# Patient Record
Sex: FEMALE | Race: OTHER | HISPANIC OR LATINO | Employment: UNEMPLOYED | URBAN - METROPOLITAN AREA
[De-identification: names, ages, dates, MRNs, and addresses within clinical notes are randomized per-mention and may not be internally consistent; named-entity substitution may affect disease eponyms.]

---

## 2017-02-13 ENCOUNTER — OFFICE VISIT (OUTPATIENT)
Dept: PODIATRY | Facility: CLINIC | Age: 47
End: 2017-02-13
Payer: COMMERCIAL

## 2017-02-13 VITALS — HEIGHT: 62 IN | WEIGHT: 110 LBS | BODY MASS INDEX: 20.24 KG/M2

## 2017-02-13 DIAGNOSIS — M79.671 PAIN IN BOTH FEET: Primary | ICD-10-CM

## 2017-02-13 DIAGNOSIS — M79.672 PAIN IN BOTH FEET: Primary | ICD-10-CM

## 2017-02-13 DIAGNOSIS — L84 CALLUS OF FOOT: ICD-10-CM

## 2017-02-13 PROCEDURE — 99999 PR PBB SHADOW E&M-EST. PATIENT-LVL II: CPT | Mod: PBBFAC,,, | Performed by: PODIATRIST

## 2017-02-13 PROCEDURE — 99213 OFFICE O/P EST LOW 20 MIN: CPT | Mod: S$GLB,,, | Performed by: PODIATRIST

## 2017-02-13 RX ORDER — UREA 1 ML/10ML
1 LOTION TOPICAL DAILY
Qty: 180 ML | Refills: 6 | Status: SHIPPED | OUTPATIENT
Start: 2017-02-13 | End: 2018-11-30 | Stop reason: SDUPTHER

## 2017-02-13 NOTE — PROGRESS NOTES
"CC:    Painful callus      HPI:   Deja Theodore is a 46 y.o. female who presents to clinic with complaints of a painful porokeratosis, bilateral foot.   Requesting trimming.  She has been using the urea cream and pumice stone to the affected area with some relief.    She is wearing athletic shoes today.  No new issues.         PMH:  Patient Active Problem List   Diagnosis    Sciatica    Spondylosis with myelopathy, lumbar region    Degeneration of lumbar or lumbosacral intervertebral disc    Spondylolysis of lumbar region    Spondylolisthesis    Myopia of both eyes - Both Eyes    Gallbladder polyp    Headache, menstrual migraine, intractable    Bilateral carpal tunnel syndrome    URI (upper respiratory infection)       Current Outpatient Prescriptions on File Prior to Visit   Medication Sig Dispense Refill    benzonatate (TESSALON) 200 MG capsule Take 1 capsule (200 mg total) by mouth every evening. 30 capsule 0    [DISCONTINUED] urea 10 % Lotn Apply 1 application topically once daily. 180 mL 6    [DISCONTINUED] amoxicillin-clavulanate 875-125mg (AUGMENTIN) 875-125 mg per tablet Take 1 tablet by mouth every 12 (twelve) hours. 14 tablet 0     No current facility-administered medications on file prior to visit.        ALLG:  Review of patient's allergies indicates:   Allergen Reactions    No known allergies          ROS:  General ROS: negative for - chills, fatigue or fever  Cardiovascular ROS: no chest pain or dyspnea on exertion  Musculoskeletal ROS: negative for - joint pain or joint stiffness   Skin: Negative for rash, negative for nail or hair changes. Positive for  Corn/callus on the foot.         EXAM:  Vitals:    02/13/17 1020   Weight: 49.9 kg (110 lb)   Height: 5' 2" (1.575 m)        General: alert and orient x 3, well-developed, well-nourished and in no apparent distress.    Vasc: Palpable pedal pulses, feet appropriately warm to touch. Capillary refill time within normal limits.   Neuro: " Epicritic sensation intact.  No focal deficits. No Tinels.   MSK: No gross osseus deformities. Pain on palpation to hyperkeratotic lesion.   Derm:  Multiple Nucleated porokeratosis, bilateral sub 2nd met head.  Tender to palpation.  No other open lesions, macerations, or rashes noted.   No redness or swelling           Assessment / Plan:    I counseled the patient on her conditions, their implications and medical management.     Pain in both feet 2/2 Callus of foot  -     urea 10 % Lotn; Apply 1 application topically once daily. To dry skin on the feet.  Dispense: 180 mL; Refill: 6  - Cont urea cream and pumice stone.      - Continue Supportive comfortable shoes only.         Call or return to clinic prn if these symptoms worsen or fail to improve as anticipated.

## 2017-02-23 ENCOUNTER — PATIENT MESSAGE (OUTPATIENT)
Dept: OBSTETRICS AND GYNECOLOGY | Facility: CLINIC | Age: 47
End: 2017-02-23

## 2017-02-24 NOTE — TELEPHONE ENCOUNTER
Called pharmacy and they have to order med it would be in Thursday next week, spoke to patient and she states that she will call back in 2 weeks to let us know if she wants this before calling in again.

## 2017-03-07 ENCOUNTER — PATIENT MESSAGE (OUTPATIENT)
Dept: OBSTETRICS AND GYNECOLOGY | Facility: CLINIC | Age: 47
End: 2017-03-07

## 2017-04-13 ENCOUNTER — PATIENT MESSAGE (OUTPATIENT)
Dept: OBSTETRICS AND GYNECOLOGY | Facility: CLINIC | Age: 47
End: 2017-04-13

## 2017-05-01 ENCOUNTER — OFFICE VISIT (OUTPATIENT)
Dept: PODIATRY | Facility: CLINIC | Age: 47
End: 2017-05-01
Payer: COMMERCIAL

## 2017-05-01 VITALS
SYSTOLIC BLOOD PRESSURE: 102 MMHG | HEIGHT: 62 IN | BODY MASS INDEX: 20.24 KG/M2 | DIASTOLIC BLOOD PRESSURE: 69 MMHG | HEART RATE: 81 BPM | WEIGHT: 110 LBS

## 2017-05-01 DIAGNOSIS — M79.671 PAIN IN BOTH FEET: Primary | ICD-10-CM

## 2017-05-01 DIAGNOSIS — L84 CALLUS OF FOOT: ICD-10-CM

## 2017-05-01 DIAGNOSIS — M79.672 PAIN IN BOTH FEET: Primary | ICD-10-CM

## 2017-05-01 PROCEDURE — 99213 OFFICE O/P EST LOW 20 MIN: CPT | Mod: S$GLB,,, | Performed by: PODIATRIST

## 2017-05-01 PROCEDURE — 1160F RVW MEDS BY RX/DR IN RCRD: CPT | Mod: S$GLB,,, | Performed by: PODIATRIST

## 2017-05-01 PROCEDURE — 99999 PR PBB SHADOW E&M-EST. PATIENT-LVL III: CPT | Mod: PBBFAC,,, | Performed by: PODIATRIST

## 2017-05-01 NOTE — PROGRESS NOTES
"CC:    Painful callus      HPI:   Deja Theodore is a 47 y.o. female who presents to clinic with complaints of a painful porokeratosis, L > R foot.   Requesting trimming.  She has been using the urea cream daily and pumice stone to the affected area with some relief.    She is wearing athletic shoes today.  No new issues. She states she never walks barefoot.       PMH:  Patient Active Problem List   Diagnosis    Sciatica    Spondylosis with myelopathy, lumbar region    Degeneration of lumbar or lumbosacral intervertebral disc    Spondylolysis of lumbar region    Spondylolisthesis    Myopia of both eyes - Both Eyes    Gallbladder polyp    Headache, menstrual migraine, intractable    Bilateral carpal tunnel syndrome    URI (upper respiratory infection)         Current Outpatient Prescriptions on File Prior to Visit   Medication Sig Dispense Refill    benzonatate (TESSALON) 200 MG capsule Take 1 capsule (200 mg total) by mouth every evening. 30 capsule 0    flibanserin 100 mg Tab Take 100 mg by mouth once daily. BIN: [id number] PCN: [id number] GROUP: [id number] ID: [id number] 30 tablet 5    norgestrel-ethinyl estradiol (LO/OVRAL) 0.3-30 mg-mcg per tablet Take 1 tablet by mouth once daily. 30 tablet 0    urea 10 % Lotn Apply 1 application topically once daily. To dry skin on the feet. 180 mL 6     No current facility-administered medications on file prior to visit.          ALLG:  Review of patient's allergies indicates:   Allergen Reactions    No known allergies          ROS:  General ROS: negative for - chills, fatigue or fever  Cardiovascular ROS: no chest pain or dyspnea on exertion  Musculoskeletal ROS: negative for - joint pain or joint stiffness   Skin: Negative for rash, negative for nail or hair changes. Positive for  Corn/callus on the foot.         EXAM:  Vitals:    05/01/17 0824   BP: 102/69   Pulse: 81   Weight: 49.9 kg (110 lb)   Height: 5' 2" (1.575 m)        General: alert and orient x 3, " well-developed, well-nourished and in no apparent distress.    Vasc: Palpable pedal pulses, feet appropriately warm to touch. Capillary refill time within normal limits.   Neuro: Epicritic sensation intact.  No focal deficits. No Tinels.   MSK: No gross osseus deformities. Pain on palpation to hyperkeratotic lesion.   Derm:  Multiple Nucleated porokeratosis, bilateral sub 2nd met head.  Tender to palpation.  No other open lesions, macerations, or rashes noted.   No redness or swelling           Assessment / Plan:    I counseled the patient on her conditions, their implications and medical management.     Pain in both feet 2/2 Callus of foot  -     Continue urea 10 % Lotn; Apply 1 application topically once daily. To dry skin on the feet.  Dispense: 180 mL; Refill: 6  - Cont urea lotion and pumice stone.      - Continue Supportive comfortable shoes only.     Call or return to clinic prn if these symptoms worsen or fail to improve as anticipated.

## 2017-05-31 ENCOUNTER — TELEPHONE (OUTPATIENT)
Dept: OBSTETRICS AND GYNECOLOGY | Facility: CLINIC | Age: 47
End: 2017-05-31

## 2017-05-31 DIAGNOSIS — Z12.31 VISIT FOR SCREENING MAMMOGRAM: Primary | ICD-10-CM

## 2017-05-31 NOTE — TELEPHONE ENCOUNTER
----- Message from Naye Pisano sent at 5/31/2017 10:55 AM CDT -----  Contact: pt   X_  1st Request  _  2nd Request  _  3rd Request        Who: RIRI BROWN [[id number]]    Why: pt is requesting [facility] orders     What Number to Call Back: [phone]    When to Expect a call back: (Before the end of the day)   -- if the call is after 12:00, the call back will be tomorrow.

## 2017-06-07 ENCOUNTER — OFFICE VISIT (OUTPATIENT)
Dept: OPTOMETRY | Facility: CLINIC | Age: 47
End: 2017-06-07
Payer: COMMERCIAL

## 2017-06-07 DIAGNOSIS — H57.11 EYE PAIN, RIGHT: Primary | ICD-10-CM

## 2017-06-07 PROCEDURE — 92014 COMPRE OPH EXAM EST PT 1/>: CPT | Mod: S$GLB,,, | Performed by: OPTOMETRIST

## 2017-06-07 PROCEDURE — 99999 PR PBB SHADOW E&M-EST. PATIENT-LVL II: CPT | Mod: PBBFAC,,, | Performed by: OPTOMETRIST

## 2017-06-07 RX ORDER — MUPIROCIN 20 MG/G
OINTMENT TOPICAL
COMMUNITY
Start: 2017-04-06 | End: 2017-09-15

## 2017-06-07 RX ORDER — SULFAMETHOXAZOLE AND TRIMETHOPRIM 800; 160 MG/1; MG/1
TABLET ORAL
COMMUNITY
Start: 2017-04-06 | End: 2017-06-08

## 2017-06-07 RX ORDER — PREDNISOLONE ACETATE 10 MG/ML
1 SUSPENSION/ DROPS OPHTHALMIC 4 TIMES DAILY
Qty: 5 ML | Refills: 0 | Status: SHIPPED | OUTPATIENT
Start: 2017-06-07 | End: 2017-06-12
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
## 2017-06-08 ENCOUNTER — PATIENT MESSAGE (OUTPATIENT)
Dept: OBSTETRICS AND GYNECOLOGY | Facility: CLINIC | Age: 47
End: 2017-06-08

## 2017-06-08 ENCOUNTER — OFFICE VISIT (OUTPATIENT)
Dept: INTERNAL MEDICINE | Facility: CLINIC | Age: 47
End: 2017-06-08
Payer: COMMERCIAL

## 2017-06-08 VITALS
BODY MASS INDEX: 21.22 KG/M2 | SYSTOLIC BLOOD PRESSURE: 108 MMHG | HEART RATE: 78 BPM | WEIGHT: 115.31 LBS | HEIGHT: 62 IN | DIASTOLIC BLOOD PRESSURE: 70 MMHG | OXYGEN SATURATION: 99 %

## 2017-06-08 DIAGNOSIS — Z00.00 ENCOUNTER FOR PREVENTIVE HEALTH EXAMINATION: Primary | ICD-10-CM

## 2017-06-08 DIAGNOSIS — Z78.9 HISTORY OF FOREIGN TRAVEL: ICD-10-CM

## 2017-06-08 PROCEDURE — 90715 TDAP VACCINE 7 YRS/> IM: CPT | Mod: S$GLB,,, | Performed by: PHYSICIAN ASSISTANT

## 2017-06-08 PROCEDURE — 90471 IMMUNIZATION ADMIN: CPT | Mod: S$GLB,,, | Performed by: PHYSICIAN ASSISTANT

## 2017-06-08 PROCEDURE — 99396 PREV VISIT EST AGE 40-64: CPT | Mod: S$GLB,,, | Performed by: PHYSICIAN ASSISTANT

## 2017-06-08 PROCEDURE — 99999 PR PBB SHADOW E&M-EST. PATIENT-LVL IV: CPT | Mod: PBBFAC,,, | Performed by: PHYSICIAN ASSISTANT

## 2017-06-08 NOTE — PROGRESS NOTES
Subjective:       Patient ID: Deja Theodore is a 47 y.o. female.        Chief Complaint: Annual Exam    Deja Theodore is an established patient of Fransisco Sepulveda MD here today for annual exam.      Feeling well.  Busy with her 2 children.  Planning on going to [place] later this year.      HEALTH MAINTENANCE  Pap and mammo are scheduled  Labs-due, schedule  Tdap-due, completed today 6/8/17  Eye exam-due, she will self schedule           Review of Systems   Constitutional: Negative for activity change, chills, diaphoresis, fatigue, fever and unexpected weight change.   HENT: Negative for congestion, hearing loss, rhinorrhea, sore throat and trouble swallowing.    Eyes: Negative for discharge and visual disturbance.   Respiratory: Negative for cough, chest tightness, shortness of breath and wheezing.    Cardiovascular: Negative for chest pain, palpitations and leg swelling.   Gastrointestinal: Negative for abdominal pain, blood in stool, constipation, diarrhea, nausea and vomiting.   Endocrine: Negative for polydipsia.   Genitourinary: Negative for difficulty urinating, dysuria, frequency, hematuria, menstrual problem and urgency.   Musculoskeletal: Negative for arthralgias, back pain, joint swelling and neck pain.   Skin: Negative for rash.   Neurological: Positive for headaches (migraines, intermittent). Negative for dizziness, syncope and weakness.   Psychiatric/Behavioral: Negative for confusion, dysphoric mood and sleep disturbance. The patient is not nervous/anxious.        Objective:      Physical Exam   Constitutional: She appears well-developed and well-nourished. No distress.   HENT:   Head: Normocephalic and atraumatic.   Right Ear: Tympanic membrane and external ear normal.   Left Ear: Tympanic membrane and external ear normal.   Nose: Nose normal.   Mouth/Throat: Oropharynx is clear and moist.   Eyes: Conjunctivae are normal. Pupils are equal, round, and reactive to light.   Cardiovascular:  "Normal rate, regular rhythm and normal heart sounds.  Exam reveals no gallop.    No murmur heard.  Pulmonary/Chest: Effort normal and breath sounds normal. No respiratory distress.   Abdominal: Soft. Normal appearance. There is no tenderness. There is no rebound, no guarding and no CVA tenderness.   Musculoskeletal: She exhibits no edema.   Neurological: She is alert.   Skin: Skin is warm and dry. She is not diaphoretic.   Psychiatric: She has a normal mood and affect.   Nursing note and vitals reviewed.      Assessment:       1. Encounter for preventive health examination    2. History of foreign travel        Plan:       Deja was seen today for annual exam.    Diagnoses and all orders for this visit:    Encounter for preventive health examination  -     CBC auto differential; Future  -     Comprehensive metabolic panel; Future  -     Hemoglobin A1c; Future  -     Lipid panel; Future  -     TSH; Future  -     Tdap Vaccine    History of foreign travel  -     Ambulatory Referral to Travel Clinic    Schedule lab work.  Tdap given.  Mammo and GYN exam already scheduled.    Pt has been given instructions populated from [employer] database and has verbalized understanding of the after visit summary and information contained wherein.    Follow up with a primary care provider. May go to ER for acute shortness of breath, lightheadedness, fever, or any other emergent complaints or changes in condition.    "This note will be shared with the patient"    Future Appointments  Date Time Provider Department Center   6/9/2017 9:30 AM LAB, SAME DAY [facility] INTMED [facility] LAB IM Luis Antonio Crawley PCW   7/3/2017 10:00 AM Hoang Palacio OD [facility] OPTOMTY [place]   7/7/2017 1:00 PM Peggy Carrero MD [facility] OBGYN [place]   7/19/2017 10:00 AM [facility] MAMMO2 SCREEN [facility] MAMMO Luis Antonio Crawley               "

## 2017-06-08 NOTE — PATIENT INSTRUCTIONS
Prevention Guidelines, Women Ages 40 to 49  Screening tests and vaccines are an important part of managing your health. Health counseling is essential, too. Below are guidelines for these, for women ages 40 to 49. Talk with your healthcare provider to make sure youre up-to-date on what you need.  Screening Who needs it How often   Type 2 diabetes or prediabetes All adults beginning at age 45 and adults without symptoms at any age who are overweight or obese and have 1 or more additional risk factors for diabetes At least every 3 years   Alcohol misuse All women in this age group At routine exams   Blood pressure All women in this age group Every 2 years if your blood pressure is less than 120/80 mm Hg; yearly if your systolic blood pressure is 120 to 139 mm Hg, or your diastolic blood pressure reading is 80 to 89 mm Hg   Breast cancer All women in this age group Yearly mammogram and clinical breast exam2  Each woman should make her own decision. If a woman decides to have mammograms before age 50, they should be done every 2 years.3   Cervical cancer All women in this age group, except women who have had a complete hysterectomy Pap test every 3 years or Pap test plus human papilloma virus (HPV) test every 5 years   Chlamydia Women at increased risk for infection At routine exams if you're at risk or have symptoms   Depression All women in this age group At routine exams   Gonorrhea Sexually active women at increased risk for infection At routine exams   Hepatitis C Anyone at increased risk; 1 time for those born between 1945 and 1965 At routine exams   High cholesterol or triglycerides All women ages 45 and older who are at risk for coronary artery disease; younger women, talk with your healthcare provider At least every 5 years   HIV All women At routine exams   Obesity All women in this age group At routine exams   Syphilis Women at increased risk for infection - talk with your healthcare provider At routine  exams   Tuberculosis Women at increased risk for infection - talk with your healthcare provider Ask your healthcare provider   Vision All women in this age group Complete exam at age 40 and eye exams every 2 to 4 years. If you have a chronic disease, ask your healthcare provider how often you should have your eyes examined.4   Vaccine Who needs it How often   Chickenpox (varicella) All women in this age group who have no record of this infection or vaccine 2 doses; the second dose should be given at least 4 weeks after the first dose   Hepatitis A Women at increased risk for infection - talk with your healthcare provider 2 doses given 6 months apart   Hepatitis B Women at increased risk for infection - talk with your healthcare provider 3 doses over 6 months; second dose should be given 1 month after the first dose; the third dose should be given at least 2 months after the second dose and at least 4 months after the first dose   Haemophilus influenzae Type B (HIB) Women at increased risk 1 to 3 doses   Influenza (flu) All women in this age group Once a year   Measles, mumps, rubella (MMR) All women in this age group who have no record of these infections or vaccines 1 or 2 doses   Meningococcal Women at increased risk for infection - talk with your healthcare provider 1 or more doses   Pneumococcal conjugate vaccine (PCV13) and pneumococcal polysaccharide vaccine (PPSV23) Women at increased risk for infection - talk with your healthcare provider 1 or 2 doses   Tetanus/diphtheria/pertussis (Td/Tdap) booster All women in this age group A one-time dose of Tdap instead of a Td booster after age 18, then Td every 10 years   Counseling Who needs it How often   BRCA gene mutation testing for breast and ovarian cancer susceptibility Women with increased risk for having gene mutation When your risk is known   Breast cancer and chemoprevention Women at high risk for breast cancer When your risk is known   Diet and exercise  Women who are overweight or obese When diagnosed, and then at routine exams   Domestic violence Women at the age in which they are able to have children At routine exams   Sexually transmitted infection prevention Women at increased risk for infection - talk with your healthcare provider At routine exams   Use of tobacco and the health effects it can cause All women in this age group Every exam   1[facility] Diabetes Association  2American Cancer Society  3U.S. Preventive Services Task Force  4[facility] Academy of Ophthalmology  Date Last Reviewed: 8/27/2015  © 8582-4137 [employer]. [address] [place] [address], [place] [address]. All rights reserved. This information is not intended as a substitute for professional medical care. Always follow your healthcare professional's instructions.

## 2017-06-09 ENCOUNTER — LAB VISIT (OUTPATIENT)
Dept: LAB | Facility: HOSPITAL | Age: 47
End: 2017-06-09
Attending: INTERNAL MEDICINE
Payer: COMMERCIAL

## 2017-06-09 ENCOUNTER — PATIENT MESSAGE (OUTPATIENT)
Dept: OBSTETRICS AND GYNECOLOGY | Facility: CLINIC | Age: 47
End: 2017-06-09

## 2017-06-09 DIAGNOSIS — Z00.00 ENCOUNTER FOR PREVENTIVE HEALTH EXAMINATION: ICD-10-CM

## 2017-06-09 LAB
ALBUMIN SERPL BCP-MCNC: 3.9 G/DL
ALP SERPL-CCNC: 57 U/L
ALT SERPL W/O P-5'-P-CCNC: 14 U/L
ANION GAP SERPL CALC-SCNC: 6 MMOL/L
AST SERPL-CCNC: 18 U/L
BASOPHILS # BLD AUTO: 0.02 K/UL
BASOPHILS NFR BLD: 0.4 %
BILIRUB SERPL-MCNC: 0.7 MG/DL
BUN SERPL-MCNC: 15 MG/DL
CALCIUM SERPL-MCNC: 9.1 MG/DL
CHLORIDE SERPL-SCNC: 107 MMOL/L
CHOLEST/HDLC SERPL: 2.6 {RATIO}
CO2 SERPL-SCNC: 24 MMOL/L
CREAT SERPL-MCNC: 0.8 MG/DL
DIFFERENTIAL METHOD: ABNORMAL
EOSINOPHIL # BLD AUTO: 0.2 K/UL
EOSINOPHIL NFR BLD: 3.8 %
ERYTHROCYTE [DISTWIDTH] IN BLOOD BY AUTOMATED COUNT: 12.4 %
EST. GFR  (AFRICAN AMERICAN): >60 ML/MIN/1.73 M^2
EST. GFR  (NON AFRICAN AMERICAN): >60 ML/MIN/1.73 M^2
ESTIMATED AVG GLUCOSE: 100 MG/DL
GLUCOSE SERPL-MCNC: 88 MG/DL
HBA1C MFR BLD HPLC: 5.1 %
HCT VFR BLD AUTO: 36.4 %
HDL/CHOLESTEROL RATIO: 38.6 %
HDLC SERPL-MCNC: 140 MG/DL
HDLC SERPL-MCNC: 54 MG/DL
HGB BLD-MCNC: 12.6 G/DL
LDLC SERPL CALC-MCNC: 76.6 MG/DL
LYMPHOCYTES # BLD AUTO: 1.6 K/UL
LYMPHOCYTES NFR BLD: 30.8 %
MCH RBC QN AUTO: 29.5 PG
MCHC RBC AUTO-ENTMCNC: 34.6 %
MCV RBC AUTO: 85 FL
MONOCYTES # BLD AUTO: 0.5 K/UL
MONOCYTES NFR BLD: 8.6 %
NEUTROPHILS # BLD AUTO: 3 K/UL
NEUTROPHILS NFR BLD: 56.2 %
NONHDLC SERPL-MCNC: 86 MG/DL
PLATELET # BLD AUTO: 154 K/UL
PMV BLD AUTO: 12.7 FL
POTASSIUM SERPL-SCNC: 4.3 MMOL/L
PROT SERPL-MCNC: 7.1 G/DL
RBC # BLD AUTO: 4.27 M/UL
SODIUM SERPL-SCNC: 137 MMOL/L
TRIGL SERPL-MCNC: 47 MG/DL
TSH SERPL DL<=0.005 MIU/L-ACNC: 1.46 UIU/ML
WBC # BLD AUTO: 5.26 K/UL

## 2017-06-09 PROCEDURE — 80053 COMPREHEN METABOLIC PANEL: CPT

## 2017-06-09 PROCEDURE — 36415 COLL VENOUS BLD VENIPUNCTURE: CPT

## 2017-06-09 PROCEDURE — 85025 COMPLETE CBC W/AUTO DIFF WBC: CPT

## 2017-06-09 PROCEDURE — 83036 HEMOGLOBIN GLYCOSYLATED A1C: CPT

## 2017-06-09 PROCEDURE — 84443 ASSAY THYROID STIM HORMONE: CPT

## 2017-06-09 PROCEDURE — 80061 LIPID PANEL: CPT

## 2017-06-12 ENCOUNTER — TELEPHONE (OUTPATIENT)
Dept: OPTOMETRY | Facility: CLINIC | Age: 47
End: 2017-06-12

## 2017-06-12 NOTE — TELEPHONE ENCOUNTER
Called pt 11:30 AM.  Her eye is doing fine, so she stopped the drops and went back to CL wear with no problems.  She will keep her routine exam appt with Dr Palacio later this month

## 2017-06-15 ENCOUNTER — PATIENT MESSAGE (OUTPATIENT)
Dept: OBSTETRICS AND GYNECOLOGY | Facility: CLINIC | Age: 47
End: 2017-06-15

## 2017-07-03 ENCOUNTER — OFFICE VISIT (OUTPATIENT)
Dept: OPTOMETRY | Facility: CLINIC | Age: 47
End: 2017-07-03
Payer: COMMERCIAL

## 2017-07-03 DIAGNOSIS — H52.4 MYOPIA WITH ASTIGMATISM AND PRESBYOPIA, BILATERAL: Primary | ICD-10-CM

## 2017-07-03 DIAGNOSIS — H52.203 MYOPIA WITH ASTIGMATISM AND PRESBYOPIA, BILATERAL: Primary | ICD-10-CM

## 2017-07-03 DIAGNOSIS — H52.13 MYOPIA WITH ASTIGMATISM AND PRESBYOPIA, BILATERAL: Primary | ICD-10-CM

## 2017-07-03 PROCEDURE — 92012 INTRM OPH EXAM EST PATIENT: CPT | Mod: S$GLB,,, | Performed by: OPTOMETRIST

## 2017-07-03 PROCEDURE — 92015 DETERMINE REFRACTIVE STATE: CPT | Mod: S$GLB,,, | Performed by: OPTOMETRIST

## 2017-07-03 PROCEDURE — 99999 PR PBB SHADOW E&M-EST. PATIENT-LVL II: CPT | Mod: PBBFAC,,, | Performed by: OPTOMETRIST

## 2017-07-03 NOTE — PROGRESS NOTES
WADE ALVAREZ 06/2016. Wears contacts vision and comfort good. Glasses still seem   fine, would like RX for bifocals. Was seen 06/07/2017 for eye injury, eye   seems back to normal now discontinued drops.    Last edited by Yennifer Posey on 7/3/2017 10:04 AM. (History)        ROS     Negative for: Constitutional, Gastrointestinal, Neurological, Skin,   Genitourinary, Musculoskeletal, HENT, Endocrine, Cardiovascular, Eyes,   Respiratory, Psychiatric, Allergic/Imm, Heme/Lymph    Last edited by Hoang Palacio, OD on 7/3/2017 10:34 AM. (History)        Assessment /Plan     For exam results, see Encounter Report.    Myopia with astigmatism and presbyopia, bilateral      1.Spec Rx given. Doing fine with contact lenses. Different lens options discussed with patient. RTC 1 year full exam.

## 2017-07-19 ENCOUNTER — PATIENT MESSAGE (OUTPATIENT)
Dept: INTERNAL MEDICINE | Facility: CLINIC | Age: 47
End: 2017-07-19

## 2017-07-19 DIAGNOSIS — M79.672 LEFT FOOT PAIN: Primary | ICD-10-CM

## 2017-07-24 ENCOUNTER — PATIENT MESSAGE (OUTPATIENT)
Dept: PODIATRY | Facility: CLINIC | Age: 47
End: 2017-07-24

## 2017-07-24 NOTE — TELEPHONE ENCOUNTER
Called  And spoke with pt made appt  For 8/1/2017 @ 11:30am   In [place] at [facility] per pt thank you

## 2017-07-31 ENCOUNTER — PATIENT MESSAGE (OUTPATIENT)
Dept: PODIATRY | Facility: CLINIC | Age: 47
End: 2017-07-31

## 2017-08-08 ENCOUNTER — OFFICE VISIT (OUTPATIENT)
Dept: PODIATRY | Facility: CLINIC | Age: 47
End: 2017-08-08
Payer: COMMERCIAL

## 2017-08-08 VITALS
SYSTOLIC BLOOD PRESSURE: 108 MMHG | BODY MASS INDEX: 21.16 KG/M2 | DIASTOLIC BLOOD PRESSURE: 70 MMHG | HEIGHT: 62 IN | HEART RATE: 72 BPM | WEIGHT: 115 LBS

## 2017-08-08 DIAGNOSIS — M21.41 PES PLANUS OF BOTH FEET: Primary | ICD-10-CM

## 2017-08-08 DIAGNOSIS — M21.42 PES PLANUS OF BOTH FEET: Primary | ICD-10-CM

## 2017-08-08 DIAGNOSIS — M20.10 HALLUX ABDUCTO VALGUS, UNSPECIFIED LATERALITY: ICD-10-CM

## 2017-08-08 PROCEDURE — 99999 PR PBB SHADOW E&M-EST. PATIENT-LVL III: CPT | Mod: PBBFAC,,, | Performed by: PODIATRIST

## 2017-08-08 PROCEDURE — 3008F BODY MASS INDEX DOCD: CPT | Mod: S$GLB,,, | Performed by: PODIATRIST

## 2017-08-08 PROCEDURE — 99214 OFFICE O/P EST MOD 30 MIN: CPT | Mod: S$GLB,,, | Performed by: PODIATRIST

## 2017-08-10 NOTE — PROGRESS NOTES
"CC:    left foot pain      HPI:   Deja Theodore is a 47 y.o. female who presents to clinic with complaints of left foot pain, localized to arch area.  She has history of painful calluses sub metatarsal heads bilateral as well.    She went to see her chiropractor who noted that she has flat feet.  She is usually in athletic shoes.  In casual sandals today; came in with her two young sons.  She has not tried insoles yet.       PMH:  Patient Active Problem List   Diagnosis    Sciatica    Spondylosis with myelopathy, lumbar region    Degeneration of lumbar or lumbosacral intervertebral disc    Spondylolysis of lumbar region    Spondylolisthesis    Myopia of both eyes - Both Eyes    Gallbladder polyp    Headache, menstrual migraine, intractable    Bilateral carpal tunnel syndrome    URI (upper respiratory infection)         Current Outpatient Prescriptions on File Prior to Visit   Medication Sig Dispense Refill    mupirocin (BACTROBAN) 2 % ointment       urea 10 % Lotn Apply 1 application topically once daily. To dry skin on the feet. 180 mL 6     No current facility-administered medications on file prior to visit.          ALLG:  Review of patient's allergies indicates:   Allergen Reactions    No known allergies          ROS:  General ROS: negative for - chills, fatigue or fever  Cardiovascular ROS: no chest pain or dyspnea on exertion  Musculoskeletal ROS: negative for - joint pain or joint stiffness   Skin: Negative for rash, negative for nail or hair changes. Positive for  Corn/callus on the foot.         EXAM:  Vitals:    08/08/17 1132   BP: 108/70   Pulse: 72   Weight: 52.2 kg (115 lb)   Height: 5' 2" (1.575 m)        General: alert and orient x 3, well-developed, well-nourished and in no apparent distress.    Vasc: Palpable pedal pulses, feet appropriately warm to touch. Capillary refill time within normal limits.   Neuro: Epicritic sensation intact.  No focal deficits. No Tinels.   MSK: bilateral " mild bunion deformity, minimal pes planus, arch still maintained weight bearing. Minimal equinus bilateral   Derm:  Multiple Nucleated porokeratosis, bilateral sub 2nd met head.  Tender to palpation.  No other open lesions, macerations, or rashes noted.   No redness or swelling           Assessment / Plan:    I counseled the patient on her conditions, their implications and medical management.     Pes planus of both feet    Hallux abducto valgus, unspecified laterality    - custom foot orthotics or consider Spenco orthotic Arch (OTC)  - stretching exercises  - never walk barefoot    Call or return to clinic prn if these symptoms worsen or fail to improve as anticipated.

## 2017-08-21 ENCOUNTER — PATIENT MESSAGE (OUTPATIENT)
Dept: PODIATRY | Facility: CLINIC | Age: 47
End: 2017-08-21

## 2017-08-21 ENCOUNTER — PATIENT MESSAGE (OUTPATIENT)
Dept: OBSTETRICS AND GYNECOLOGY | Facility: CLINIC | Age: 47
End: 2017-08-21

## 2017-09-15 ENCOUNTER — OFFICE VISIT (OUTPATIENT)
Dept: OBSTETRICS AND GYNECOLOGY | Facility: CLINIC | Age: 47
End: 2017-09-15
Payer: COMMERCIAL

## 2017-09-15 ENCOUNTER — HOSPITAL ENCOUNTER (OUTPATIENT)
Dept: RADIOLOGY | Facility: HOSPITAL | Age: 47
Discharge: HOME | End: 2017-09-15
Attending: OBSTETRICS & GYNECOLOGY
Payer: COMMERCIAL

## 2017-09-15 VITALS
BODY MASS INDEX: 20.69 KG/M2 | WEIGHT: 112.44 LBS | SYSTOLIC BLOOD PRESSURE: 100 MMHG | HEIGHT: 62 IN | DIASTOLIC BLOOD PRESSURE: 68 MMHG

## 2017-09-15 DIAGNOSIS — Z01.419 WELL FEMALE EXAM WITH ROUTINE GYNECOLOGICAL EXAM: Primary | ICD-10-CM

## 2017-09-15 DIAGNOSIS — Z12.31 VISIT FOR SCREENING MAMMOGRAM: ICD-10-CM

## 2017-09-15 PROCEDURE — 99999 PR PBB SHADOW E&M-EST. PATIENT-LVL III: CPT | Mod: PBBFAC,,, | Performed by: OBSTETRICS & GYNECOLOGY

## 2017-09-15 PROCEDURE — 99396 PREV VISIT EST AGE 40-64: CPT | Mod: S$GLB,,, | Performed by: OBSTETRICS & GYNECOLOGY

## 2017-09-15 PROCEDURE — 77067 SCR MAMMO BI INCL CAD: CPT | Mod: TC

## 2017-09-15 PROCEDURE — 77067 SCR MAMMO BI INCL CAD: CPT | Mod: 26,,, | Performed by: RADIOLOGY

## 2017-09-15 RX ORDER — TERCONAZOLE 8 MG/G
1 CREAM VAGINAL NIGHTLY
Qty: 20 G | Refills: 0 | Status: SHIPPED | OUTPATIENT
Start: 2017-09-15 | End: 2017-09-22

## 2017-09-15 NOTE — PROGRESS NOTES
SUBJECTIVE:   47 y.o. female   for routine gyn exam. Patient's last menstrual period was 08/15/2017 (approximate)..  She has  Been exercising and sometimes has a vulvar rash that itches.  Also has some hot flashes.  Still with monthly periods.         Past Medical History:   Diagnosis Date    Anemia     Arthritis     Carpal tunnel syndrome     Disorders of eyelid NEC     MGD    Keratitis secondary to contact lens     OD    Spondylolisthesis      Past Surgical History:   Procedure Laterality Date    ADENOIDECTOMY      NOSE SURGERY      Age 18-cosmetic    TONSILLECTOMY       Social History     Social History    Marital status:      Spouse name: N/A    Number of children: 2    Years of education: N/A     Occupational History     Not Employed     Social History Main Topics    Smoking status: Former Smoker     Packs/day: 0.30     Years: 10.00     Types: Cigarettes     Quit date: 2005    Smokeless tobacco: Never Used    Alcohol use No      Comment: Rare    Drug use: No    Sexual activity: Yes     Partners: Male     Birth control/ protection: None     Other Topics Concern    Not on file     Social History Narrative    No narrative on file     Family History   Problem Relation Age of Onset    Depression Mother     Mental illness Mother     Hypertension Mother     Hypertension Father     Cancer Sister     Asthma Brother     Mental illness Brother     Asthma Son     Amblyopia Neg Hx     Blindness Neg Hx     Cataracts Neg Hx     Diabetes Neg Hx     Glaucoma Neg Hx     Macular degeneration Neg Hx     Retinal detachment Neg Hx     Strabismus Neg Hx     Stroke Neg Hx     Thyroid disease Neg Hx     Breast cancer Neg Hx      OB History    Para Term  AB Living   1 1 1     1   SAB TAB Ectopic Multiple Live Births           1      # Outcome Date GA Lbr Khurram/2nd Weight Sex Delivery Anes PTL Lv   1 Term                     Current Outpatient Prescriptions   Medication  "Sig Dispense Refill    urea 10 % Lotn Apply 1 application topically once daily. To dry skin on the feet. 180 mL 6    terconazole (TERAZOL 3) 0.8 % vaginal cream Place 1 applicator vaginally every evening. 20 g 0     No current facility-administered medications for this visit.      Allergies: No known allergies     ROS:  Constitutional: no weight loss, weight gain, fever, fatigue  Eyes:  No vision changes, glasses/contacts  ENT/Mouth: No ulcers, sinus problems, ears ringing, headache  Cardiovascular: No inability to lie flat, chest pain, exercise intolerance, swelling, heart palpitations  Respiratory: No wheezing, coughing blood, shortness of breath, or cough  Gastrointestinal: No diarrhea, bloody stool, nausea/vomiting, constipation, gas, hemorrhoids  Genitourinary: No blood in urine, painful urination, urgency of urination, frequency of urination, incomplete emptying, incontinence, abnormal bleeding, painful periods, heavy periods, vaginal discharge, vaginal odor, painful intercourse, sexual problems, bleeding after intercourse.  Musculoskeletal: No muscle weakness  Skin/Breast: No painful breasts, nipple discharge, masses, rash, ulcers  Neurological: No passing out, seizures, numbness, headache  Endocrine: No diabetes, hypothyroid, hyperthyroid, hot flashes, hair loss, abnormal hair growth, ance  Psychiatric: No depression, crying  Hematologic: No bruises, bleeding, swollen lymph nodes, anemia.      OBJECTIVE:   The patient appears well, alert, oriented x 3, in no distress.  /68 (BP Location: Left arm, Patient Position: Sitting, BP Method: Medium (Manual))   Ht 5' 2" (1.575 m)   Wt 51 kg (112 lb 7 oz)   LMP 08/15/2017 (Approximate)   BMI 20.56 kg/m²   NECK: no thyromegaly, trachea midline  SKIN: no acne, striae, hirsutism  CHEST: CTAB  CV: RRR  BREAST EXAM: breasts appear normal, no suspicious masses, no skin or nipple changes or axillary nodes  ABDOMEN: no hernias, masses, or " hepatosplenomegaly  GENITALIA: normal external genitalia, no erythema, no discharge  URETHRA: normal urethra, normal urethral meatus  VAGINA: Normal  CERVIX: no lesions or cervical motion tenderness  UTERUS: normal size, contour, position, consistency, mobility, non-tender  ADNEXA: no mass, fullness, tenderness      ASSESSMENT:   1. Well female exam with routine gynecological exam         PLAN:   Orders Placed This Encounter    terconazole (TERAZOL 3) 0.8 % vaginal cream     Discussed perimenopause.  Avoid wet heat. Terazol if needed  Return to clinic in 1 year

## 2017-10-10 ENCOUNTER — PATIENT MESSAGE (OUTPATIENT)
Dept: INTERNAL MEDICINE | Facility: CLINIC | Age: 47
End: 2017-10-10

## 2017-10-10 ENCOUNTER — PATIENT MESSAGE (OUTPATIENT)
Dept: OBSTETRICS AND GYNECOLOGY | Facility: CLINIC | Age: 47
End: 2017-10-10

## 2017-10-10 ENCOUNTER — OFFICE VISIT (OUTPATIENT)
Dept: OBSTETRICS AND GYNECOLOGY | Facility: CLINIC | Age: 47
End: 2017-10-10
Payer: COMMERCIAL

## 2017-10-10 VITALS
DIASTOLIC BLOOD PRESSURE: 68 MMHG | BODY MASS INDEX: 20.88 KG/M2 | SYSTOLIC BLOOD PRESSURE: 100 MMHG | WEIGHT: 113.44 LBS | HEIGHT: 62 IN

## 2017-10-10 DIAGNOSIS — N76.1 SUBACUTE VAGINITIS: Primary | ICD-10-CM

## 2017-10-10 LAB
CANDIDA RRNA VAG QL PROBE: NEGATIVE
G VAGINALIS RRNA GENITAL QL PROBE: NEGATIVE
T VAGINALIS RRNA GENITAL QL PROBE: NEGATIVE

## 2017-10-10 PROCEDURE — 87660 TRICHOMONAS VAGIN DIR PROBE: CPT

## 2017-10-10 PROCEDURE — 99214 OFFICE O/P EST MOD 30 MIN: CPT | Mod: S$GLB,,, | Performed by: ADVANCED PRACTICE MIDWIFE

## 2017-10-10 PROCEDURE — 99999 PR PBB SHADOW E&M-EST. PATIENT-LVL III: CPT | Mod: PBBFAC,,, | Performed by: ADVANCED PRACTICE MIDWIFE

## 2017-10-10 PROCEDURE — 87480 CANDIDA DNA DIR PROBE: CPT

## 2017-10-10 NOTE — PROGRESS NOTES
"Deja Theodore is a 47 y.o. female  p[facility] with complaint of vaginal itching for the last month. Pt reports that it feel like the itching is external. Pt has been applying butt paste but has gotten minimal relief.    Pt sees Dr. Carrero for her OB/GYN care.    ROS:  GENERAL: No fever, chills, fatigability or weight loss.  VULVAR: No pain, no lesions, reports itching  VAGINAL: No relaxation, no abnormal bleeding and no lesions. Reports slight itching  ABDOMEN: No abdominal pain. Denies nausea. Denies vomiting. No diarrhea. No constipation  BREAST: Denies pain. No lumps. No discharge.  URINARY: No incontinence, no nocturia, no frequency and no dysuria.  CARDIOVASCULAR: No chest pain. No shortness of breath. No leg cramps.  NEUROLOGICAL: No headaches. No vision changes.      Review of patient's allergies indicates:   Allergen Reactions    No known allergies        Current Outpatient Prescriptions:     urea 10 % Lotn, Apply 1 application topically once daily. To dry skin on the feet., Disp: 180 mL, Rfl: 6    Past Medical History:   Diagnosis Date    Anemia     Arthritis     Carpal tunnel syndrome     Keratitis secondary to contact lens     OD    Other disorders of eyelid(374.89)     MGD    Spondylolisthesis      Past Surgical History:   Procedure Laterality Date    ADENOIDECTOMY      NOSE SURGERY      Age 18-cosmetic    TONSILLECTOMY       Social History   Substance Use Topics    Smoking status: Former Smoker     Packs/day: 0.30     Years: 10.00     Types: Cigarettes     Quit date: 2005    Smokeless tobacco: Never Used    Alcohol use No      Comment: Rare     OB History    Para Term  AB Living   1 1 1     1   SAB TAB Ectopic Multiple Live Births           1      # Outcome Date GA Lbr Khurram/2nd Weight Sex Delivery Anes PTL Lv   1 Term                   /68   Ht 5' 2" (1.575 m)   Wt 51.5 kg (113 lb 6.8 oz)   LMP 2017 (Approximate)   BMI " 20.75 kg/m²     PHYSICAL EXAM:  GENERAL: Calm and appropriate affect, alert, oriented x4  SKIN: Color appropriate for race, warm and dry, clean and intact with no rashes.  RESP: Even, unlabored breathing  ABDOMEN: Soft, nontender, no masses.  :   Normal external female genitalia without lesions. Normal hair distribution. Adequate perineal body, normal urethral meatus.  Vagina pink and well rugated, no lesions, vaginal discharge - minimal discharge, slight bleeding  No significant cystocele or rectocele.  Cervix pink without discharge or lesions, no cervical motion tenderness.  Uterus 4-6 weeks size, regular, mobile and nontender.  Adnexa: normal adnexa in size, nontender and no masses      ASSESSMENT / PLAN:    ICD-10-CM ICD-9-CM    1. Subacute vaginitis N76.1 616.10 Vaginosis Screen by DNA Probe     Subacute vaginitis  -     Vaginosis Screen by DNA Probe          Patient was counseled today on vaginitis prevention including :  a. avoiding feminine products such as deoderant soaps, body wash, bubble bath, douches, scented toilet paper, deoderant tampons or pads, feminine wipes, chronic pad use, etc.  b. avoiding other vulvovaginal irritants such as long hot baths, humidity, tight, synthetic clothing, chlorine and sitting around in wet bathing suits  c. wearing cotton underwear, avoiding thong underwear and no underwear to bed  d. taking showers instead of baths and use a hair dryer on cool setting afterwards to dry  e. wearing cotton to exercise and shower immediately after exercise and change clothes  f. using polyurethane condoms without spermicide if sexually active and symptoms are triggered by intercourse  g. Discussed use of vagisil, along with repHresh and probiotics    FOLLOW UP:   Pending lab results, PRN lack of improvement.

## 2017-10-11 ENCOUNTER — PATIENT MESSAGE (OUTPATIENT)
Dept: OBSTETRICS AND GYNECOLOGY | Facility: CLINIC | Age: 47
End: 2017-10-11

## 2017-11-02 ENCOUNTER — TELEPHONE (OUTPATIENT)
Dept: OPTOMETRY | Facility: CLINIC | Age: 47
End: 2017-11-02

## 2017-11-12 ENCOUNTER — PATIENT MESSAGE (OUTPATIENT)
Dept: INTERNAL MEDICINE | Facility: CLINIC | Age: 47
End: 2017-11-12

## 2017-11-13 ENCOUNTER — PATIENT MESSAGE (OUTPATIENT)
Dept: INTERNAL MEDICINE | Facility: CLINIC | Age: 47
End: 2017-11-13

## 2017-11-15 ENCOUNTER — LAB VISIT (OUTPATIENT)
Dept: LAB | Facility: HOSPITAL | Age: 47
End: 2017-11-15
Payer: COMMERCIAL

## 2017-11-15 ENCOUNTER — OFFICE VISIT (OUTPATIENT)
Dept: INTERNAL MEDICINE | Facility: CLINIC | Age: 47
End: 2017-11-15
Payer: COMMERCIAL

## 2017-11-15 VITALS
SYSTOLIC BLOOD PRESSURE: 100 MMHG | WEIGHT: 114.63 LBS | HEART RATE: 66 BPM | HEIGHT: 62 IN | DIASTOLIC BLOOD PRESSURE: 68 MMHG | BODY MASS INDEX: 21.1 KG/M2 | OXYGEN SATURATION: 98 %

## 2017-11-15 DIAGNOSIS — R10.10 UPPER ABDOMINAL PAIN: Primary | ICD-10-CM

## 2017-11-15 DIAGNOSIS — R10.10 UPPER ABDOMINAL PAIN: ICD-10-CM

## 2017-11-15 LAB
ALBUMIN SERPL BCP-MCNC: 3.8 G/DL
ALP SERPL-CCNC: 60 U/L
ALT SERPL W/O P-5'-P-CCNC: 20 U/L
ANION GAP SERPL CALC-SCNC: 9 MMOL/L
AST SERPL-CCNC: 18 U/L
BASOPHILS # BLD AUTO: 0.03 K/UL
BASOPHILS NFR BLD: 0.5 %
BILIRUB SERPL-MCNC: 0.5 MG/DL
BUN SERPL-MCNC: 11 MG/DL
CALCIUM SERPL-MCNC: 10 MG/DL
CHLORIDE SERPL-SCNC: 106 MMOL/L
CO2 SERPL-SCNC: 27 MMOL/L
CREAT SERPL-MCNC: 0.8 MG/DL
DIFFERENTIAL METHOD: ABNORMAL
EOSINOPHIL # BLD AUTO: 0.2 K/UL
EOSINOPHIL NFR BLD: 3.3 %
ERYTHROCYTE [DISTWIDTH] IN BLOOD BY AUTOMATED COUNT: 12.3 %
EST. GFR  (AFRICAN AMERICAN): >60 ML/MIN/1.73 M^2
EST. GFR  (NON AFRICAN AMERICAN): >60 ML/MIN/1.73 M^2
GLUCOSE SERPL-MCNC: 92 MG/DL
HCT VFR BLD AUTO: 38.2 %
HGB BLD-MCNC: 12.6 G/DL
LIPASE SERPL-CCNC: 17 U/L
LYMPHOCYTES # BLD AUTO: 1.9 K/UL
LYMPHOCYTES NFR BLD: 31.5 %
MCH RBC QN AUTO: 29.2 PG
MCHC RBC AUTO-ENTMCNC: 33 G/DL
MCV RBC AUTO: 88 FL
MONOCYTES # BLD AUTO: 0.5 K/UL
MONOCYTES NFR BLD: 8 %
NEUTROPHILS # BLD AUTO: 3.4 K/UL
NEUTROPHILS NFR BLD: 56.4 %
NRBC BLD-RTO: 0 /100 WBC
PLATELET # BLD AUTO: 193 K/UL
PMV BLD AUTO: 13.5 FL
POTASSIUM SERPL-SCNC: 4.4 MMOL/L
PROT SERPL-MCNC: 7.2 G/DL
RBC # BLD AUTO: 4.32 M/UL
SODIUM SERPL-SCNC: 142 MMOL/L
WBC # BLD AUTO: 6.03 K/UL

## 2017-11-15 PROCEDURE — 36415 COLL VENOUS BLD VENIPUNCTURE: CPT

## 2017-11-15 PROCEDURE — 99999 PR PBB SHADOW E&M-EST. PATIENT-LVL IV: CPT | Mod: PBBFAC,,, | Performed by: PHYSICIAN ASSISTANT

## 2017-11-15 PROCEDURE — 83690 ASSAY OF LIPASE: CPT

## 2017-11-15 PROCEDURE — 80053 COMPREHEN METABOLIC PANEL: CPT

## 2017-11-15 PROCEDURE — 99213 OFFICE O/P EST LOW 20 MIN: CPT | Mod: S$GLB,,, | Performed by: PHYSICIAN ASSISTANT

## 2017-11-15 PROCEDURE — 85025 COMPLETE CBC W/AUTO DIFF WBC: CPT

## 2017-11-15 RX ORDER — IBUPROFEN 600 MG/1
600 TABLET ORAL EVERY 6 HOURS PRN
COMMUNITY
End: 2017-11-15

## 2017-11-15 RX ORDER — OMEPRAZOLE 40 MG/1
40 CAPSULE, DELAYED RELEASE ORAL EVERY MORNING
Qty: 30 CAPSULE | Refills: 1 | Status: SHIPPED | OUTPATIENT
Start: 2017-11-15 | End: 2017-11-29

## 2017-11-15 NOTE — PROGRESS NOTES
Subjective:       Patient ID: Deja Theodore is a 47 y.o. female.        Chief Complaint: Abdominal Pain    Deja Theodore is an established patient of Fransisco Sepulveda MD here today for urgent care visit.    4 years ago dx with gallstones per her report but she was pregnant at the time.    A couple weeks ago started having intermittent upper abdominal pain.  Triggered by eating (cramping and bloating).  She notes her lips have been very dry the past couple weeks.  She also notes she takes a lot of ibuprofen (600 mg qday-BID several days weekly).  No nausea or vomiting.  No diarrhea or constipation.  She feels reflux and burning as well.             Review of Systems   Constitutional: Negative for activity change, chills, diaphoresis, fatigue, fever and unexpected weight change.   HENT: Negative for congestion, hearing loss, rhinorrhea, sore throat and trouble swallowing.         Dry lips   Eyes: Negative for discharge and visual disturbance.   Respiratory: Negative for cough, chest tightness, shortness of breath and wheezing.    Cardiovascular: Negative for chest pain, palpitations and leg swelling.   Gastrointestinal: Positive for abdominal pain. Negative for blood in stool, constipation, diarrhea, nausea and vomiting.        Reflux/burning   Endocrine: Negative for polydipsia and polyuria.   Genitourinary: Negative for difficulty urinating, dysuria, frequency, hematuria, menstrual problem and urgency.   Musculoskeletal: Negative for arthralgias, back pain, joint swelling and neck pain.   Skin: Negative for rash.   Neurological: Negative for dizziness, syncope, weakness and headaches.   Psychiatric/Behavioral: Negative for confusion, dysphoric mood and sleep disturbance. The patient is not nervous/anxious.        Objective:      Physical Exam   Constitutional: She appears well-developed and well-nourished. No distress.   HENT:   Head: Normocephalic and atraumatic.   Right Ear: Tympanic membrane and  "external ear normal.   Left Ear: Tympanic membrane and external ear normal.   Nose: Nose normal.   Mouth/Throat: Oropharynx is clear and moist.   Lips are somewhat dry in appearance    Eyes: Conjunctivae are normal. Pupils are equal, round, and reactive to light.   Cardiovascular: Normal rate, regular rhythm and normal heart sounds.  Exam reveals no gallop.    No murmur heard.  Pulmonary/Chest: Effort normal and breath sounds normal. No respiratory distress.   Abdominal: Soft. Normal appearance. There is no tenderness. There is no rebound, no guarding and no CVA tenderness.   Musculoskeletal: She exhibits no edema.   Neurological: She is alert.   Skin: Skin is warm and dry. She is not diaphoretic.   Psychiatric: She has a normal mood and affect.   Nursing note and vitals reviewed.      Assessment:       1. Upper abdominal pain        Plan:       Deja was seen today for abdominal pain.    Diagnoses and all orders for this visit:    Upper abdominal pain  -     omeprazole (PRILOSEC) 40 MG capsule; Take 1 capsule (40 mg total) by mouth every morning.  -     CBC auto differential; Future  -     Comprehensive metabolic panel; Future  -     Lipase; Future  -     US Abdomen Complete; Future    Stop ibuprofen and avoid any other NSAIDs.  Start Prilosec 40 mg daily x 2-4 weeks.  Check labs and US.  Low fat diet.      Pt has been given instructions populated from [employer] database and has verbalized understanding of the after visit summary and information contained wherein.    Follow up with a primary care provider. May go to ER for acute shortness of breath, lightheadedness, fever, or any other emergent complaints or changes in condition.    "This note will be shared with the patient"    Future Appointments  Date Time Provider Department Center   11/16/2017 10:15 AM [facility] Luis Antonio Crawley               "

## 2017-11-15 NOTE — PATIENT INSTRUCTIONS
Abdominal Pain    Abdominal pain is pain in the stomach or belly area. Everyone has this pain from time to time. In many cases it goes away on its own. But abdominal pain can sometimes be due to a serious problem, such as appendicitis. So its important to know when to seek help.  Causes of abdominal pain  There are many possible causes of abdominal pain. Common causes in adults include:  · Constipation, diarrhea, or gas  · Stomach acid flowing back up into the esophagus (acid reflux or heartburn)  · Severe acid reflux, called GERD (gastroesophageal reflux disease)  · A sore in the lining of the stomach or small intestine (peptic ulcer)  · Inflammation of the gallbladder, liver, or pancreas  · Gallstones or kidney stones  · Appendicitis   · Intestinal blockage   · An internal organ pushing through a muscle or other tissue (hernia)  · Urinary tract infections  · In women, menstrual cramps, fibroids, or endometriosis  · Inflammation or infection of the intestines  Diagnosing the cause of abdominal pain  Your healthcare provider will do a physical exam help find the cause of your pain. If needed, tests will be ordered. Belly pain has many possible causes. So it can be hard to find the reason for your pain. Giving details about your pain can help. Tell your provider where and when you feel the pain, and what makes it better or worse. Also let your provider know if you have other symptoms such as:  · Fever  · Tiredness  · Upset stomach (nausea)  · Vomiting  · Changes in bathroom habits  Treating abdominal pain  Some causes of pain need emergency medical treatment right away. These include appendicitis or a bowel blockage. Other problems can be treated with rest, fluids, or medicines. Your healthcare provider can give you specific instructions for treatment or self-care based on what is causing your pain.  If you have vomiting or diarrhea, sip water or other clear fluids. When you are ready to eat solid foods again,  start with small amounts of easy-to-digest, low-fat foods. These include apple sauce, toast, or crackers.   When to seek medical care  Call 911 or go to the hospital right away if you:  · Cant pass stool and are vomiting  · Are vomiting blood or have bloody diarrhea or black, tarry diarrhea  · Have chest, neck, or shoulder pain  · Feel like you might pass out  · Have pain in your shoulder blades with nausea  · Have sudden, severe belly pain  · Have new, severe pain unlike any you have felt before  · Have a belly that is rigid, hard, and tender to touch  Call your healthcare provider if you have:  · Pain for more than 5 days  · Bloating for more than 2 days  · Diarrhea for more than 5 days  · A fever of 100.4°F (38.0°C) or higher, or as directed by your provider  · Pain that gets worse  · Weight loss for no reason  · Continued lack of appetite  · Blood in your stool  How to prevent abdominal pain  Here are some tips to help prevent abdominal pain:  · Eat smaller amounts of food at one time.  · Avoid greasy, fried, or other high-fat foods.  · Avoid foods that give you gas.  · Exercise regularly.  · Drink plenty of fluids.  To help prevent GERD symptoms:  · Quit smoking.  · Reduce alcohol and certain foods that increase stomach acid.  · Avoid aspirin and over-the-counter pain and fever medicines (NSAIDS or nonsteroidal anti-inflammatory drugs), if possible  · Lose extra weight.  · Finish eating at least 2 hours before you go to bed or lie down.  · Raise the head of your bed.  Date Last Reviewed: 7/1/2016  © 9028-9023 [employer]. [address] [place] [address], [place] [address]. All rights reserved. This information is not intended as a substitute for professional medical care. Always follow your healthcare professional's instructions.

## 2017-11-16 ENCOUNTER — TELEPHONE (OUTPATIENT)
Dept: INTERNAL MEDICINE | Facility: CLINIC | Age: 47
End: 2017-11-16

## 2017-11-16 ENCOUNTER — PATIENT MESSAGE (OUTPATIENT)
Dept: INTERNAL MEDICINE | Facility: CLINIC | Age: 47
End: 2017-11-16

## 2017-11-16 ENCOUNTER — HOSPITAL ENCOUNTER (OUTPATIENT)
Dept: RADIOLOGY | Facility: HOSPITAL | Age: 47
Discharge: HOME | End: 2017-11-16
Attending: PHYSICIAN ASSISTANT
Payer: COMMERCIAL

## 2017-11-16 DIAGNOSIS — R10.10 UPPER ABDOMINAL PAIN: ICD-10-CM

## 2017-11-16 DIAGNOSIS — K80.20 CALCULUS OF GALLBLADDER WITHOUT CHOLECYSTITIS WITHOUT OBSTRUCTION: ICD-10-CM

## 2017-11-16 DIAGNOSIS — K80.20 GALLSTONES: Primary | ICD-10-CM

## 2017-11-16 DIAGNOSIS — K82.4 GALL BLADDER POLYP: Primary | ICD-10-CM

## 2017-11-16 PROCEDURE — 76700 US EXAM ABDOM COMPLETE: CPT | Mod: TC

## 2017-11-16 PROCEDURE — 76700 US EXAM ABDOM COMPLETE: CPT | Mod: 26,,, | Performed by: RADIOLOGY

## 2017-11-16 NOTE — TELEPHONE ENCOUNTER
Please call patient and let her know the ultrasound shows gallstones, possible gallbladder polyp.  She needs to see general surgery for further eval.  Referral placed, please call patient to schedule.

## 2017-11-16 NOTE — TELEPHONE ENCOUNTER
The pt was informed her ultrasound results and verbalized that she understood. The pt stated that she already scheduled her general surgery appt.

## 2017-11-27 ENCOUNTER — TELEPHONE (OUTPATIENT)
Dept: INTERNAL MEDICINE | Facility: CLINIC | Age: 47
End: 2017-11-27

## 2017-11-27 NOTE — TELEPHONE ENCOUNTER
----- Message from Fransisco Amato MD sent at 11/16/2017  2:48 PM CST -----  The hemangioma seems stable over the past 3 years since last scan. I don't think further workup is needed. I put in a Gen Surg referral.    Thank you.    MM    ----- Message -----  From: Danielle Glass PA-C  Sent: 11/16/2017   1:19 PM  To: MD Dr. Lima Gao,    Will you please review?  I will get her set up with general surgery for the gallstones/possible polyp.    In regard to the possible hemangioma, any further evaluation needed?      Let me know.    Thanks!  Danielle

## 2017-11-29 ENCOUNTER — OFFICE VISIT (OUTPATIENT)
Dept: SURGERY | Facility: CLINIC | Age: 47
End: 2017-11-29
Payer: COMMERCIAL

## 2017-11-29 VITALS
BODY MASS INDEX: 20.82 KG/M2 | DIASTOLIC BLOOD PRESSURE: 61 MMHG | HEIGHT: 62 IN | HEART RATE: 71 BPM | SYSTOLIC BLOOD PRESSURE: 103 MMHG | TEMPERATURE: 98 F | WEIGHT: 113.13 LBS

## 2017-11-29 DIAGNOSIS — K80.20 GALLSTONES: Primary | ICD-10-CM

## 2017-11-29 PROCEDURE — 99999 PR PBB SHADOW E&M-EST. PATIENT-LVL III: CPT | Mod: PBBFAC,,, | Performed by: SURGERY

## 2017-11-29 PROCEDURE — 99203 OFFICE O/P NEW LOW 30 MIN: CPT | Mod: S$GLB,,, | Performed by: SURGERY

## 2017-11-29 NOTE — PROGRESS NOTES
GENERAL SURGERY  H&P      REASON FOR CONSULT:    Encounter Diagnosis   Name Primary?    Gallstones Yes       SUBJECTIVE:     HISTORY OF PRESENT ILLNESS:  Deja Theodore is a 47 y.o. female who has been referred to surgery clinic for gallstones.    Patient reports a month or two of lower abdominal pain particularly after eating fatty foods.  This is also accompanied by diarrhea. Symptoms occur typically every other day.  No nausea or vomitING.  She otherwise reports doing well without major complaints  Thinks anxiety over lw                                                                                                                            The patient's past surgical history is pertinent for no prior abdominal surgeries.      MEDICATIONS:  Home Medications:  Current Outpatient Prescriptions on File Prior to Visit   Medication Sig Dispense Refill    urea 10 % Lotn Apply 1 application topically once daily. To dry skin on the feet. 180 mL 6    [DISCONTINUED] omeprazole (PRILOSEC) 40 MG capsule Take 1 capsule (40 mg total) by mouth every morning. 30 capsule 1     No current facility-administered medications on file prior to visit.        ALLERGIES:    Review of patient's allergies indicates:   Allergen Reactions    No known allergies        PAST MEDICAL HISTORY:    Past Medical History:   Diagnosis Date    Anemia     Arthritis     Carpal tunnel syndrome     Keratitis secondary to contact lens     OD    Other disorders of eyelid(374.89)     MGD    Spondylolisthesis        SURGICAL HISTORY:  Past Surgical History:   Procedure Laterality Date    ADENOIDECTOMY      NOSE SURGERY      Age 18-cosmetic    TONSILLECTOMY         FAMILY HISTORY:  Family History   Problem Relation Age of Onset    Depression Mother     Mental illness Mother     Hypertension Mother     Hypertension Father     Cancer Sister     Asthma Brother     Mental illness Brother     Asthma Son     Amblyopia Neg Hx     Blindness  Neg Hx     Cataracts Neg Hx     Diabetes Neg Hx     Glaucoma Neg Hx     Macular degeneration Neg Hx     Retinal detachment Neg Hx     Strabismus Neg Hx     Stroke Neg Hx     Thyroid disease Neg Hx     Breast cancer Neg Hx        SOCIAL HISTORY:  Social History   Substance Use Topics    Smoking status: Former Smoker     Packs/day: 0.30     Years: 10.00     Types: Cigarettes     Quit date: 9/26/2005    Smokeless tobacco: Never Used    Alcohol use No      Comment: Rare        REVIEW OF SYSTEMS:  A 10-point review of systems is negative except for the above mentioned in the HPI.    OBJECTIVE:     Most Recent Vitals:  Temp: 98.2 °F (36.8 °C) (11/29/17 0908)  Pulse: 71 (11/29/17 0908)  BP: 103/61 (11/29/17 0908)      PHYSICAL EXAM:  AAO, NAD, well developed and well nourished.  Head normocephalic, atraumatic.  Trachea midline, neck supple.  Respirations unlabored with good inspiratory effort.  Heart regular rate and rhythm.  Abdomen soft, thin, nondistended, nontender to palpation.        LABORATORY VALUES:  Lab Results   Component Value Date    WBC 6.03 11/15/2017    HGB 12.6 11/15/2017    HCT 38.2 11/15/2017     11/15/2017    HGBA1C 5.1 06/09/2017     Lab Results   Component Value Date     11/15/2017    K 4.4 11/15/2017     11/15/2017    CO2 27 11/15/2017    BUN 11 11/15/2017    CREATININE 0.8 11/15/2017    GLU 92 11/15/2017    CALCIUM 10.0 11/15/2017    AST 18 11/15/2017    ALT 20 11/15/2017    ALKPHOS 60 11/15/2017    BILITOT 0.5 11/15/2017    PROT 7.2 11/15/2017    ALBUMIN 3.8 11/15/2017    AMYLASE 47 07/18/2014    LIPASE 17 11/15/2017     No results found for: INR, PTT  Lab Results   Component Value Date    TSH 1.457 06/09/2017         DIAGNOSTIC STUDIES:  US: Reviewed    ASSESSMENT:     Deja Theodore is a 47 y.o. female referred for what sounds like asymptomatic cholelithiasis, but not entirely clear.       .      PLAN:  · Will obtain HIDA scan.  · Return to clinic after  DYLAN.      Bella Lerma MD

## 2017-11-29 NOTE — LETTER
November 29, 2017      Fransisco Amato MD  [address]  [place] [address]           [facility]  [address]  [place] [address]  Phone: [phone]          Patient: Deja Theodore   MR Number: [id number]   YOB: 1970   Date of Visit: 11/29/2017       Dear Dr. Fransisco Amato:    Thank you for referring Deja Theodore to me for evaluation. Attached you will find relevant portions of my assessment and plan of care.    If you have questions, please do not hesitate to call me. I look forward to following Deja Theodore along with you.    Sincerely,    Brian Fink MD    Enclosure  CC:  No Recipients    If you would like to receive this communication electronically, please contact [email or web address] or [phone] to request more information on [employer] access.    For providers and/or their staff who would like to refer a patient to [facility], please contact us through our one-stop-shop provider referral line, [facility], at [phone].    If you feel you have received this communication in error or would no longer like to receive these types of communications, please e-mail [email or web address]

## 2017-11-30 DIAGNOSIS — K80.20 GALLSTONES: Primary | ICD-10-CM

## 2017-12-04 ENCOUNTER — PATIENT MESSAGE (OUTPATIENT)
Dept: INTERNAL MEDICINE | Facility: CLINIC | Age: 47
End: 2017-12-04

## 2018-01-03 ENCOUNTER — PATIENT MESSAGE (OUTPATIENT)
Dept: INTERNAL MEDICINE | Facility: CLINIC | Age: 48
End: 2018-01-03

## 2018-01-03 ENCOUNTER — TELEPHONE (OUTPATIENT)
Dept: INTERNAL MEDICINE | Facility: CLINIC | Age: 48
End: 2018-01-03

## 2018-01-03 RX ORDER — PREDNISONE 20 MG/1
40 TABLET ORAL DAILY
Qty: 6 TABLET | Refills: 0 | Status: SHIPPED | OUTPATIENT
Start: 2018-01-03 | End: 2018-01-06

## 2018-01-03 RX ORDER — FLUTICASONE PROPIONATE 50 MCG
2 SPRAY, SUSPENSION (ML) NASAL DAILY
Qty: 1 BOTTLE | Refills: 5 | Status: SHIPPED | OUTPATIENT
Start: 2018-01-03 | End: 2018-11-28

## 2018-01-04 ENCOUNTER — PATIENT MESSAGE (OUTPATIENT)
Dept: INTERNAL MEDICINE | Facility: CLINIC | Age: 48
End: 2018-01-04

## 2018-01-04 DIAGNOSIS — R06.00 DYSPNEA, UNSPECIFIED TYPE: Primary | ICD-10-CM

## 2018-01-05 ENCOUNTER — OFFICE VISIT (OUTPATIENT)
Dept: INTERNAL MEDICINE | Facility: CLINIC | Age: 48
End: 2018-01-05
Attending: FAMILY MEDICINE
Payer: COMMERCIAL

## 2018-01-05 VITALS
WEIGHT: 114 LBS | SYSTOLIC BLOOD PRESSURE: 122 MMHG | TEMPERATURE: 100 F | DIASTOLIC BLOOD PRESSURE: 74 MMHG | HEIGHT: 62 IN | OXYGEN SATURATION: 98 % | HEART RATE: 89 BPM | BODY MASS INDEX: 20.98 KG/M2

## 2018-01-05 DIAGNOSIS — J98.4 PNEUMONITIS: ICD-10-CM

## 2018-01-05 DIAGNOSIS — R50.9 FEBRILE ILLNESS: Primary | ICD-10-CM

## 2018-01-05 LAB
FLUAV AG SPEC QL IA: NEGATIVE
FLUBV AG SPEC QL IA: NEGATIVE
SPECIMEN SOURCE: NORMAL

## 2018-01-05 PROCEDURE — 96372 THER/PROPH/DIAG INJ SC/IM: CPT | Mod: S$GLB,,, | Performed by: FAMILY MEDICINE

## 2018-01-05 PROCEDURE — 99999 PR PBB SHADOW E&M-EST. PATIENT-LVL III: CPT | Mod: PBBFAC,,, | Performed by: FAMILY MEDICINE

## 2018-01-05 PROCEDURE — 99214 OFFICE O/P EST MOD 30 MIN: CPT | Mod: 25,S$GLB,, | Performed by: FAMILY MEDICINE

## 2018-01-05 PROCEDURE — 87400 INFLUENZA A/B EACH AG IA: CPT

## 2018-01-05 RX ORDER — PROMETHAZINE HYDROCHLORIDE AND DEXTROMETHORPHAN HYDROBROMIDE 6.25; 15 MG/5ML; MG/5ML
5 SYRUP ORAL 3 TIMES DAILY PRN
Qty: 118 ML | Refills: 0 | Status: SHIPPED | OUTPATIENT
Start: 2018-01-05 | End: 2018-01-15

## 2018-01-05 RX ORDER — FLUOCINONIDE 0.5 MG/G
OINTMENT TOPICAL
Refills: 0 | COMMUNITY
Start: 2017-12-11 | End: 2018-11-28

## 2018-01-05 RX ORDER — KETOROLAC TROMETHAMINE 30 MG/ML
30 INJECTION, SOLUTION INTRAMUSCULAR; INTRAVENOUS
Status: COMPLETED | OUTPATIENT
Start: 2018-01-05 | End: 2018-01-05

## 2018-01-05 RX ADMIN — KETOROLAC TROMETHAMINE 30 MG: 30 INJECTION, SOLUTION INTRAMUSCULAR; INTRAVENOUS at 11:01

## 2018-01-05 NOTE — PROGRESS NOTES
Subjective:       Patient ID: Deja Theodore is a 47 y.o. female.    Chief Complaint: Influenza    HPI  Review of Systems   Constitutional: Negative for chills, fatigue and fever.   HENT: Positive for congestion and postnasal drip. Negative for trouble swallowing.    Eyes: Negative for redness.   Respiratory: Positive for cough and shortness of breath. Negative for chest tightness.    Cardiovascular: Negative for chest pain, palpitations and leg swelling.   Gastrointestinal: Negative for abdominal pain and blood in stool.   Genitourinary: Negative for hematuria and menstrual problem.   Musculoskeletal: Negative for arthralgias, back pain, gait problem, joint swelling, myalgias and neck pain.   Skin: Negative for color change and rash.   Neurological: Positive for light-headedness and headaches. Negative for tremors, speech difficulty, weakness and numbness.   Hematological: Negative for adenopathy. Does not bruise/bleed easily.   Psychiatric/Behavioral: Negative for behavioral problems, confusion and sleep disturbance. The patient is not nervous/anxious.        Objective:      Physical Exam   Constitutional: She is oriented to person, place, and time. She appears well-developed and well-nourished. No distress.   HENT:   Head: Normocephalic.   Right Ear: External ear and ear canal normal. Tympanic membrane is injected and retracted.   Left Ear: External ear and ear canal normal. Tympanic membrane is injected and retracted.   Nose: Nose normal.   Mouth/Throat: Oropharynx is clear and moist. No oropharyngeal exudate.   Eyes: Conjunctivae are normal. Right eye exhibits no discharge. Left eye exhibits no discharge. No scleral icterus.   Neck: Normal range of motion. Neck supple. No thyromegaly present.   Cardiovascular: Normal rate, regular rhythm, normal heart sounds and intact distal pulses.  Exam reveals no gallop and no friction rub.    No murmur heard.  Pulmonary/Chest: Effort normal and breath sounds normal. No  respiratory distress. She has no wheezes. She has no rales. She exhibits no tenderness.   Abdominal: Soft. There is no tenderness.   Musculoskeletal: She exhibits no edema.   Lymphadenopathy:     She has no cervical adenopathy.   Neurological: She is alert and oriented to person, place, and time.   Skin: Skin is warm and dry. No rash noted. She is not diaphoretic.   Nursing note and vitals reviewed.      Assessment:       1. Febrile illness    2. Pneumonitis        Plan:   Deja was seen today for influenza.    Diagnoses and all orders for this visit:    Febrile illness  -     Influenza antigen Nasopharyngeal Swab    Pneumonitis    Other orders  -     ketorolac injection 30 mg; Inject 30 mg into the muscle one time.  -     promethazine-dextromethorphan (PROMETHAZINE-DM) 6.25-15 mg/5 mL Syrp; Take 5 mLs by mouth 3 (three) times daily as needed.      See meds, orders, follow up, routing and instructions sections of encounter.  One to two-week history of cough and cold symptoms.  The patient states it is   fairly dramatic.  She is describing a headache, which is described as migrainous   with light sensitivity.  She is having some sinus pressure, cough, congestion.    There is no dyspnea reported at this time and the patient would not like to   pursue a chest x-ray at this time.    RECOMMENDATIONS:  Toradol for headache.  We called a prednisone pack and Flonase   in for her congestion a couple of days ago.  Phenergan DM cough suppressant   with sedation warnings and if not improving in the next few days, consider a   Z-THIAGO.  Check flu swab for historic diagnosis at this time.      RAHEEM/HN  dd: 01/05/2018 13:25:29 (CST)  td: 01/05/2018 21:54:45 (CST)  Doc ID   #[id number]  Job ID #[id number]    CC:

## 2018-01-31 ENCOUNTER — OFFICE VISIT (OUTPATIENT)
Dept: PODIATRY | Facility: CLINIC | Age: 48
End: 2018-01-31
Payer: COMMERCIAL

## 2018-01-31 VITALS
DIASTOLIC BLOOD PRESSURE: 60 MMHG | SYSTOLIC BLOOD PRESSURE: 99 MMHG | HEIGHT: 62 IN | WEIGHT: 116 LBS | HEART RATE: 70 BPM | BODY MASS INDEX: 21.35 KG/M2

## 2018-01-31 DIAGNOSIS — L85.1 PLANTAR POROKERATOSIS, ACQUIRED: Primary | ICD-10-CM

## 2018-01-31 PROCEDURE — 99999 PR PBB SHADOW E&M-EST. PATIENT-LVL III: CPT | Mod: PBBFAC,,, | Performed by: PODIATRIST

## 2018-01-31 PROCEDURE — 99213 OFFICE O/P EST LOW 20 MIN: CPT | Mod: S$GLB,,, | Performed by: PODIATRIST

## 2018-01-31 PROCEDURE — 3008F BODY MASS INDEX DOCD: CPT | Mod: S$GLB,,, | Performed by: PODIATRIST

## 2018-01-31 NOTE — PROGRESS NOTES
Chief Complaint   Patient presents with    Callouses     lt foot cornm removal          HPI:   Deja Theodore is a 47 y.o. female who presents to clinic with complaints of bilateral foot pain, localized to arch area.  She has history of painful calluses sub metatarsal heads bilateral  She is usually in athletic shoes with arch supports.       PMH:  Patient Active Problem List   Diagnosis    Sciatica    Spondylosis with myelopathy, lumbar region    Degeneration of lumbar or lumbosacral intervertebral disc    Spondylolysis of lumbar region    Spondylolisthesis    Myopia of both eyes - Both Eyes    Gall bladder polyp    Headache, menstrual migraine, intractable    Bilateral carpal tunnel syndrome    URI (upper respiratory infection)    Calculus of gallbladder without cholecystitis without obstruction         Current Outpatient Prescriptions on File Prior to Visit   Medication Sig Dispense Refill    fluocinonide (LIDEX) 0.05 % ointment MIKE TO LIPS BID FOR 2 WKS  0    fluticasone (FLONASE) 50 mcg/actuation nasal spray 2 sprays by Each Nare route once daily. 1 Bottle 5    urea 10 % Lotn Apply 1 application topically once daily. To dry skin on the feet. 180 mL 6     No current facility-administered medications on file prior to visit.          ALLG:  Review of patient's allergies indicates:   Allergen Reactions    No known allergies            Social History     Social History    Marital status:      Spouse name: N/A    Number of children: 2    Years of education: N/A     Occupational History     Not Employed     Social History Main Topics    Smoking status: Former Smoker     Packs/day: 0.30     Years: 10.00     Types: Cigarettes     Quit date: 9/26/2005    Smokeless tobacco: Never Used    Alcohol use No      Comment: Rare    Drug use: No    Sexual activity: Yes     Partners: Male     Birth control/ protection: None     Other Topics Concern    Not on file     Social History Narrative     "No narrative on file         ROS:  General ROS: negative for - chills, fatigue or fever  Cardiovascular ROS: no chest pain or dyspnea on exertion  Musculoskeletal ROS: negative for - joint pain or joint stiffness   Skin: Negative for rash, negative for nail or hair changes. Positive for  Corn/callus on the foot.         EXAM:  Vitals:    01/31/18 0836   BP: 99/60   Pulse: 70   Weight: 52.6 kg (116 lb)   Height: 5' 2" (1.575 m)        General: alert and orient x 3, well-developed, well-nourished and in no apparent distress.    Vasc: Palpable pedal pulses, feet appropriately warm to touch. Capillary refill time within normal limits.   Neuro: Epicritic sensation intact.  No focal deficits. No Tinels.   MSK: bilateral mild bunion deformity, minimal pes planus, arch still maintained weight bearing. Minimal equinus bilateral   Derm:  Multiple Nucleated porokeratosis, bilateral sub 2nd met head.  Tender to palpation.  No other open lesions, macerations, or rashes noted.   No redness or swelling           Assessment / Plan:    I counseled the patient on her conditions, their implications and medical management.     Plantar porokeratosis, acquired - Left Foot    - continue custom foot orthotics or consider Spenco orthotic Arch (OTC)  - calluses trimmed as a courtesy  - never walk barefoot  - moisturize feet daily    Call or return to clinic prn if these symptoms worsen or fail to improve as anticipated.    "

## 2018-04-09 ENCOUNTER — PATIENT MESSAGE (OUTPATIENT)
Dept: OBSTETRICS AND GYNECOLOGY | Facility: CLINIC | Age: 48
End: 2018-04-09

## 2018-04-09 DIAGNOSIS — R53.83 OTHER FATIGUE: Primary | ICD-10-CM

## 2018-04-10 ENCOUNTER — LAB VISIT (OUTPATIENT)
Dept: LAB | Facility: HOSPITAL | Age: 48
End: 2018-04-10
Attending: OBSTETRICS & GYNECOLOGY
Payer: COMMERCIAL

## 2018-04-10 ENCOUNTER — PATIENT MESSAGE (OUTPATIENT)
Dept: OBSTETRICS AND GYNECOLOGY | Facility: CLINIC | Age: 48
End: 2018-04-10

## 2018-04-10 DIAGNOSIS — R53.83 OTHER FATIGUE: ICD-10-CM

## 2018-04-10 LAB
BASOPHILS # BLD AUTO: 0.02 K/UL
BASOPHILS NFR BLD: 0.3 %
DIFFERENTIAL METHOD: NORMAL
EOSINOPHIL # BLD AUTO: 0.2 K/UL
EOSINOPHIL NFR BLD: 2.9 %
ERYTHROCYTE [DISTWIDTH] IN BLOOD BY AUTOMATED COUNT: 12.3 %
ESTRADIOL SERPL-MCNC: 85 PG/ML
FSH SERPL-ACNC: 6.3 MIU/ML
HCT VFR BLD AUTO: 38.1 %
HGB BLD-MCNC: 12.8 G/DL
IMM GRANULOCYTES # BLD AUTO: 0.02 K/UL
IMM GRANULOCYTES NFR BLD AUTO: 0.3 %
LYMPHOCYTES # BLD AUTO: 1.9 K/UL
LYMPHOCYTES NFR BLD: 26.5 %
MCH RBC QN AUTO: 29 PG
MCHC RBC AUTO-ENTMCNC: 33.6 G/DL
MCV RBC AUTO: 86 FL
MONOCYTES # BLD AUTO: 0.5 K/UL
MONOCYTES NFR BLD: 7.4 %
NEUTROPHILS # BLD AUTO: 4.5 K/UL
NEUTROPHILS NFR BLD: 62.6 %
NRBC BLD-RTO: 0 /100 WBC
PLATELET # BLD AUTO: 182 K/UL
PMV BLD AUTO: 12.7 FL
RBC # BLD AUTO: 4.41 M/UL
TSH SERPL DL<=0.005 MIU/L-ACNC: 2.29 UIU/ML
WBC # BLD AUTO: 7.2 K/UL

## 2018-04-10 PROCEDURE — 85025 COMPLETE CBC W/AUTO DIFF WBC: CPT

## 2018-04-10 PROCEDURE — 82670 ASSAY OF TOTAL ESTRADIOL: CPT

## 2018-04-10 PROCEDURE — 84443 ASSAY THYROID STIM HORMONE: CPT

## 2018-04-10 PROCEDURE — 83001 ASSAY OF GONADOTROPIN (FSH): CPT

## 2018-04-10 PROCEDURE — 36415 COLL VENOUS BLD VENIPUNCTURE: CPT

## 2018-04-25 ENCOUNTER — PATIENT MESSAGE (OUTPATIENT)
Dept: OBSTETRICS AND GYNECOLOGY | Facility: CLINIC | Age: 48
End: 2018-04-25

## 2018-05-24 ENCOUNTER — TELEPHONE (OUTPATIENT)
Dept: OPHTHALMOLOGY | Facility: CLINIC | Age: 48
End: 2018-05-24

## 2018-05-24 NOTE — TELEPHONE ENCOUNTER
----- Message from Stephen Davidson sent at 5/24/2018  4:34 PM CDT -----  Contact: Deja  Ms. Theodore needs her contact lens prescription because she will be going out town and needs a pair in case something happen to her lens. She can be reached at [phone]

## 2018-08-07 ENCOUNTER — TELEPHONE (OUTPATIENT)
Dept: OBSTETRICS AND GYNECOLOGY | Facility: CLINIC | Age: 48
End: 2018-08-07

## 2018-08-07 ENCOUNTER — PATIENT MESSAGE (OUTPATIENT)
Dept: OBSTETRICS AND GYNECOLOGY | Facility: CLINIC | Age: 48
End: 2018-08-07

## 2018-08-07 DIAGNOSIS — Z12.31 VISIT FOR SCREENING MAMMOGRAM: Primary | ICD-10-CM

## 2018-08-21 ENCOUNTER — OFFICE VISIT (OUTPATIENT)
Dept: OPTOMETRY | Facility: CLINIC | Age: 48
End: 2018-08-21
Payer: COMMERCIAL

## 2018-08-21 DIAGNOSIS — H52.13 MYOPIA WITH ASTIGMATISM AND PRESBYOPIA, BILATERAL: Primary | ICD-10-CM

## 2018-08-21 DIAGNOSIS — Z46.0 FITTING AND ADJUSTMENT OF SPECTACLES AND CONTACT LENSES: Primary | ICD-10-CM

## 2018-08-21 DIAGNOSIS — H52.203 MYOPIA WITH ASTIGMATISM AND PRESBYOPIA, BILATERAL: Primary | ICD-10-CM

## 2018-08-21 DIAGNOSIS — H52.4 MYOPIA WITH ASTIGMATISM AND PRESBYOPIA, BILATERAL: Primary | ICD-10-CM

## 2018-08-21 PROCEDURE — 99999 PR PBB SHADOW E&M-EST. PATIENT-LVL II: CPT | Mod: PBBFAC,,, | Performed by: OPTOMETRIST

## 2018-08-21 PROCEDURE — 92014 COMPRE OPH EXAM EST PT 1/>: CPT | Mod: S$GLB,,, | Performed by: OPTOMETRIST

## 2018-08-21 PROCEDURE — 92310 CONTACT LENS FITTING OU: CPT | Mod: ,,, | Performed by: OPTOMETRIST

## 2018-08-21 PROCEDURE — 92015 DETERMINE REFRACTIVE STATE: CPT | Mod: S$GLB,,, | Performed by: OPTOMETRIST

## 2018-08-21 NOTE — PROGRESS NOTES
HPI     Using tears before and after contacts    Last edited by Hoang Palacio, OD on 8/21/2018  8:36 AM. (History)            Assessment /Plan     For exam results, see Encounter Report.    Myopia with astigmatism and presbyopia, bilateral      1. Spec Rx given and Contact lens Rx released to pt. Daily wear only advised, with education to risks of extended wear.  Discussed lens care, compliance and solutions. RTC yearly contact lens follow up.   . Different lens options discussed with patient. RTC 1 year full exam.

## 2018-08-26 ENCOUNTER — PATIENT MESSAGE (OUTPATIENT)
Dept: OPTOMETRY | Facility: CLINIC | Age: 48
End: 2018-08-26

## 2018-09-18 ENCOUNTER — PATIENT MESSAGE (OUTPATIENT)
Dept: OBSTETRICS AND GYNECOLOGY | Facility: CLINIC | Age: 48
End: 2018-09-18

## 2018-11-02 ENCOUNTER — OFFICE VISIT (OUTPATIENT)
Dept: INTERNAL MEDICINE | Facility: CLINIC | Age: 48
End: 2018-11-02
Payer: COMMERCIAL

## 2018-11-02 VITALS
OXYGEN SATURATION: 99 % | TEMPERATURE: 99 F | DIASTOLIC BLOOD PRESSURE: 70 MMHG | HEIGHT: 62 IN | SYSTOLIC BLOOD PRESSURE: 110 MMHG | WEIGHT: 112.44 LBS | BODY MASS INDEX: 20.69 KG/M2 | HEART RATE: 78 BPM

## 2018-11-02 DIAGNOSIS — L03.019 PARONYCHIA OF FINGER, UNSPECIFIED LATERALITY: Primary | ICD-10-CM

## 2018-11-02 PROCEDURE — 3008F BODY MASS INDEX DOCD: CPT | Mod: CPTII,S$GLB,, | Performed by: NURSE PRACTITIONER

## 2018-11-02 PROCEDURE — 99999 PR PBB SHADOW E&M-EST. PATIENT-LVL IV: CPT | Mod: PBBFAC,,, | Performed by: NURSE PRACTITIONER

## 2018-11-02 PROCEDURE — 99213 OFFICE O/P EST LOW 20 MIN: CPT | Mod: S$GLB,,, | Performed by: NURSE PRACTITIONER

## 2018-11-02 RX ORDER — CEPHALEXIN 500 MG/1
500 CAPSULE ORAL EVERY 6 HOURS
Qty: 40 CAPSULE | Refills: 0 | Status: SHIPPED | OUTPATIENT
Start: 2018-11-02 | End: 2018-11-28

## 2018-11-02 RX ORDER — MUPIROCIN 20 MG/G
OINTMENT TOPICAL 3 TIMES DAILY
Qty: 30 G | Refills: 1 | Status: SHIPPED | OUTPATIENT
Start: 2018-11-02 | End: 2018-11-28

## 2018-11-02 NOTE — PROGRESS NOTES
Subjective:       Patient ID: Deja Theodore is a 48 y.o. female.    Chief Complaint: Hand Pain (swollen right pinky finger for 1 week)    Disclaimer: This note has been generated using voice-recognition software. There may be typographical errors that have been missed during proof-reading    Pt of Dr Amato here complaining of swelling around the right 5th finger nail for the past 2 days.  There has been no drainage.      Hand Pain    Pertinent negatives include no chest pain.     Review of Systems   Constitutional: Negative for activity change and unexpected weight change.   HENT: Negative for hearing loss, rhinorrhea and trouble swallowing.    Eyes: Negative for discharge and visual disturbance.   Respiratory: Negative for chest tightness and wheezing.    Cardiovascular: Negative for chest pain and palpitations.   Gastrointestinal: Negative for blood in stool, constipation, diarrhea and vomiting.   Endocrine: Negative for polydipsia and polyuria.   Genitourinary: Negative for difficulty urinating, dysuria, hematuria and menstrual problem.   Musculoskeletal: Negative for arthralgias, joint swelling and neck pain.   Neurological: Positive for headaches. Negative for weakness.   Psychiatric/Behavioral: Negative for confusion and dysphoric mood.         Past Medical History:   Diagnosis Date    Anemia     Arthritis     Carpal tunnel syndrome     Keratitis secondary to contact lens     OD    Other disorders of eyelid(374.89)     MGD    Spondylolisthesis      Past Surgical History:   Procedure Laterality Date    ADENOIDECTOMY      NOSE SURGERY      Age 18-cosmetic    TONSILLECTOMY       Social History     Social History Narrative    Not on file     Family History   Problem Relation Age of Onset    Depression Mother     Mental illness Mother     Hypertension Mother     Hypertension Father     Cancer Sister     Asthma Brother     Mental illness Brother     Asthma Son     Amblyopia Neg Hx      "Blindness Neg Hx     Cataracts Neg Hx     Diabetes Neg Hx     Glaucoma Neg Hx     Macular degeneration Neg Hx     Retinal detachment Neg Hx     Strabismus Neg Hx     Stroke Neg Hx     Thyroid disease Neg Hx     Breast cancer Neg Hx      Outpatient Encounter Medications as of 11/2/2018   Medication Sig Dispense Refill    fluocinonide (LIDEX) 0.05 % ointment MIKE TO LIPS BID FOR 2 WKS  0    fluticasone (FLONASE) 50 mcg/actuation nasal spray 2 sprays by Each Nare route once daily. 1 Bottle 5    urea 10 % Lotn Apply 1 application topically once daily. To dry skin on the feet. 180 mL 6    cephALEXin (KEFLEX) 500 MG capsule Take 1 capsule (500 mg total) by mouth every 6 (six) hours. 40 capsule 0    mupirocin (BACTROBAN) 2 % ointment Apply topically 3 (three) times daily. Apply with a Qtip 30 g 1     No facility-administered encounter medications on file as of 11/2/2018.      Last 3 sets of Vitals  Vitals - 1 value per visit 1/5/2018 1/31/2018 11/2/2018   SYSTOLIC 122 99 110   DIASTOLIC 74 60 70   PULSE 89 70 78   TEMPERATURE 100.3 - 98.7   RESPIRATIONS - - -   SPO2 98 - 99   Weight (lb) 114 116 112.43   Weight (kg) 51.71 52.617 51   HEIGHT 5' 2" 5' 2" 5' 2"   BODY MASS INDEX 20.85 21.22 20.56   VISIT REPORT - - -   Pain Score  10 4 0   Some recent data might be hidden         Objective:      Physical Exam   Constitutional: She is oriented to person, place, and time. She appears well-developed and well-nourished. No distress.   Pulmonary/Chest: Effort normal.   Neurological: She is alert and oriented to person, place, and time.   Skin: Skin is warm and dry. Capillary refill takes less than 2 seconds. No rash noted. She is not diaphoretic. There is erythema. No pallor.   Mild erythema and swelling around nail of 5th finger on right hand, no areas of fluctuance noted  Cap rf Brisk  Motor and sensation intact distally     Psychiatric: She has a normal mood and affect. Her behavior is normal. Judgment and thought " content normal.   Nursing note and vitals reviewed.          Lab Results   Component Value Date    WBC 7.20 04/10/2018    RBC 4.41 04/10/2018    HGB 12.8 04/10/2018    HCT 38.1 04/10/2018    MCV 86 04/10/2018    MCH 29.0 04/10/2018    MCHC 33.6 04/10/2018    RDW 12.3 04/10/2018     04/10/2018    MPV 12.7 04/10/2018    GRAN 4.5 04/10/2018    GRAN 62.6 04/10/2018    LYMPH 1.9 04/10/2018    LYMPH 26.5 04/10/2018    MONO 0.5 04/10/2018    MONO 7.4 04/10/2018    EOS 0.2 04/10/2018    BASO 0.02 04/10/2018    EOSINOPHIL 2.9 04/10/2018    BASOPHIL 0.3 04/10/2018     Lab Results   Component Value Date    WBC 7.20 04/10/2018    HGB 12.8 04/10/2018    HCT 38.1 04/10/2018     04/10/2018    CHOL 140 06/09/2017    TRIG 47 06/09/2017    HDL 54 06/09/2017    ALT 20 11/15/2017    AST 18 11/15/2017     11/15/2017    K 4.4 11/15/2017     11/15/2017    CREATININE 0.8 11/15/2017    BUN 11 11/15/2017    CO2 27 11/15/2017    TSH 2.295 04/10/2018    GLUF 74 11/20/2015    HGBA1C 5.1 06/09/2017       Assessment:       1. Paronychia of finger, unspecified laterality        Plan:           Deja was seen today for hand pain.    Diagnoses and all orders for this visit:    Paronychia of finger, unspecified laterality  -     mupirocin (BACTROBAN) 2 % ointment; Apply topically 3 (three) times daily. Apply with a Qtip  -     cephALEXin (KEFLEX) 500 MG capsule; Take 1 capsule (500 mg total) by mouth every 6 (six) hours.      Patient Instructions     Paronychia of the Finger or Toe  Paronychia is an infection near a fingernail or toenail. It usually occurs when an opening in the cuticle or an ingrown toenail lets bacteria under the skin.  The infection will need to be drained if pus is present. If the infection has been caught early, you may need only antibiotic treatment. Healing will take about 1 to 2 weeks.  Home care  Follow these guidelines when caring for yourself at home:  · Clean and soak the toe or finger. Do  this 2 times a day for the first 3 days. To do so:  ¨ Soak your foot or hand in a tub of warm water for 5 minutes. Or hold your toe or finger under a faucet of warm running water for 5 minutes.  ¨ Clean any crust away with soap and water using a cotton swab.  ¨ Put antibiotic ointment on the infected area.  · Change the dressing daily or any time it gets dirty.  · If you were given antibiotics, take them as directed until they are all gone.  · If your infection is on a toe, wear comfortable shoes with a lot of toe room. You can also wear open-toed sandals while your toe heals.  · You may use over-the-counter medicine (acetaminophen or ibuprofen to help with pain, unless another medicine was prescribed. If you have chronic liver or kidney disease, talk with your healthcare provider before using these medicines. Also talk with your provider if you've had a stomach ulcer or GI (gastrointestinal) bleeding.  Prevention  The following can prevent paronychia:  · Avoid cutting or playing with your cuticles at home.  · Don't bite your nails.  · Don't suck on your thumbs or fingers.  Follow-up care  Follow up with your healthcare provider, or as advised.  When to seek medical advice  Call your healthcare provider right away if any of these occur:  · Redness, pain, or swelling of the finger or toe gets worse  · Red streaks in the skin leading away from the wound  · Pus or fluid draining from the nail area  · Fever of 100.4ºF (38ºC) or higher, or as directed by your provider  Date Last Reviewed: 8/1/2016  © 9400-5893 [employer]. [address] [place] [address], [place] [address]. All rights reserved. This information is not intended as a substitute for professional medical care. Always follow your healthcare professional's instructions.

## 2018-11-02 NOTE — PATIENT INSTRUCTIONS
Paronychia of the Finger or Toe  Paronychia is an infection near a fingernail or toenail. It usually occurs when an opening in the cuticle or an ingrown toenail lets bacteria under the skin.  The infection will need to be drained if pus is present. If the infection has been caught early, you may need only antibiotic treatment. Healing will take about 1 to 2 weeks.  Home care  Follow these guidelines when caring for yourself at home:  · Clean and soak the toe or finger. Do this 2 times a day for the first 3 days. To do so:  ¨ Soak your foot or hand in a tub of warm water for 5 minutes. Or hold your toe or finger under a faucet of warm running water for 5 minutes.  ¨ Clean any crust away with soap and water using a cotton swab.  ¨ Put antibiotic ointment on the infected area.  · Change the dressing daily or any time it gets dirty.  · If you were given antibiotics, take them as directed until they are all gone.  · If your infection is on a toe, wear comfortable shoes with a lot of toe room. You can also wear open-toed sandals while your toe heals.  · You may use over-the-counter medicine (acetaminophen or ibuprofen to help with pain, unless another medicine was prescribed. If you have chronic liver or kidney disease, talk with your healthcare provider before using these medicines. Also talk with your provider if you've had a stomach ulcer or GI (gastrointestinal) bleeding.  Prevention  The following can prevent paronychia:  · Avoid cutting or playing with your cuticles at home.  · Don't bite your nails.  · Don't suck on your thumbs or fingers.  Follow-up care  Follow up with your healthcare provider, or as advised.  When to seek medical advice  Call your healthcare provider right away if any of these occur:  · Redness, pain, or swelling of the finger or toe gets worse  · Red streaks in the skin leading away from the wound  · Pus or fluid draining from the nail area  · Fever of 100.4ºF (38ºC) or higher, or as directed  by your provider  Date Last Reviewed: 8/1/2016  © 3563-6660 [employer]. [address] [place] [address], [place] [address]. All rights reserved. This information is not intended as a substitute for professional medical care. Always follow your healthcare professional's instructions.

## 2018-11-07 ENCOUNTER — PATIENT MESSAGE (OUTPATIENT)
Dept: INTERNAL MEDICINE | Facility: CLINIC | Age: 48
End: 2018-11-07

## 2018-11-07 DIAGNOSIS — L02.91 ABSCESS: Primary | ICD-10-CM

## 2018-11-07 NOTE — TELEPHONE ENCOUNTER
Tiff please call pt and have her call the [facility] and schedule appt for incision and drainage of abscess  I have put the referral in

## 2018-11-08 ENCOUNTER — OFFICE VISIT (OUTPATIENT)
Dept: ORTHOPEDICS | Facility: CLINIC | Age: 48
End: 2018-11-08
Payer: COMMERCIAL

## 2018-11-08 VITALS — HEIGHT: 62 IN | WEIGHT: 112 LBS | BODY MASS INDEX: 20.61 KG/M2

## 2018-11-08 DIAGNOSIS — L03.011 PARONYCHIA OF FINGER, RIGHT: ICD-10-CM

## 2018-11-08 DIAGNOSIS — L03.011 PARONYCHIA OF FINGER OF RIGHT HAND: Primary | ICD-10-CM

## 2018-11-08 PROCEDURE — 99999 PR PBB SHADOW E&M-EST. PATIENT-LVL III: CPT | Mod: PBBFAC,,, | Performed by: ORTHOPAEDIC SURGERY

## 2018-11-08 PROCEDURE — 99203 OFFICE O/P NEW LOW 30 MIN: CPT | Mod: 25,S$GLB,, | Performed by: ORTHOPAEDIC SURGERY

## 2018-11-08 PROCEDURE — 97597 DBRDMT OPN WND 1ST 20 CM/<: CPT | Mod: RT,S$GLB,, | Performed by: ORTHOPAEDIC SURGERY

## 2018-11-08 PROCEDURE — 87070 CULTURE OTHR SPECIMN AEROBIC: CPT

## 2018-11-08 PROCEDURE — 3008F BODY MASS INDEX DOCD: CPT | Mod: CPTII,S$GLB,, | Performed by: ORTHOPAEDIC SURGERY

## 2018-11-08 RX ORDER — HYDROCODONE BITARTRATE AND IBUPROFEN 7.5; 2 MG/1; MG/1
1 TABLET, FILM COATED ORAL EVERY 8 HOURS PRN
Qty: 20 TABLET | Refills: 0 | Status: SHIPPED | OUTPATIENT
Start: 2018-11-08 | End: 2018-11-28

## 2018-11-08 RX ORDER — CLINDAMYCIN HYDROCHLORIDE 300 MG/1
300 CAPSULE ORAL 3 TIMES DAILY
Qty: 30 CAPSULE | Refills: 0 | Status: SHIPPED | OUTPATIENT
Start: 2018-11-08 | End: 2018-11-18

## 2018-11-08 NOTE — LETTER
November 8, 2018        Fransisco Amato MD  [address]  [facility] [address]             [facility]  [address] [place] [address]  [place] [address]  Phone: [phone]   Patient: Deja Theodore   MR Number: [id number]   YOB: 1970   Date of Visit: 11/8/2018       Dear Dr. Amato:    Thank you for referring Deja Theodore to me for evaluation. Below are the relevant portions of my assessment and plan of care.            If you have questions, please do not hesitate to call me. I look forward to following Deja along with you.    Sincerely,      Eran Pang Jr., MD           CC  No Recipients

## 2018-11-08 NOTE — PROGRESS NOTES
INITIAL VISIT HISTORY:  A 48-year-old female presents for evaluation of painful   swelling of the right small finger for the past 7-10 days.  Symptoms initially   caused some redness surrounding the nail, seemed to get worse, causing swelling,   tenderness and whitish drainage around the nail itself.  She has been on Keflex   for a week without much improvement.  Pain is getting worse.  No trauma   reported.    PAST MEDICAL HISTORY:  Significant for anemia, arthritis, carpal tunnel syndrome   and spondylolisthesis.    PAST SURGICAL HISTORY:  Includes tonsillectomy, adenoidectomy, nasal surgery.    FAMILY HISTORY:  Positive for asthma, mental illness, hypertension.    SOCIAL HISTORY:  The patient is a former smoker.  She does not drink alcohol.    REVIEW OF SYSTEMS:  Negative fever, chills, rashes.    CURRENT MEDICATIONS:  Reviewed on chart.    ALLERGIES:  None.    PHYSICAL EXAMINATION:  GENERAL:  Well-developed, well-nourished female in no acute distress, alert and   oriented x3.  EXTREMITIES:  Examination of the upper extremities significant for the right   hand, demonstrating swelling, redness and tenderness around the nail of the   right small finger.  There is some fluctuance and tenderness as well as purulent   material present.  There is no drainage; however.  The nail bed itself is   intact.  Nail plate is intact.    No x-rays.    IMPRESSION:  Paronychial abscess right small finger.    PLAN:  I explained the nature of the problem to the patient.  Recommended I and   D in the office today.    After pause for timeout, the patient identified the right small finger, injected   at the base with Xylocaine 1% performing a digital block to the small finger.    Following this, the finger was prepped and small scalpel was used to open the   abscess and drain it.  It did extend down all the way to the volar side of the   finger, which was probed with a hemostat and thoroughly irrigated with sterile   saline.    A  sterile dressing applied followed by light wrap.  Cultures were taken.    The patient was instructed to elevate the hand tonight, keep it dressed and   change the bandage tomorrow, at which time she can start gentle soap and water   and then a light bandage.  We will go ahead and change her from Keflex to   clindamycin since this may be something like MRSA and Vicoprofen given for pain.    Follow up in one week for close followup.      ADRIAN  dd: 11/08/2018 15:55:06 (CST)  td: 11/09/2018 06:32:10 (CST)  Doc ID   #[id number]  Job ID #[id number]    CC:

## 2018-11-12 LAB — BACTERIA SPEC AEROBE CULT: NORMAL

## 2018-11-15 ENCOUNTER — OFFICE VISIT (OUTPATIENT)
Dept: ORTHOPEDICS | Facility: CLINIC | Age: 48
End: 2018-11-15
Payer: COMMERCIAL

## 2018-11-15 VITALS — BODY MASS INDEX: 20.61 KG/M2 | HEIGHT: 62 IN | WEIGHT: 112 LBS

## 2018-11-15 DIAGNOSIS — L03.011 PARONYCHIA OF FINGER, RIGHT: Primary | ICD-10-CM

## 2018-11-15 PROCEDURE — 99213 OFFICE O/P EST LOW 20 MIN: CPT | Mod: S$GLB,,, | Performed by: ORTHOPAEDIC SURGERY

## 2018-11-15 PROCEDURE — 99999 PR PBB SHADOW E&M-EST. PATIENT-LVL III: CPT | Mod: PBBFAC,,, | Performed by: ORTHOPAEDIC SURGERY

## 2018-11-15 PROCEDURE — 3008F BODY MASS INDEX DOCD: CPT | Mod: CPTII,S$GLB,, | Performed by: ORTHOPAEDIC SURGERY

## 2018-11-15 NOTE — PROGRESS NOTES
HISTORY OF PRESENT ILLNESS:  Ms. Theodore in followup, I and D felon infection of   the right small finger, one week out from I and D.  She is doing well, still   reporting some soreness, but much improved.  Cultures came back showing strep,   which was sensitive.  She is currently on clindamycin.    PHYSICAL EXAMINATION:  RIGHT SMALL FINGER:  Finger looks good.  Slight swelling, slight redness, no   drainage, no fluctuance.    PLAN:  Finish up antibiotics, continue regular wound care, gentle range of   motion, follow up in three weeks.      ADRIAN  dd: 11/15/2018 09:20:26 (CST)  td: 11/15/2018 23:45:04 (CST)  Doc ID   #[id number]  Job ID #[id number]    CC:

## 2018-11-28 ENCOUNTER — HOSPITAL ENCOUNTER (OUTPATIENT)
Dept: RADIOLOGY | Facility: HOSPITAL | Age: 48
Discharge: HOME | End: 2018-11-28
Attending: OBSTETRICS & GYNECOLOGY
Payer: COMMERCIAL

## 2018-11-28 ENCOUNTER — OFFICE VISIT (OUTPATIENT)
Dept: OBSTETRICS AND GYNECOLOGY | Facility: CLINIC | Age: 48
End: 2018-11-28
Attending: OBSTETRICS & GYNECOLOGY
Payer: COMMERCIAL

## 2018-11-28 VITALS
HEIGHT: 62 IN | SYSTOLIC BLOOD PRESSURE: 96 MMHG | BODY MASS INDEX: 20.98 KG/M2 | WEIGHT: 114 LBS | DIASTOLIC BLOOD PRESSURE: 60 MMHG

## 2018-11-28 DIAGNOSIS — Z12.31 VISIT FOR SCREENING MAMMOGRAM: ICD-10-CM

## 2018-11-28 DIAGNOSIS — Z11.3 SCREENING FOR VENEREAL DISEASE: ICD-10-CM

## 2018-11-28 DIAGNOSIS — Z01.419 WELL FEMALE EXAM WITH ROUTINE GYNECOLOGICAL EXAM: Primary | ICD-10-CM

## 2018-11-28 LAB
C TRACH DNA SPEC QL NAA+PROBE: NOT DETECTED
CANDIDA RRNA VAG QL PROBE: NEGATIVE
G VAGINALIS RRNA GENITAL QL PROBE: NEGATIVE
N GONORRHOEA DNA SPEC QL NAA+PROBE: NOT DETECTED
T VAGINALIS RRNA GENITAL QL PROBE: NEGATIVE

## 2018-11-28 PROCEDURE — 77067 SCR MAMMO BI INCL CAD: CPT | Mod: 26,,, | Performed by: RADIOLOGY

## 2018-11-28 PROCEDURE — 87660 TRICHOMONAS VAGIN DIR PROBE: CPT

## 2018-11-28 PROCEDURE — 88175 CYTOPATH C/V AUTO FLUID REDO: CPT

## 2018-11-28 PROCEDURE — 99396 PREV VISIT EST AGE 40-64: CPT | Mod: S$GLB,,, | Performed by: OBSTETRICS & GYNECOLOGY

## 2018-11-28 PROCEDURE — 77063 BREAST TOMOSYNTHESIS BI: CPT | Mod: 26,,, | Performed by: RADIOLOGY

## 2018-11-28 PROCEDURE — 77063 BREAST TOMOSYNTHESIS BI: CPT | Mod: TC,PO

## 2018-11-28 PROCEDURE — 99999 PR PBB SHADOW E&M-EST. PATIENT-LVL III: CPT | Mod: PBBFAC,,, | Performed by: OBSTETRICS & GYNECOLOGY

## 2018-11-28 PROCEDURE — 87491 CHLMYD TRACH DNA AMP PROBE: CPT

## 2018-11-30 ENCOUNTER — OFFICE VISIT (OUTPATIENT)
Dept: PODIATRY | Facility: CLINIC | Age: 48
End: 2018-11-30
Payer: COMMERCIAL

## 2018-11-30 VITALS
WEIGHT: 113 LBS | BODY MASS INDEX: 20.8 KG/M2 | DIASTOLIC BLOOD PRESSURE: 71 MMHG | HEIGHT: 62 IN | HEART RATE: 73 BPM | SYSTOLIC BLOOD PRESSURE: 108 MMHG

## 2018-11-30 DIAGNOSIS — L84 CALLUS OF FOOT: ICD-10-CM

## 2018-11-30 PROCEDURE — 3008F BODY MASS INDEX DOCD: CPT | Mod: CPTII,S$GLB,, | Performed by: PODIATRIST

## 2018-11-30 PROCEDURE — 99999 PR PBB SHADOW E&M-EST. PATIENT-LVL II: CPT | Mod: PBBFAC,,, | Performed by: PODIATRIST

## 2018-11-30 PROCEDURE — 99214 OFFICE O/P EST MOD 30 MIN: CPT | Mod: S$GLB,,, | Performed by: PODIATRIST

## 2018-11-30 RX ORDER — UREA 1 ML/10ML
1 LOTION TOPICAL DAILY
Qty: 177 ML | Refills: 6 | Status: SHIPPED | OUTPATIENT
Start: 2018-11-30 | End: 2019-04-01 | Stop reason: SDUPTHER

## 2018-11-30 NOTE — PROGRESS NOTES
Chief Complaint   Patient presents with    Callouses     Bilateral          HPI:   Deja Thedoore is a 48 y.o. female who presents to clinic with complaints of bilateral foot pain, due to painful calluses sub metatarsal heads bilateral  She is usually in athletic shoes with arch supports.   She has used urea lotion but lately not consistently and also needs a refill        PMH:  Patient Active Problem List   Diagnosis    Sciatica    Spondylosis with myelopathy, lumbar region    Degeneration of lumbar or lumbosacral intervertebral disc    Spondylolysis of lumbar region    Spondylolisthesis    Myopia of both eyes - Both Eyes    Gall bladder polyp    Headache, menstrual migraine, intractable    Bilateral carpal tunnel syndrome    Calculus of gallbladder without cholecystitis without obstruction    Paronychia of finger, right         Current Outpatient Medications on File Prior to Visit   Medication Sig Dispense Refill    [DISCONTINUED] urea 10 % Lotn Apply 1 application topically once daily. To dry skin on the feet. 180 mL 6     No current facility-administered medications on file prior to visit.            ALLG:  Review of patient's allergies indicates:   Allergen Reactions    No known allergies            Social History     Socioeconomic History    Marital status:      Spouse name: Not on file    Number of children: 2    Years of education: Not on file    Highest education level: Not on file   Social Needs    Financial resource strain: Not on file    Food insecurity - worry: Not on file    Food insecurity - inability: Not on file    Transportation needs - medical: Not on file    Transportation needs - non-medical: Not on file   Occupational History     Employer: not employed   Tobacco Use    Smoking status: Former Smoker     Packs/day: 0.30     Years: 10.00     Pack years: 3.00     Types: Cigarettes     Last attempt to quit: 2005     Years since quittin.1    Smokeless  "tobacco: Never Used   Substance and Sexual Activity    Alcohol use: Yes     Comment: Rare    Drug use: No    Sexual activity: Yes     Partners: Male     Birth control/protection: None   Other Topics Concern    Not on file   Social History Narrative    Not on file         ROS:  General ROS: negative for - chills, fatigue or fever  Cardiovascular ROS: no chest pain or dyspnea on exertion  Musculoskeletal ROS: negative for - joint pain or joint stiffness   Skin: Negative for rash, negative for nail or hair changes. Positive for  Corn/callus on the foot.         EXAM:  Vitals:    11/30/18 0834   BP: 108/71   Pulse: 73   Weight: 51.3 kg (113 lb)   Height: 5' 2" (1.575 m)        General: alert and orient x 3, well-developed, well-nourished and in no apparent distress.    Vasc: Palpable pedal pulses, feet appropriately warm to touch. Capillary refill time within normal limits.   Neuro: Epicritic sensation intact.  No focal deficits. No Tinels.   MSK: bilateral mild bunion deformity, minimal pes planus, arch still maintained weight bearing. Minimal equinus bilateral   Derm:  Multiple Nucleated porokeratosis, bilateral sub 2nd met head.  Tender to palpation.  No other open lesions, macerations, or rashes noted.   No redness or swelling           Assessment / Plan:    Problem List Items Addressed This Visit     None      Visit Diagnoses     Callus of foot        Relevant Medications    urea 10 % Lotn          I counseled the patient on her conditions, their implications and medical management.     Callus of foot  -     urea 10 % Lotn; Apply 1 application topically once daily. To dry skin on the feet.  Dispense: 177 mL; Refill: 6    - continue custom foot orthotics or consider Spenco orthotic Arch (OTC)  - calluses trimmed as a courtesy  - never walk barefoot  - moisturize feet daily    Call or return to clinic prn if these symptoms worsen or fail to improve as anticipated.    "

## 2018-12-03 NOTE — PROGRESS NOTES
SUBJECTIVE:   48 y.o. female   for routine gyn exam. Patient's last menstrual period was 2018 (exact date)..  She has no unusual complaints.  Filed for divorce from .  Coping well.  Desires STD screen.          Past Medical History:   Diagnosis Date    Anemia     Arthritis     Carpal tunnel syndrome     Keratitis secondary to contact lens     OD    Other disorders of eyelid(374.89)     MGD    Spondylolisthesis      Past Surgical History:   Procedure Laterality Date    ADENOIDECTOMY      NOSE SURGERY      Age 18-cosmetic    TONSILLECTOMY       Social History     Socioeconomic History    Marital status:      Spouse name: Not on file    Number of children: 2    Years of education: Not on file    Highest education level: Not on file   Social Needs    Financial resource strain: Not on file    Food insecurity - worry: Not on file    Food insecurity - inability: Not on file    Transportation needs - medical: Not on file    Transportation needs - non-medical: Not on file   Occupational History     Employer: not employed   Tobacco Use    Smoking status: Former Smoker     Packs/day: 0.30     Years: 10.00     Pack years: 3.00     Types: Cigarettes     Last attempt to quit: 2005     Years since quittin.1    Smokeless tobacco: Never Used   Substance and Sexual Activity    Alcohol use: Yes     Comment: Rare    Drug use: No    Sexual activity: Yes     Partners: Male     Birth control/protection: None   Other Topics Concern    Not on file   Social History Narrative    Not on file     Family History   Problem Relation Age of Onset    Depression Mother     Mental illness Mother     Hypertension Mother     Hypertension Father     Cancer Sister     Asthma Brother     Mental illness Brother     Asthma Son     Amblyopia Neg Hx     Blindness Neg Hx     Cataracts Neg Hx     Diabetes Neg Hx     Glaucoma Neg Hx     Macular degeneration Neg Hx     Retinal detachment  "Neg Hx     Strabismus Neg Hx     Stroke Neg Hx     Thyroid disease Neg Hx     Breast cancer Neg Hx      OB History    Para Term  AB Living   1 1 1 0   1   SAB TAB Ectopic Multiple Live Births           1      # Outcome Date GA Lbr Khurram/2nd Weight Sex Delivery Anes PTL Lv   1 Term /    M Vag-Spont   ABUNDIO            Current Outpatient Medications   Medication Sig Dispense Refill    urea 10 % Lotn Apply 1 application topically once daily. To dry skin on the feet. 177 mL 6     No current facility-administered medications for this visit.      Allergies: No known allergies     ROS:  Constitutional: no weight loss, weight gain, fever, fatigue  Eyes:  No vision changes, glasses/contacts  ENT/Mouth: No ulcers, sinus problems, ears ringing, headache  Cardiovascular: No inability to lie flat, chest pain, exercise intolerance, swelling, heart palpitations  Respiratory: No wheezing, coughing blood, shortness of breath, or cough  Gastrointestinal: No diarrhea, bloody stool, nausea/vomiting, constipation, gas, hemorrhoids  Genitourinary: No blood in urine, painful urination, urgency of urination, frequency of urination, incomplete emptying, incontinence, abnormal bleeding, painful periods, heavy periods, vaginal discharge, vaginal odor, painful intercourse, sexual problems, bleeding after intercourse.  Musculoskeletal: No muscle weakness  Skin/Breast: No painful breasts, nipple discharge, masses, rash, ulcers  Neurological: No passing out, seizures, numbness, headache  Endocrine: No diabetes, hypothyroid, hyperthyroid, hot flashes, hair loss, abnormal hair growth, ance  Psychiatric: No depression, crying  Hematologic: No bruises, bleeding, swollen lymph nodes, anemia.      OBJECTIVE:   The patient appears well, alert, oriented x 3, in no distress.  BP 96/60 (BP Location: Right arm, Patient Position: Sitting, BP Method: Small (Manual))   Ht 5' 2" (1.575 m)   Wt 51.7 kg (113 lb 15.7 oz)   LMP 2018 " (Exact Date)   BMI 20.85 kg/m²   NECK: no thyromegaly, trachea midline  SKIN: no acne, striae, hirsutism  CHEST: CTAB  CV: RRR  BREAST EXAM: breasts appear normal, no suspicious masses, no skin or nipple changes or axillary nodes  ABDOMEN: no hernias, masses, or hepatosplenomegaly  GENITALIA: normal external genitalia, no erythema, no discharge  URETHRA: normal urethra, normal urethral meatus  VAGINA: Normal  CERVIX: no lesions or cervical motion tenderness  UTERUS: normal size, contour, position, consistency, mobility, non-tender  ADNEXA: no mass, fullness, tenderness      ASSESSMENT:   1. Well female exam with routine gynecological exam  Liquid-based pap smear, screening   2. Screening for venereal disease  C. trachomatis/N. gonorrhoeae by AMP DNA [facility]; Cervix    Vaginosis Screen by DNA Probe       PLAN:   Orders Placed This Encounter    C. trachomatis/N. gonorrhoeae by AMP DNA [facility]; Cervix    Vaginosis Screen by DNA Probe    Liquid-based pap smear, screening     Return to clinic in 1 year

## 2018-12-06 ENCOUNTER — OFFICE VISIT (OUTPATIENT)
Dept: ORTHOPEDICS | Facility: CLINIC | Age: 48
End: 2018-12-06
Payer: COMMERCIAL

## 2018-12-06 VITALS — HEIGHT: 62 IN | WEIGHT: 113 LBS | BODY MASS INDEX: 20.8 KG/M2

## 2018-12-06 DIAGNOSIS — L03.011 PARONYCHIA OF FINGER, RIGHT: Primary | ICD-10-CM

## 2018-12-06 PROCEDURE — 3008F BODY MASS INDEX DOCD: CPT | Mod: CPTII,S$GLB,, | Performed by: ORTHOPAEDIC SURGERY

## 2018-12-06 PROCEDURE — 99999 PR PBB SHADOW E&M-EST. PATIENT-LVL II: CPT | Mod: PBBFAC,,, | Performed by: ORTHOPAEDIC SURGERY

## 2018-12-06 PROCEDURE — 99213 OFFICE O/P EST LOW 20 MIN: CPT | Mod: S$GLB,,, | Performed by: ORTHOPAEDIC SURGERY

## 2018-12-06 NOTE — PROGRESS NOTES
HISTORY OF PRESENT ILLNESS:  Ms. Theodore in followup, I and D of felon infection   of the right small finger.  She is about 1 month out from the procedure done in   the office.  She finished up the antibiotics, still having a little bit of   soreness in the finger.  She was a little bit concerned about that.    PHYSICAL EXAMINATION:  RIGHT SMALL FINGER:  The previous surgical incision is well healed.  Slight   amount of redness.  Really minimal tenderness.  No fluctuance.  No drainage.    The nail itself looks good.  Range of motion is full.    PLAN:  I think this is just inflammation.  No evidence of residual infection   that I can see.  I have given her some Pennsaid topical antiinflammatory cream,   which I would like her to use for the next few weeks 2 or 3 times a day, but I   do want to see her back in about 3 weeks.      CHICHI/IN  dd: 12/06/2018 08:46:47 (CST)  td: 12/06/2018 21:10:02 (CST)  Doc ID   #[id number]  Job ID #[id number]    CC:

## 2019-02-21 ENCOUNTER — PATIENT MESSAGE (OUTPATIENT)
Dept: PODIATRY | Facility: CLINIC | Age: 49
End: 2019-02-21

## 2019-03-12 ENCOUNTER — LAB VISIT (OUTPATIENT)
Dept: LAB | Facility: HOSPITAL | Age: 49
End: 2019-03-12
Payer: COMMERCIAL

## 2019-03-12 ENCOUNTER — OFFICE VISIT (OUTPATIENT)
Dept: INTERNAL MEDICINE | Facility: CLINIC | Age: 49
End: 2019-03-12
Payer: COMMERCIAL

## 2019-03-12 VITALS
HEIGHT: 62 IN | WEIGHT: 112.44 LBS | TEMPERATURE: 99 F | SYSTOLIC BLOOD PRESSURE: 120 MMHG | HEART RATE: 99 BPM | BODY MASS INDEX: 20.69 KG/M2 | DIASTOLIC BLOOD PRESSURE: 74 MMHG

## 2019-03-12 DIAGNOSIS — M54.42 LEFT-SIDED LOW BACK PAIN WITH LEFT-SIDED SCIATICA, UNSPECIFIED CHRONICITY: Primary | ICD-10-CM

## 2019-03-12 DIAGNOSIS — R10.9 LEFT SIDED ABDOMINAL PAIN: ICD-10-CM

## 2019-03-12 DIAGNOSIS — F43.20 ADJUSTMENT DISORDER, UNSPECIFIED TYPE: ICD-10-CM

## 2019-03-12 LAB
ALBUMIN SERPL BCP-MCNC: 4.3 G/DL
ALP SERPL-CCNC: 84 U/L
ALT SERPL W/O P-5'-P-CCNC: 66 U/L
ANION GAP SERPL CALC-SCNC: 11 MMOL/L
AST SERPL-CCNC: 24 U/L
B-HCG UR QL: NEGATIVE
BASOPHILS # BLD AUTO: 0.03 K/UL
BASOPHILS NFR BLD: 0.3 %
BILIRUB SERPL-MCNC: 0.3 MG/DL
BILIRUB SERPL-MCNC: ABNORMAL MG/DL
BILIRUB UR QL STRIP: NEGATIVE
BLOOD URINE, POC: ABNORMAL
BUN SERPL-MCNC: 10 MG/DL
CALCIUM SERPL-MCNC: 10 MG/DL
CHLORIDE SERPL-SCNC: 102 MMOL/L
CLARITY UR REFRACT.AUTO: CLEAR
CO2 SERPL-SCNC: 28 MMOL/L
COLOR UR AUTO: ABNORMAL
COLOR, POC UA: YELLOW
CREAT SERPL-MCNC: 0.8 MG/DL
CTP QC/QA: YES
DIFFERENTIAL METHOD: NORMAL
EOSINOPHIL # BLD AUTO: 0.2 K/UL
EOSINOPHIL NFR BLD: 2.2 %
ERYTHROCYTE [DISTWIDTH] IN BLOOD BY AUTOMATED COUNT: 13 %
EST. GFR  (AFRICAN AMERICAN): >60 ML/MIN/1.73 M^2
EST. GFR  (NON AFRICAN AMERICAN): >60 ML/MIN/1.73 M^2
GLUCOSE SERPL-MCNC: 107 MG/DL
GLUCOSE UR QL STRIP: NEGATIVE
GLUCOSE UR QL STRIP: NORMAL
HCT VFR BLD AUTO: 41 %
HGB BLD-MCNC: 13.1 G/DL
HGB UR QL STRIP: ABNORMAL
KETONES UR QL STRIP: ABNORMAL
KETONES UR QL STRIP: NEGATIVE
LEUKOCYTE ESTERASE UR QL STRIP: NEGATIVE
LEUKOCYTE ESTERASE URINE, POC: ABNORMAL
LYMPHOCYTES # BLD AUTO: 1.6 K/UL
LYMPHOCYTES NFR BLD: 18.5 %
MCH RBC QN AUTO: 28.5 PG
MCHC RBC AUTO-ENTMCNC: 32 G/DL
MCV RBC AUTO: 89 FL
MICROSCOPIC COMMENT: NORMAL
MONOCYTES # BLD AUTO: 0.6 K/UL
MONOCYTES NFR BLD: 6.9 %
NEUTROPHILS # BLD AUTO: 6.3 K/UL
NEUTROPHILS NFR BLD: 72 %
NITRITE UR QL STRIP: NEGATIVE
NITRITE, POC UA: ABNORMAL
PH UR STRIP: 7 [PH] (ref 5–8)
PH, POC UA: 6
PLATELET # BLD AUTO: 244 K/UL
PMV BLD AUTO: 11.9 FL
POTASSIUM SERPL-SCNC: 3.6 MMOL/L
PROT SERPL-MCNC: 8 G/DL
PROT UR QL STRIP: NEGATIVE
PROTEIN, POC: ABNORMAL
RBC # BLD AUTO: 4.59 M/UL
RBC #/AREA URNS AUTO: 4 /HPF (ref 0–4)
SODIUM SERPL-SCNC: 141 MMOL/L
SP GR UR STRIP: 1 (ref 1–1.03)
SPECIFIC GRAVITY, POC UA: 1005
SQUAMOUS #/AREA URNS AUTO: 1 /HPF
URN SPEC COLLECT METH UR: ABNORMAL
UROBILINOGEN, POC UA: NORMAL
WBC # BLD AUTO: 8.72 K/UL
WBC #/AREA URNS AUTO: 0 /HPF (ref 0–5)

## 2019-03-12 PROCEDURE — 99214 OFFICE O/P EST MOD 30 MIN: CPT | Mod: 25,S$GLB,, | Performed by: PHYSICIAN ASSISTANT

## 2019-03-12 PROCEDURE — 99999 PR PBB SHADOW E&M-EST. PATIENT-LVL V: ICD-10-PCS | Mod: PBBFAC,,, | Performed by: PHYSICIAN ASSISTANT

## 2019-03-12 PROCEDURE — 81025 POCT URINE PREGNANCY: ICD-10-PCS | Mod: S$GLB,,, | Performed by: PHYSICIAN ASSISTANT

## 2019-03-12 PROCEDURE — 99999 PR PBB SHADOW E&M-EST. PATIENT-LVL V: CPT | Mod: PBBFAC,,, | Performed by: PHYSICIAN ASSISTANT

## 2019-03-12 PROCEDURE — 36415 COLL VENOUS BLD VENIPUNCTURE: CPT

## 2019-03-12 PROCEDURE — 81025 URINE PREGNANCY TEST: CPT | Mod: S$GLB,,, | Performed by: PHYSICIAN ASSISTANT

## 2019-03-12 PROCEDURE — 87086 URINE CULTURE/COLONY COUNT: CPT

## 2019-03-12 PROCEDURE — 81001 URINALYSIS AUTO W/SCOPE: CPT | Mod: S$GLB,,, | Performed by: PHYSICIAN ASSISTANT

## 2019-03-12 PROCEDURE — 81001 URINALYSIS AUTO W/SCOPE: CPT

## 2019-03-12 PROCEDURE — 81001 POCT URINALYSIS, DIPSTICK OR TABLET REAGENT, AUTOMATED, WITH MICROSCOP: ICD-10-PCS | Mod: S$GLB,,, | Performed by: PHYSICIAN ASSISTANT

## 2019-03-12 PROCEDURE — 80053 COMPREHEN METABOLIC PANEL: CPT

## 2019-03-12 PROCEDURE — 3008F PR BODY MASS INDEX (BMI) DOCUMENTED: ICD-10-PCS | Mod: CPTII,S$GLB,, | Performed by: PHYSICIAN ASSISTANT

## 2019-03-12 PROCEDURE — 3008F BODY MASS INDEX DOCD: CPT | Mod: CPTII,S$GLB,, | Performed by: PHYSICIAN ASSISTANT

## 2019-03-12 PROCEDURE — 99214 PR OFFICE/OUTPT VISIT, EST, LEVL IV, 30-39 MIN: ICD-10-PCS | Mod: 25,S$GLB,, | Performed by: PHYSICIAN ASSISTANT

## 2019-03-12 PROCEDURE — 85025 COMPLETE CBC W/AUTO DIFF WBC: CPT

## 2019-03-12 RX ORDER — SERTRALINE HYDROCHLORIDE 100 MG/1
TABLET, FILM COATED ORAL
Refills: 0 | COMMUNITY
Start: 2019-02-21 | End: 2019-04-10 | Stop reason: CLARIF

## 2019-03-12 RX ORDER — HYDROXYZINE PAMOATE 25 MG/1
CAPSULE ORAL
Refills: 0 | COMMUNITY
Start: 2019-02-21 | End: 2019-06-18

## 2019-03-12 NOTE — PROGRESS NOTES
Subjective:       Patient ID: Deja Theodore is a 48 y.o. female.        Chief Complaint: Low-back Pain (L pain=10) and Leg Pain (L )    Deja Theodore is an established patient of Fransisco Sepulveda MD here today for urgent care visit.    Left sided low back pain:  Chronic back pain secondary to falling off a horse years ago.  Started 4 days ago.  Constant pain unless she is lying down.  Radiates into the left thigh, mainly posterior.  It does not radiate past the knee.  Described as a dull and burning pain.  Urinating or having a bowel movement increases the back and thigh pain.  No injury but she did have a massage the day before with a lot of manipulation to the back and neck.  No numbness/tingling/weakness.  No urinary or fecal incontinence.    No N/V.  No diarrhea or constipation.  Tried her back stretches and applied an OTC pain patch, but no help.  She also has some left sided abdominal pain.  No fever.  LMP 2/27/19 and ended 3/4/19, now with spotting.  Not sexually active currently.  No vaginal discharge.    Stress/adjustment reaction:  Seeing a psychiatrist.   Going through a divorce.  On zoloft and hydroxyzine.    Had a headache Saturday but that is resolved.          Back Pain   This is a chronic problem. The current episode started in the past 7 days. The problem occurs constantly. The problem is unchanged. The pain is present in the sacro-iliac. The quality of the pain is described as aching, burning and cramping. The pain radiates to the left thigh. The pain is at a severity of 10/10. The pain is severe. The pain is the same all the time. The symptoms are aggravated by sitting, standing, stress and urinating. Stiffness is present in the morning. Associated symptoms include abdominal pain, headaches (resolved) and leg pain. Pertinent negatives include no bladder incontinence, bowel incontinence, chest pain, dysuria, fever, numbness, paresis, paresthesias, pelvic pain, perianal numbness, tingling,  weakness or weight loss. Risk factors include pregnancy. She has tried bed rest and home exercises for the symptoms. The treatment provided no relief.      Review of Systems   Constitutional: Negative for chills, diaphoresis, fatigue, fever and weight loss.   HENT: Negative for congestion and sore throat.    Eyes: Negative for visual disturbance.   Respiratory: Negative for cough, chest tightness and shortness of breath.    Cardiovascular: Negative for chest pain, palpitations and leg swelling.   Gastrointestinal: Positive for abdominal pain. Negative for blood in stool, bowel incontinence, constipation, diarrhea, nausea and vomiting.   Genitourinary: Negative for bladder incontinence, dysuria, frequency, hematuria, pelvic pain and urgency.   Musculoskeletal: Positive for back pain. Negative for arthralgias.   Skin: Negative for rash.   Neurological: Positive for headaches (resolved). Negative for dizziness, tingling, syncope, weakness, numbness and paresthesias.   Psychiatric/Behavioral: Negative for dysphoric mood and sleep disturbance. The patient is not nervous/anxious.        Objective:      Physical Exam   Constitutional: She appears well-developed and well-nourished.   HENT:   Head: Normocephalic.   Right Ear: External ear normal.   Left Ear: External ear normal.   Mouth/Throat: Oropharynx is clear and moist.   Eyes: Pupils are equal, round, and reactive to light.   Cardiovascular: Normal rate, regular rhythm and normal heart sounds. Exam reveals no gallop and no friction rub.   No murmur heard.  Pulmonary/Chest: Effort normal and breath sounds normal. No respiratory distress.   Abdominal: Soft. Normal appearance. There is tenderness in the left upper quadrant and left lower quadrant.   Musculoskeletal: She exhibits no edema.        Lumbar back: She exhibits normal range of motion, no tenderness and no spasm.   Neurological: She is alert. She has normal strength. No sensory deficit.   Reflex Scores:        "Patellar reflexes are 2+ on the right side and 2+ on the left side.       Achilles reflexes are 2+ on the right side and 2+ on the left side.  Pain with flexion of the back and with SLR on left   Skin: Skin is warm and dry.   Psychiatric: She has a normal mood and affect.   Nursing note and vitals reviewed.      Assessment:       1. Left-sided low back pain with left-sided sciatica, unspecified chronicity    2. Left sided abdominal pain    3. Adjustment disorder, unspecified type        Plan:       Deja was seen today for low-back pain and leg pain.    Diagnoses and all orders for this visit:    Left-sided low back pain with left-sided sciatica, unspecified chronicity  -     Urinalysis  -     Urine culture  -     POCT urinalysis, dipstick or tablet reag  -     POCT urine pregnancy: negative  -     MRI Lumbar Spine Without Contrast; Future    Left sided abdominal pain  -     POCT urine pregnancy: negative  -     CBC auto differential; Future  -     Comprehensive metabolic panel; Future  -     CT Abdomen Pelvis With Contrast; Future  -     Ambulatory referral to Gynecology    Adjustment disorder, unspecified type: continue zoloft and hydroxyzine    Appreciate Dr. Amato's assistance in evaluating this patient.    Pt has been given instructions populated from [employer] database and has verbalized understanding of the after visit summary and information contained wherein.    Follow up with a primary care provider. May go to ER for acute shortness of breath, lightheadedness, fever, or any other emergent complaints or changes in condition.    "This note will be shared with the patient"    Future Appointments   Date Time Provider Department Center   3/13/2019  9:30 AM [facility] CT OP LIMIT 450 LBS [facility] CTSCANO [facility]   3/15/2019  9:15 AM [facility] OIC-MRI2 [facility] MRI [facility]   3/19/2019  1:20 PM Fransisco Amato MD [facility] IM [address] PCW   3/20/2019 10:15 AM Peggy Carrero MD [facility] [place] [id number] [facility]         "       Answers for HPI/ROS submitted by the patient on 3/11/2019   Back pain  genital pain: No

## 2019-03-12 NOTE — PATIENT INSTRUCTIONS
Abdominal Pain    Abdominal pain is pain in the stomach or belly area. Everyone has this pain from time to time. In many cases it goes away on its own. But abdominal pain can sometimes be due to a serious problem, such as appendicitis. So its important to know when to seek help.  Causes of abdominal pain  There are many possible causes of abdominal pain. Common causes in adults include:  · Constipation, diarrhea, or gas  · Stomach acid flowing back up into the esophagus (acid reflux or heartburn)  · Severe acid reflux, called GERD (gastroesophageal reflux disease)  · A sore in the lining of the stomach or small intestine (peptic ulcer)  · Inflammation of the gallbladder, liver, or pancreas  · Gallstones or kidney stones  · Appendicitis   · Intestinal blockage   · An internal organ pushing through a muscle or other tissue (hernia)  · Urinary tract infections  · In women, menstrual cramps, fibroids, or endometriosis  · Inflammation or infection of the intestines  Diagnosing the cause of abdominal pain  Your healthcare provider will do a physical exam help find the cause of your pain. If needed, tests will be ordered. Belly pain has many possible causes. So it can be hard to find the reason for your pain. Giving details about your pain can help. Tell your provider where and when you feel the pain, and what makes it better or worse. Also let your provider know if you have other symptoms such as:  · Fever  · Tiredness  · Upset stomach (nausea)  · Vomiting  · Changes in bathroom habits  Treating abdominal pain  Some causes of pain need emergency medical treatment right away. These include appendicitis or a bowel blockage. Other problems can be treated with rest, fluids, or medicines. Your healthcare provider can give you specific instructions for treatment or self-care based on what is causing your pain.  If you have vomiting or diarrhea, sip water or other clear fluids. When you are ready to eat solid foods again,  start with small amounts of easy-to-digest, low-fat foods. These include apple sauce, toast, or crackers.   When to seek medical care  Call 911 or go to the hospital right away if you:  · Cant pass stool and are vomiting  · Are vomiting blood or have bloody diarrhea or black, tarry diarrhea  · Have chest, neck, or shoulder pain  · Feel like you might pass out  · Have pain in your shoulder blades with nausea  · Have sudden, severe belly pain  · Have new, severe pain unlike any you have felt before  · Have a belly that is rigid, hard, and tender to touch  Call your healthcare provider if you have:  · Pain for more than 5 days  · Bloating for more than 2 days  · Diarrhea for more than 5 days  · A fever of 100.4°F (38.0°C) or higher, or as directed by your provider  · Pain that gets worse  · Weight loss for no reason  · Continued lack of appetite  · Blood in your stool  How to prevent abdominal pain  Here are some tips to help prevent abdominal pain:  · Eat smaller amounts of food at one time.  · Avoid greasy, fried, or other high-fat foods.  · Avoid foods that give you gas.  · Exercise regularly.  · Drink plenty of fluids.  To help prevent GERD symptoms:  · Quit smoking.  · Reduce alcohol and certain foods that increase stomach acid.  · Avoid aspirin and over-the-counter pain and fever medicines (NSAIDS or nonsteroidal anti-inflammatory drugs), if possible  · Lose extra weight.  · Finish eating at least 2 hours before you go to bed or lie down.  · Raise the head of your bed.  Date Last Reviewed: 7/1/2016  © 7780-1160 [employer]. [address] [place] [address], [place] [address]. All rights reserved. This information is not intended as a substitute for professional medical care. Always follow your healthcare professional's instructions.        General Neck and Back Pain    Both neck and back pain are usually caused by injury to the muscles or ligaments of the spine. Sometimes the disks that separate each bone of  the spine may cause pain by pressing on a nearby nerve. Back and neck pain may appear after a sudden twisting or bending force (such as in a car accident), or sometimes after a simple awkward movement. In either case, muscle spasm is often present and adds to the pain.  Acute neck and back pain usually gets better in 1 to 2 weeks. Pain related to disk disease, arthritis in the spinal joints or spinal stenosis (narrowing of the spinal canal) can become chronic and last for months or years.  Back and neck pain are common problems. Most people feel better in 1 or 2 weeks, and most of the rest in 1 to 2 months. Most people can remain active.  People experience and describe pain differently.  · Pain can be sharp, stabbing, shooting, aching, cramping, or burning  · Movement, standing, bending, lifting, sitting, or walking may worsen the pain  · Pain can be localized to one spot or area, or it can be more generalized  · Pain can spread or radiate upwards, downwards, to the front, or go down your arms  · Muscle spasm may occur.  Most of the time mechanical problems with the muscles or spine cause the pain. it is usually caused by an injury, whether known or not, to the muscles or ligaments. While illnesses can cause back pain, it is usually not caused by a serious illness. Pain is usually related to physical activity, whether sports, exercise, work, or normal activity. Sometimes it can occur without an identifiable cause. This can happen simply by stretching or moving wrong, without noting pain at the time. Other causes include:  · Overexertion, lifting, pushing, pulling incorrectly or too aggressively.  · Sudden twisting, bending or stretching from an accident (car or fall), or accidental movement.  · Poor posture  · Poor conditioning, lack of regular exercise  · Spinal disc disease or arthritis  · Stress  · Pregnancy, or illness like appendicitis, bladder or kidney infection, pelvic infections   Home care  · For neck  pain: Use a comfortable pillow that supports the head and keeps the spine in a neutral position. The position of the head should not be tilted forward or backward.  · When in bed, try to find a position of comfort. A firm mattress is best. Try lying flat on your back with pillows under your knees. You can also try lying on your side with your knees bent up towards your chest and a pillow between your knees.  · At first, do not try to stretch out the sore spots. If there is a strain, it is not like the good soreness you get after exercising without an injury. In this case, stretching may make it worse.  · Avoid prolonged sitting, long car rides or travel. This puts more stress on the lower back than standing or walking.  · During the first 24 to 72 hours after an injury, apply an ice pack to the painful area for 20 minutes and then remove it for 20 minutes over a period of 60 to 90 minutes or several times a day.   · You can alternate ice and heat therapies. Talk with your healthcare provider about the best treatment for your back or neck pain. As a safety precaution, do not use a heating pad at bedtime. Sleeping with a heating pad can lead to skin burns or tissue damage.  · Therapeutic massage can help relax the back and neck muscles without stretching them.  · Be aware of safe lifting methods and do not lift anything over 15 pounds until all the pain is gone.  Medications  Talk to your healthcare provider before using medicine, especially if you have other medical problems or are taking other medicines.  · You may use over-the-counter medicine to control pain, unless another pain medicine was prescribed. If you have chronic conditions like diabetes, liver or kidney disease, stomach ulcers,  gastrointestinal bleeding, or are taking blood thinner medicines.  · Be careful if you are given pain medicines, narcotics, or medicine for muscle spasm. They can cause drowsiness, and can affect your coordination, reflexes, and  judgment. Do not drive or operate heavy machinery.  Follow-up care  Follow up with your healthcare provider, or as advised. Physical therapy or further tests may be needed.  If X-rays were taken, you will be notified of any new findings that may affect your care.  Call 911  Seek emergency medical care if any of the following occur:  · Trouble breathing  · Confusion  · Very drowsy or trouble awakening  · Fainting or loss of consciousness  · Rapid or very slow heart rate  · Loss of bowel or bladder control  When to seek medical advice  Call your healthcare provider right away if any of these occur:  · Pain becomes worse or spreads into your arms or legs  · Weakness, numbness or pain in one or both arms or legs  · Numbness in the groin area  · Difficulty walking  · Fever of 100.4ºF (38ºC) or higher, or as directed by your healthcare provider  Date Last Reviewed: 7/1/2016  © 0439-3622 [employer]. [address] [place] [address], [place] [address]. All rights reserved. This information is not intended as a substitute for professional medical care. Always follow your healthcare professional's instructions.

## 2019-03-13 ENCOUNTER — PATIENT MESSAGE (OUTPATIENT)
Dept: INTERNAL MEDICINE | Facility: CLINIC | Age: 49
End: 2019-03-13

## 2019-03-13 ENCOUNTER — HOSPITAL ENCOUNTER (OUTPATIENT)
Dept: RADIOLOGY | Facility: OTHER | Age: 49
Discharge: HOME | End: 2019-03-13
Attending: OBSTETRICS & GYNECOLOGY
Payer: COMMERCIAL

## 2019-03-13 ENCOUNTER — TELEPHONE (OUTPATIENT)
Dept: OBSTETRICS AND GYNECOLOGY | Facility: CLINIC | Age: 49
End: 2019-03-13

## 2019-03-13 ENCOUNTER — TELEPHONE (OUTPATIENT)
Dept: INTERNAL MEDICINE | Facility: CLINIC | Age: 49
End: 2019-03-13

## 2019-03-13 ENCOUNTER — HOSPITAL ENCOUNTER (OUTPATIENT)
Dept: RADIOLOGY | Facility: OTHER | Age: 49
Discharge: HOME | End: 2019-03-13
Attending: PHYSICIAN ASSISTANT
Payer: COMMERCIAL

## 2019-03-13 DIAGNOSIS — R10.9 LEFT SIDED ABDOMINAL PAIN: ICD-10-CM

## 2019-03-13 DIAGNOSIS — N83.202 CYST OF LEFT OVARY: Primary | ICD-10-CM

## 2019-03-13 DIAGNOSIS — N83.202 CYST OF LEFT OVARY: ICD-10-CM

## 2019-03-13 LAB — BACTERIA UR CULT: NORMAL

## 2019-03-13 PROCEDURE — 76830 US PELVIS COMP WITH TRANSVAG NON-OB (XPD): ICD-10-PCS | Mod: 26,,, | Performed by: RADIOLOGY

## 2019-03-13 PROCEDURE — 76830 TRANSVAGINAL US NON-OB: CPT | Mod: TC

## 2019-03-13 PROCEDURE — 76830 TRANSVAGINAL US NON-OB: CPT | Mod: 26,,, | Performed by: RADIOLOGY

## 2019-03-13 PROCEDURE — 76856 US EXAM PELVIC COMPLETE: CPT | Mod: 26,,, | Performed by: RADIOLOGY

## 2019-03-13 PROCEDURE — 74177 CT ABDOMEN PELVIS WITH CONTRAST: ICD-10-PCS | Mod: 26,,, | Performed by: RADIOLOGY

## 2019-03-13 PROCEDURE — 25500020 PHARM REV CODE 255: Performed by: PHYSICIAN ASSISTANT

## 2019-03-13 PROCEDURE — 74177 CT ABD & PELVIS W/CONTRAST: CPT | Mod: TC

## 2019-03-13 PROCEDURE — 76856 US PELVIS COMP WITH TRANSVAG NON-OB (XPD): ICD-10-PCS | Mod: 26,,, | Performed by: RADIOLOGY

## 2019-03-13 PROCEDURE — 74177 CT ABD & PELVIS W/CONTRAST: CPT | Mod: 26,,, | Performed by: RADIOLOGY

## 2019-03-13 RX ADMIN — IOHEXOL 30 ML: 350 INJECTION, SOLUTION INTRAVENOUS at 08:03

## 2019-03-13 RX ADMIN — IOHEXOL 75 ML: 350 INJECTION, SOLUTION INTRAVENOUS at 09:03

## 2019-03-13 NOTE — TELEPHONE ENCOUNTER
----- Message from Danielle Glass PA-C sent at 3/13/2019 10:46 AM CDT -----  Dr. Carrero,    I saw Ms. Theodore yesterday for left sided abdominal pain.  CT shows a hemorrhagic cyst in the left adnexa.  She is scheduled to see you next week.  Please let me know if you want to see her sooner or if next week is okay.  Thank you so much!    Best,  Danielle Glass PA-C  [facility]  [address] [place] [id number]  P: [phone]  F: [phone]

## 2019-03-13 NOTE — TELEPHONE ENCOUNTER
Scheduled ultrasound today at 2:15 pm at the [facility]- follow up appointment scheduled 03/14/2019 to discuss results. Ultrasound instructions, location and time discussed.

## 2019-03-13 NOTE — TELEPHONE ENCOUNTER
Needs pelvic u/s and appt afterwards.   I can see her today, tomorrow, Friday- whichever day is best for her.

## 2019-03-13 NOTE — TELEPHONE ENCOUNTER
----- Message from Fransisco Amato MD sent at 3/13/2019 12:59 PM CDT -----  Thank you - I'll cc staff and schedule for a follow up in a few weeks to discuss the ALT and CT (fatty liver) finding. BILL Falcon, Please call patient and schedule patient for an appointment with me in 2-3 weeks. Thank you.        ----- Message -----  From: Danielle Glass PA-C  Sent: 3/13/2019   6:46 AM  To: MD Dr. Lima Gao,    CBC normal.  ALT mildly elevated.    Best,  Danielle Glass PA-C  [facility]  [address] [place] [id number]  P: [phone]  F: [phone]

## 2019-03-14 ENCOUNTER — TELEPHONE (OUTPATIENT)
Dept: OBSTETRICS AND GYNECOLOGY | Facility: CLINIC | Age: 49
End: 2019-03-14

## 2019-03-14 ENCOUNTER — OFFICE VISIT (OUTPATIENT)
Dept: OBSTETRICS AND GYNECOLOGY | Facility: CLINIC | Age: 49
End: 2019-03-14
Attending: OBSTETRICS & GYNECOLOGY
Payer: COMMERCIAL

## 2019-03-14 VITALS
BODY MASS INDEX: 22.76 KG/M2 | SYSTOLIC BLOOD PRESSURE: 110 MMHG | WEIGHT: 112.88 LBS | DIASTOLIC BLOOD PRESSURE: 70 MMHG | HEIGHT: 59 IN

## 2019-03-14 DIAGNOSIS — N83.202 LEFT OVARIAN CYST: Primary | ICD-10-CM

## 2019-03-14 DIAGNOSIS — R10.2 PELVIC PAIN: Primary | ICD-10-CM

## 2019-03-14 DIAGNOSIS — N83.209 CYST OF OVARY, UNSPECIFIED LATERALITY: ICD-10-CM

## 2019-03-14 DIAGNOSIS — R10.2 FEMALE PELVIC PAIN: ICD-10-CM

## 2019-03-14 PROCEDURE — 99214 OFFICE O/P EST MOD 30 MIN: CPT | Mod: 57,S$GLB,, | Performed by: OBSTETRICS & GYNECOLOGY

## 2019-03-14 PROCEDURE — 99999 PR PBB SHADOW E&M-EST. PATIENT-LVL III: ICD-10-PCS | Mod: PBBFAC,,, | Performed by: OBSTETRICS & GYNECOLOGY

## 2019-03-14 PROCEDURE — 3008F BODY MASS INDEX DOCD: CPT | Mod: CPTII,S$GLB,, | Performed by: OBSTETRICS & GYNECOLOGY

## 2019-03-14 PROCEDURE — 3008F PR BODY MASS INDEX (BMI) DOCUMENTED: ICD-10-PCS | Mod: CPTII,S$GLB,, | Performed by: OBSTETRICS & GYNECOLOGY

## 2019-03-14 PROCEDURE — 99999 PR PBB SHADOW E&M-EST. PATIENT-LVL III: CPT | Mod: PBBFAC,,, | Performed by: OBSTETRICS & GYNECOLOGY

## 2019-03-14 PROCEDURE — 99214 PR OFFICE/OUTPT VISIT, EST, LEVL IV, 30-39 MIN: ICD-10-PCS | Mod: 57,S$GLB,, | Performed by: OBSTETRICS & GYNECOLOGY

## 2019-03-15 ENCOUNTER — HOSPITAL ENCOUNTER (OUTPATIENT)
Dept: PREADMISSION TESTING | Facility: OTHER | Age: 49
Discharge: HOME | End: 2019-03-15
Attending: OBSTETRICS & GYNECOLOGY
Payer: COMMERCIAL

## 2019-03-15 ENCOUNTER — PATIENT MESSAGE (OUTPATIENT)
Dept: OBSTETRICS AND GYNECOLOGY | Facility: CLINIC | Age: 49
End: 2019-03-15

## 2019-03-15 ENCOUNTER — ANESTHESIA EVENT (OUTPATIENT)
Dept: SURGERY | Facility: OTHER | Age: 49
End: 2019-03-15
Payer: COMMERCIAL

## 2019-03-15 VITALS
DIASTOLIC BLOOD PRESSURE: 59 MMHG | HEIGHT: 62 IN | RESPIRATION RATE: 18 BRPM | WEIGHT: 112 LBS | OXYGEN SATURATION: 100 % | SYSTOLIC BLOOD PRESSURE: 130 MMHG | HEART RATE: 91 BPM | TEMPERATURE: 99 F | BODY MASS INDEX: 20.61 KG/M2

## 2019-03-15 PROBLEM — R10.2 FEMALE PELVIC PAIN: Status: ACTIVE | Noted: 2019-03-15

## 2019-03-15 RX ORDER — SODIUM CHLORIDE, SODIUM LACTATE, POTASSIUM CHLORIDE, CALCIUM CHLORIDE 600; 310; 30; 20 MG/100ML; MG/100ML; MG/100ML; MG/100ML
INJECTION, SOLUTION INTRAVENOUS CONTINUOUS
Status: CANCELLED | OUTPATIENT
Start: 2019-03-15

## 2019-03-15 RX ORDER — LIDOCAINE HYDROCHLORIDE 10 MG/ML
0.5 INJECTION, SOLUTION EPIDURAL; INFILTRATION; INTRACAUDAL; PERINEURAL ONCE
Status: CANCELLED | OUTPATIENT
Start: 2019-03-15 | End: 2019-03-15

## 2019-03-15 RX ORDER — FAMOTIDINE 20 MG/1
20 TABLET, FILM COATED ORAL
Status: CANCELLED | OUTPATIENT
Start: 2019-03-15 | End: 2019-03-15

## 2019-03-15 RX ORDER — MIDAZOLAM HYDROCHLORIDE 1 MG/ML
2 INJECTION INTRAMUSCULAR; INTRAVENOUS ONCE AS NEEDED
Status: DISCONTINUED | OUTPATIENT
Start: 2019-03-18 | End: 2019-03-16 | Stop reason: HOSPADM

## 2019-03-15 NOTE — DISCHARGE INSTRUCTIONS
PRE-ADMIT TESTING -  [phone]    [address]  [facility]          Your surgery has been scheduled at [facility]. We are pleased to have the opportunity to serve you. For Further Information please call [phone].    On the day of surgery please report to the Information Desk on the 1st floor.    · CONTACT YOUR PHYSICIAN'S OFFICE THE DAY PRIOR TO YOUR SURGERY TO OBTAIN YOUR ARRIVAL TIME.     · The evening before surgery do not eat anything after 9 p.m. ( this includes hard candy, chewing gum and mints).  You may only have GATORADE, POWERADE AND WATER  from 9 p.m. until you leave your home.   DO NOT DRINK ANY LIQUIDS ON THE WAY TO THE HOSPITAL.      SPECIAL MEDICATION INSTRUCTIONS: TAKE medications checked off by the Anesthesiologist on your Medication List.    Angiogram Patients: Take medications as instructed by your physician, including aspirin.     Surgery Patients:    If you take ASPIRIN - Your PHYSICIAN/SURGEON will need to inform you IF/OR when you need to stop taking aspirin prior to your surgery.     Do Not take any medications containing IBUPROFEN.  Do Not Wear any make-up or dark nail polish   (especially eye make-up) to surgery. If you come to surgery with makeup on you will be required to remove the makeup or nail polish.    Do not shave your surgical area at least 5 days prior to your surgery. The surgical prep will be performed at the hospital according to Infection Control regulations.    Leave all valuables at home.   Do Not wear any jewelry or watches, including any metal in body piercings. Jewelry must be removed prior to coming to the hospital.  There is a possibility that rings that are unable to be removed may be cut off if they are on the surgical extremity.    Contact Lens must be removed before surgery. Either do not wear the contact lens or bring a case and solution for storage.  Please bring a container for eyeglasses or dentures as required.  Bring  any paperwork your physician has provided, such as consent forms,  history and physicals, doctor's orders, etc.   Bring comfortable clothes that are loose fitting to wear upon discharge. Take into consideration the type of surgery being performed.  Maintain your diet as advised per your physician the day prior to surgery.      Adequate rest the night before surgery is advised.   Park in the Parking lot behind the hospital or in the [facility] across the street from the parking lot. Parking is complimentary.  If you will be discharged the same day as your procedure, please arrange for a responsible adult to drive you home or to accompany you if traveling by taxi.   YOU WILL NOT BE PERMITTED TO DRIVE OR TO LEAVE THE HOSPITAL ALONE AFTER SURGERY.   It is strongly recommended that you arrange for someone to remain with you for the first 24 hrs following your surgery.       Thank you for your cooperation.  The Staff of [facility].        Bathing Instructions                                                                 Please shower the evening before and morning of your procedure with    ANTIBACTERIAL SOAP. ( DIAL, etc )  Concentrate on the surgical area   for at least 3 minutes and rinse completely. Dry off as usual.   Do not use any deodorant, powder, body lotions, perfume, after shave or    cologne.

## 2019-03-15 NOTE — ANESTHESIA PREPROCEDURE EVALUATION
03/15/2019  Deja Theodore is a 48 y.o., female.    Anesthesia Evaluation    I have reviewed the Patient Summary Reports.    I have reviewed the Nursing Notes.   I have reviewed the Medications.     Review of Systems  Anesthesia Hx:  No problems with previous Anesthesia    Social:  Non-Smoker    Cardiovascular:   Exercise tolerance: good    Pulmonary:  Pulmonary Normal    Hepatic/GI:  Hepatic/GI Normal    Musculoskeletal:   Arthritis     Neurological:   Neuromuscular Disease, Headaches    Endocrine:  Endocrine Normal        Physical Exam  General:  Well nourished    Airway/Jaw/Neck:  Airway Findings: Mouth Opening: Normal Tongue: Normal  General Airway Assessment: Adult  Mallampati: II  TM Distance: Normal, at least 6 cm  Jaw/Neck Findings:     Neck ROM: Normal ROM      Dental:  Dental Findings: In tact             Anesthesia Plan  Type of Anesthesia, risks & benefits discussed:  Anesthesia Type:  general  Patient's Preference:   Intra-op Monitoring Plan:   Intra-op Monitoring Plan Comments:   Post Op Pain Control Plan:   Post Op Pain Control Plan Comments:   Induction:   IV  Beta Blocker:         Informed Consent: Patient understands risks and agrees with Anesthesia plan.  Questions answered. Anesthesia consent signed with patient.  ASA Score: 2     Day of Surgery Review of History & Physical:    H&P update referred to the surgeon.     Anesthesia Plan Notes: Recent labs in [facility].        Ready For Surgery From Anesthesia Perspective.

## 2019-03-18 ENCOUNTER — HOSPITAL ENCOUNTER (OUTPATIENT)
Facility: OTHER | Age: 49
Discharge: HOME | End: 2019-03-18
Attending: OBSTETRICS & GYNECOLOGY | Admitting: OBSTETRICS & GYNECOLOGY
Payer: COMMERCIAL

## 2019-03-18 ENCOUNTER — ANESTHESIA (OUTPATIENT)
Dept: SURGERY | Facility: OTHER | Age: 49
End: 2019-03-18
Payer: COMMERCIAL

## 2019-03-18 VITALS
BODY MASS INDEX: 20.61 KG/M2 | TEMPERATURE: 98 F | SYSTOLIC BLOOD PRESSURE: 118 MMHG | HEIGHT: 62 IN | HEART RATE: 88 BPM | DIASTOLIC BLOOD PRESSURE: 61 MMHG | WEIGHT: 112 LBS | RESPIRATION RATE: 16 BRPM | OXYGEN SATURATION: 99 %

## 2019-03-18 DIAGNOSIS — N83.202 LEFT OVARIAN CYST: ICD-10-CM

## 2019-03-18 DIAGNOSIS — Z98.890 S/P LAPAROSCOPY: ICD-10-CM

## 2019-03-18 DIAGNOSIS — N80.9 ENDOMETRIOSIS DETERMINED BY LAPAROSCOPY: Primary | ICD-10-CM

## 2019-03-18 DIAGNOSIS — K82.4 GALLBLADDER POLYP: ICD-10-CM

## 2019-03-18 DIAGNOSIS — R10.2 FEMALE PELVIC PAIN: ICD-10-CM

## 2019-03-18 LAB
ABO + RH BLD: NORMAL
B-HCG UR QL: NEGATIVE
BLD GP AB SCN CELLS X3 SERPL QL: NORMAL
CTP QC/QA: YES

## 2019-03-18 PROCEDURE — 63600175 PHARM REV CODE 636 W HCPCS: Performed by: ANESTHESIOLOGY

## 2019-03-18 PROCEDURE — 58662 LAPAROSCOPY EXCISE LESIONS: CPT | Mod: 53,,, | Performed by: OBSTETRICS & GYNECOLOGY

## 2019-03-18 PROCEDURE — 36000709 HC OR TIME LEV III EA ADD 15 MIN: Performed by: OBSTETRICS & GYNECOLOGY

## 2019-03-18 PROCEDURE — 71000016 HC POSTOP RECOV ADDL HR: Performed by: OBSTETRICS & GYNECOLOGY

## 2019-03-18 PROCEDURE — 25000003 PHARM REV CODE 250: Performed by: ANESTHESIOLOGY

## 2019-03-18 PROCEDURE — 71000015 HC POSTOP RECOV 1ST HR: Performed by: OBSTETRICS & GYNECOLOGY

## 2019-03-18 PROCEDURE — 63600175 PHARM REV CODE 636 W HCPCS: Performed by: NURSE ANESTHETIST, CERTIFIED REGISTERED

## 2019-03-18 PROCEDURE — 36000708 HC OR TIME LEV III 1ST 15 MIN: Performed by: OBSTETRICS & GYNECOLOGY

## 2019-03-18 PROCEDURE — 37000009 HC ANESTHESIA EA ADD 15 MINS: Performed by: OBSTETRICS & GYNECOLOGY

## 2019-03-18 PROCEDURE — 27201423 OPTIME MED/SURG SUP & DEVICES STERILE SUPPLY: Performed by: OBSTETRICS & GYNECOLOGY

## 2019-03-18 PROCEDURE — 37000008 HC ANESTHESIA 1ST 15 MINUTES: Performed by: OBSTETRICS & GYNECOLOGY

## 2019-03-18 PROCEDURE — 86901 BLOOD TYPING SEROLOGIC RH(D): CPT

## 2019-03-18 PROCEDURE — 63600175 PHARM REV CODE 636 W HCPCS: Performed by: SURGERY

## 2019-03-18 PROCEDURE — 81025 URINE PREGNANCY TEST: CPT | Performed by: ANESTHESIOLOGY

## 2019-03-18 PROCEDURE — 58662 PR LAP,FULGURATE/EXCISE LESIONS: ICD-10-PCS | Mod: 53,,, | Performed by: OBSTETRICS & GYNECOLOGY

## 2019-03-18 PROCEDURE — 71000033 HC RECOVERY, INTIAL HOUR: Performed by: OBSTETRICS & GYNECOLOGY

## 2019-03-18 PROCEDURE — 36415 COLL VENOUS BLD VENIPUNCTURE: CPT

## 2019-03-18 PROCEDURE — 25000003 PHARM REV CODE 250: Performed by: NURSE ANESTHETIST, CERTIFIED REGISTERED

## 2019-03-18 RX ORDER — HYDROCODONE BITARTRATE AND ACETAMINOPHEN 10; 325 MG/1; MG/1
1 TABLET ORAL EVERY 4 HOURS PRN
Status: DISCONTINUED | OUTPATIENT
Start: 2019-03-18 | End: 2019-03-18 | Stop reason: HOSPADM

## 2019-03-18 RX ORDER — LIDOCAINE HYDROCHLORIDE 10 MG/ML
0.5 INJECTION, SOLUTION EPIDURAL; INFILTRATION; INTRACAUDAL; PERINEURAL ONCE
Status: DISCONTINUED | OUTPATIENT
Start: 2019-03-18 | End: 2019-03-18 | Stop reason: HOSPADM

## 2019-03-18 RX ORDER — ROCURONIUM BROMIDE 10 MG/ML
INJECTION, SOLUTION INTRAVENOUS
Status: DISCONTINUED | OUTPATIENT
Start: 2019-03-18 | End: 2019-03-18

## 2019-03-18 RX ORDER — DEXTROSE MONOHYDRATE, SODIUM CHLORIDE, AND POTASSIUM CHLORIDE 50; 1.49; 4.5 G/1000ML; G/1000ML; G/1000ML
INJECTION, SOLUTION INTRAVENOUS CONTINUOUS
Status: DISCONTINUED | OUTPATIENT
Start: 2019-03-18 | End: 2019-03-18 | Stop reason: HOSPADM

## 2019-03-18 RX ORDER — PROPOFOL 10 MG/ML
VIAL (ML) INTRAVENOUS
Status: DISCONTINUED | OUTPATIENT
Start: 2019-03-18 | End: 2019-03-18

## 2019-03-18 RX ORDER — FENTANYL CITRATE 50 UG/ML
25 INJECTION, SOLUTION INTRAMUSCULAR; INTRAVENOUS EVERY 5 MIN PRN
Status: COMPLETED | OUTPATIENT
Start: 2019-03-18 | End: 2019-03-18

## 2019-03-18 RX ORDER — HYDROCODONE BITARTRATE AND ACETAMINOPHEN 5; 325 MG/1; MG/1
1 TABLET ORAL EVERY 6 HOURS PRN
Qty: 8 TABLET | Refills: 0 | Status: SHIPPED | OUTPATIENT
Start: 2019-03-18 | End: 2019-04-10 | Stop reason: CLARIF

## 2019-03-18 RX ORDER — SODIUM CHLORIDE, SODIUM LACTATE, POTASSIUM CHLORIDE, CALCIUM CHLORIDE 600; 310; 30; 20 MG/100ML; MG/100ML; MG/100ML; MG/100ML
INJECTION, SOLUTION INTRAVENOUS CONTINUOUS
Status: DISCONTINUED | OUTPATIENT
Start: 2019-03-18 | End: 2019-03-18 | Stop reason: HOSPADM

## 2019-03-18 RX ORDER — IBUPROFEN 600 MG/1
600 TABLET ORAL EVERY 6 HOURS PRN
Status: DISCONTINUED | OUTPATIENT
Start: 2019-03-18 | End: 2019-03-18 | Stop reason: HOSPADM

## 2019-03-18 RX ORDER — SODIUM CHLORIDE 0.9 % (FLUSH) 0.9 %
3 SYRINGE (ML) INJECTION
Status: DISCONTINUED | OUTPATIENT
Start: 2019-03-18 | End: 2019-03-18 | Stop reason: HOSPADM

## 2019-03-18 RX ORDER — PHENYLEPHRINE HYDROCHLORIDE 10 MG/ML
INJECTION INTRAVENOUS
Status: DISCONTINUED | OUTPATIENT
Start: 2019-03-18 | End: 2019-03-18

## 2019-03-18 RX ORDER — OXYCODONE HYDROCHLORIDE 5 MG/1
5 TABLET ORAL
Status: DISCONTINUED | OUTPATIENT
Start: 2019-03-18 | End: 2019-03-18 | Stop reason: HOSPADM

## 2019-03-18 RX ORDER — MEPERIDINE HYDROCHLORIDE 25 MG/ML
12.5 INJECTION INTRAMUSCULAR; INTRAVENOUS; SUBCUTANEOUS ONCE AS NEEDED
Status: DISCONTINUED | OUTPATIENT
Start: 2019-03-18 | End: 2019-03-18 | Stop reason: HOSPADM

## 2019-03-18 RX ORDER — LIDOCAINE HCL/PF 100 MG/5ML
SYRINGE (ML) INTRAVENOUS
Status: DISCONTINUED | OUTPATIENT
Start: 2019-03-18 | End: 2019-03-18

## 2019-03-18 RX ORDER — DIPHENHYDRAMINE HCL 25 MG
25 CAPSULE ORAL EVERY 4 HOURS PRN
Status: DISCONTINUED | OUTPATIENT
Start: 2019-03-18 | End: 2019-03-18 | Stop reason: HOSPADM

## 2019-03-18 RX ORDER — ESMOLOL HYDROCHLORIDE 10 MG/ML
INJECTION INTRAVENOUS
Status: DISCONTINUED | OUTPATIENT
Start: 2019-03-18 | End: 2019-03-18

## 2019-03-18 RX ORDER — ONDANSETRON 2 MG/ML
4 INJECTION INTRAMUSCULAR; INTRAVENOUS DAILY PRN
Status: DISCONTINUED | OUTPATIENT
Start: 2019-03-18 | End: 2019-03-18 | Stop reason: HOSPADM

## 2019-03-18 RX ORDER — FAMOTIDINE 20 MG/1
20 TABLET, FILM COATED ORAL
Status: COMPLETED | OUTPATIENT
Start: 2019-03-18 | End: 2019-03-18

## 2019-03-18 RX ORDER — NEOSTIGMINE METHYLSULFATE 1 MG/ML
INJECTION, SOLUTION INTRAVENOUS
Status: DISCONTINUED | OUTPATIENT
Start: 2019-03-18 | End: 2019-03-18

## 2019-03-18 RX ORDER — ONDANSETRON 2 MG/ML
INJECTION INTRAMUSCULAR; INTRAVENOUS
Status: DISCONTINUED | OUTPATIENT
Start: 2019-03-18 | End: 2019-03-18

## 2019-03-18 RX ORDER — GLYCOPYRROLATE 0.2 MG/ML
INJECTION INTRAMUSCULAR; INTRAVENOUS
Status: DISCONTINUED | OUTPATIENT
Start: 2019-03-18 | End: 2019-03-18

## 2019-03-18 RX ORDER — FENTANYL CITRATE 50 UG/ML
INJECTION, SOLUTION INTRAMUSCULAR; INTRAVENOUS
Status: DISCONTINUED | OUTPATIENT
Start: 2019-03-18 | End: 2019-03-18

## 2019-03-18 RX ORDER — HYDROMORPHONE HYDROCHLORIDE 2 MG/ML
0.4 INJECTION, SOLUTION INTRAMUSCULAR; INTRAVENOUS; SUBCUTANEOUS EVERY 5 MIN PRN
Status: DISCONTINUED | OUTPATIENT
Start: 2019-03-18 | End: 2019-03-18 | Stop reason: HOSPADM

## 2019-03-18 RX ORDER — ONDANSETRON 8 MG/1
8 TABLET, ORALLY DISINTEGRATING ORAL EVERY 8 HOURS PRN
Status: DISCONTINUED | OUTPATIENT
Start: 2019-03-18 | End: 2019-03-18 | Stop reason: HOSPADM

## 2019-03-18 RX ORDER — IBUPROFEN 800 MG/1
800 TABLET ORAL EVERY 8 HOURS PRN
Qty: 30 TABLET | Refills: 0 | Status: SHIPPED | OUTPATIENT
Start: 2019-03-18 | End: 2019-06-18

## 2019-03-18 RX ORDER — MIDAZOLAM HYDROCHLORIDE 1 MG/ML
INJECTION INTRAMUSCULAR; INTRAVENOUS
Status: DISCONTINUED | OUTPATIENT
Start: 2019-03-18 | End: 2019-03-18

## 2019-03-18 RX ORDER — SODIUM CHLORIDE 9 MG/ML
INJECTION, SOLUTION INTRAVENOUS CONTINUOUS
Status: DISCONTINUED | OUTPATIENT
Start: 2019-03-18 | End: 2019-03-18 | Stop reason: HOSPADM

## 2019-03-18 RX ORDER — CEFAZOLIN SODIUM 1 G/3ML
2 INJECTION, POWDER, FOR SOLUTION INTRAMUSCULAR; INTRAVENOUS
Status: DISCONTINUED | OUTPATIENT
Start: 2019-03-18 | End: 2019-03-18 | Stop reason: HOSPADM

## 2019-03-18 RX ORDER — HYDROCODONE BITARTRATE AND ACETAMINOPHEN 5; 325 MG/1; MG/1
1 TABLET ORAL EVERY 4 HOURS PRN
Status: DISCONTINUED | OUTPATIENT
Start: 2019-03-18 | End: 2019-03-18 | Stop reason: HOSPADM

## 2019-03-18 RX ADMIN — PHENYLEPHRINE HYDROCHLORIDE 200 MCG: 10 INJECTION INTRAVENOUS at 02:03

## 2019-03-18 RX ADMIN — PHENYLEPHRINE HYDROCHLORIDE 100 MCG: 10 INJECTION INTRAVENOUS at 02:03

## 2019-03-18 RX ADMIN — LIDOCAINE HYDROCHLORIDE 50 MG: 20 INJECTION, SOLUTION INTRAVENOUS at 01:03

## 2019-03-18 RX ADMIN — FENTANYL CITRATE 50 MCG: 50 INJECTION, SOLUTION INTRAMUSCULAR; INTRAVENOUS at 01:03

## 2019-03-18 RX ADMIN — ROCURONIUM BROMIDE 30 MG: 10 INJECTION, SOLUTION INTRAVENOUS at 01:03

## 2019-03-18 RX ADMIN — MIDAZOLAM HYDROCHLORIDE 2 MG: 1 INJECTION, SOLUTION INTRAMUSCULAR; INTRAVENOUS at 01:03

## 2019-03-18 RX ADMIN — ONDANSETRON 4 MG: 2 INJECTION INTRAMUSCULAR; INTRAVENOUS at 02:03

## 2019-03-18 RX ADMIN — NEOSTIGMINE METHYLSULFATE 5 MG: 1 INJECTION INTRAVENOUS at 02:03

## 2019-03-18 RX ADMIN — SODIUM CHLORIDE, SODIUM LACTATE, POTASSIUM CHLORIDE, AND CALCIUM CHLORIDE: 600; 310; 30; 20 INJECTION, SOLUTION INTRAVENOUS at 12:03

## 2019-03-18 RX ADMIN — CEFAZOLIN 2 G: 330 INJECTION, POWDER, FOR SOLUTION INTRAMUSCULAR; INTRAVENOUS at 02:03

## 2019-03-18 RX ADMIN — FENTANYL CITRATE 25 MCG: 50 INJECTION, SOLUTION INTRAMUSCULAR; INTRAVENOUS at 03:03

## 2019-03-18 RX ADMIN — ESMOLOL HYDROCHLORIDE 30 MG: 10 INJECTION INTRAVENOUS at 02:03

## 2019-03-18 RX ADMIN — OXYCODONE HYDROCHLORIDE 5 MG: 5 TABLET ORAL at 03:03

## 2019-03-18 RX ADMIN — GLYCOPYRROLATE 0.8 MG: 0.2 INJECTION, SOLUTION INTRAMUSCULAR; INTRAVENOUS at 02:03

## 2019-03-18 RX ADMIN — FENTANYL CITRATE 50 MCG: 50 INJECTION, SOLUTION INTRAMUSCULAR; INTRAVENOUS at 02:03

## 2019-03-18 RX ADMIN — CARBOXYMETHYLCELLULOSE SODIUM 2 DROP: 2.5 SOLUTION/ DROPS OPHTHALMIC at 01:03

## 2019-03-18 RX ADMIN — FAMOTIDINE 20 MG: 20 TABLET, FILM COATED ORAL at 10:03

## 2019-03-18 RX ADMIN — PROPOFOL 60 MG: 10 INJECTION, EMULSION INTRAVENOUS at 01:03

## 2019-03-18 NOTE — INTERVAL H&P NOTE
The patient has been examined and the H&P has been reviewed:    I concur with the findings and no changes have occurred since H&P was written.    Anesthesia/Surgery risks, benefits and alternative options discussed and understood by patient/family.          Active Hospital Problems    Diagnosis  POA    *Left ovarian cyst [N83.202]  Yes    Gallbladder polyp [K82.4]  Yes    Female pelvic pain [R10.2]  Yes      Resolved Hospital Problems   No resolved problems to display.

## 2019-03-18 NOTE — PLAN OF CARE
Deja Theodore has met all discharge criteria from Phase II. Vital Signs are stable, ambulating  without difficulty. Discharge instructions given, patient verbalized understanding. Discharged from facility via wheelchair in stable condition.

## 2019-03-18 NOTE — DISCHARGE INSTRUCTIONS
Abdominal Laparoscopy Discharge Instructions      Activity  · Limit your activity for 4-6 weeks.  · Dont lift anything heavier than 5-10 pounds.  · Avoid strenuous activities, such as mowing the lawn, vacuuming, or playing sports.  · Limit your activity to short, slow walks. Gradually increase your pace and distance as you feel able.  · Listen to your body. If an activity causes pain, stop.  · Rest when you are tired.  · Dont have sexual intercourse or use tampons or douches until your doctor says its safe to do so.    Home Care  · Always keep your incision clean and dry.@[email or web address](Error - No data available.)@  · Shower as instructed in 24 hours. You may wash your incision gently with mild soap and warm water and pat dry.  Avoid tub baths, hot tubs and swimming pools until seen by your physician for a post-op follow up.  · If you have steri strips they should fall off in 7-10 days.    · If you have band aids covering your incisions change daily or when soiled.  The band aids may be removed post op day 1 if they are clean and dry.  · Take your medication exactly as instructed by your doctor.  · Return to your diet as you feel able. Eat a healthy, well-balanced diet.  · Avoid constipation.  ¨ Use laxatives, stool softeners, or enemas as directed by your doctor.  ¨ Eat more high-fiber foods.  ¨ Drink 6-8 glasses of water every day, unless directed otherwise.  Follow-Up  Make a follow-up appointment as directed by our staff.       When to Call Your Doctor  Call your doctor right away if you have any of the following:  · Fever above 101.5°F  (38.6°C) or chills  · Bright red vaginal bleeding or a foul-smelling discharge  · Vaginal bleeding that soaks more than one sanitary pad per hour  · Trouble urinating or burning sensation when you urinate  · Severe abdominal pain or bloating  · Redness, swelling, or drainage at your incision site  · Shortness of breath         ________________________________________________________________

## 2019-03-18 NOTE — INTERVAL H&P NOTE
The patient has been examined and the H&P has been reviewed:    I concur with the findings and no changes have occurred since H&P was written.    Anesthesia/Surgery risks, benefits and alternative options discussed and understood by patient/family.    Leticia Ramachandran MD  OB/GYN  PGY-1          Active Hospital Problems    Diagnosis  POA    *Left ovarian cyst [N83.202]  Yes    Gallbladder polyp [K82.4]  Yes    Female pelvic pain [R10.2]  Yes      Resolved Hospital Problems   No resolved problems to display.

## 2019-03-18 NOTE — TRANSFER OF CARE
"Anesthesia Transfer of Care Note    Patient: Deja Theodore    Procedure(s) Performed: Procedure(s) (LRB):  LAPAROSCOPY, DIAGNOSTIC (N/A)    Patient location: PACU    Anesthesia Type: general    Transport from OR: Transported from OR on 2-3 L/min O2 by NC with adequate spontaneous ventilation    Post pain: adequate analgesia    Post assessment: no apparent anesthetic complications    Post vital signs: stable    Level of consciousness: awake    Nausea/Vomiting: no nausea/vomiting    Complications: none    Transfer of care protocol was followed      Last vitals:   Visit Vitals  BP (!) 112/55 (BP Location: Right arm, Patient Position: Lying)   Pulse 80   Temp 36.4 °C (97.5 °F) (Oral)   Resp 16   Ht 5' 2" (1.575 m)   Wt 50.8 kg (112 lb)   LMP 02/27/2019   SpO2 100%   Breastfeeding? No   BMI 20.49 kg/m²     "

## 2019-03-18 NOTE — PROGRESS NOTES
SUBJECTIVE:   48 y.o. female   for pelvic pain. Patient's last menstrual period was 2019..  Reports left sided back pain that radiates to left side.  Started 6 days ago.  H/o back pain, but this feels different.  Was 2 out of 10 in intensity.  Now improved to a 4 or 5 out of 10.  Has bloating.  Pain worse with BM and urination.  No fever.  Improved with lying down.  Periods are irregular.  They come every 4-5 weeks, but has bleeding for a week , and sometimes spotting for an additional week.  No contraception.  Had CT scan done 3-13-19 which showed left 5 cm ovarian cyst and no free fluid.  Pelvic u/s shows 6 cm left ovarian cyst.         Past Medical History:   Diagnosis Date    Anemia     Arthritis     Carpal tunnel syndrome     Keratitis secondary to contact lens     OD    Other disorders of eyelid(374.89)     MGD    Spondylolisthesis      Past Surgical History:   Procedure Laterality Date    ADENOIDECTOMY      NOSE SURGERY      Age 18-cosmetic    TONSILLECTOMY       Social History     Socioeconomic History    Marital status: Legally      Spouse name: Not on file    Number of children: 2    Years of education: Not on file    Highest education level: Not on file   Social Needs    Financial resource strain: Not on file    Food insecurity - worry: Not on file    Food insecurity - inability: Not on file    Transportation needs - medical: Not on file    Transportation needs - non-medical: Not on file   Occupational History     Employer: not employed   Tobacco Use    Smoking status: Former Smoker     Packs/day: 0.30     Years: 10.00     Pack years: 3.00     Types: Cigarettes     Last attempt to quit: 2005     Years since quittin.4    Smokeless tobacco: Never Used   Substance and Sexual Activity    Alcohol use: Yes     Comment: Rare    Drug use: No    Sexual activity: Yes     Partners: Male     Birth control/protection: None   Other Topics Concern    Not on file    Social History Narrative    Not on file     Family History   Problem Relation Age of Onset    Depression Mother     Mental illness Mother     Hypertension Mother     Hypertension Father     Cancer Sister     Asthma Brother     Mental illness Brother     Asthma Son     Amblyopia Neg Hx     Blindness Neg Hx     Cataracts Neg Hx     Diabetes Neg Hx     Glaucoma Neg Hx     Macular degeneration Neg Hx     Retinal detachment Neg Hx     Strabismus Neg Hx     Stroke Neg Hx     Thyroid disease Neg Hx     Breast cancer Neg Hx      OB History    Para Term  AB Living   1 1 1 0   1   SAB TAB Ectopic Multiple Live Births           1      # Outcome Date GA Lbr Khurram/2nd Weight Sex Delivery Anes PTL Lv   1 Term 06    M Vag-Spont   ABUNDIO            Current Outpatient Medications   Medication Sig Dispense Refill    hydrOXYzine pamoate (VISTARIL) 25 MG Cap TK 1 TO 2 CS PO QHS PRF INSOMNIA  0    sertraline (ZOLOFT) 100 MG tablet TK 1 T PO QD  0    urea 10 % Lotn Apply 1 application topically once daily. To dry skin on the feet. 177 mL 6     No current facility-administered medications for this visit.      Allergies: No known allergies     ROS:  Constitutional: no weight loss, weight gain, fever, fatigue  Eyes:  No vision changes, glasses/contacts  ENT/Mouth: No ulcers, sinus problems, ears ringing, headache  Cardiovascular: No inability to lie flat, chest pain, exercise intolerance, swelling, heart palpitations  Respiratory: No wheezing, coughing blood, shortness of breath, or cough  Gastrointestinal: No diarrhea, bloody stool, nausea/vomiting, constipation, gas, hemorrhoids  Genitourinary: No blood in urine, painful urination, urgency of urination, frequency of urination, incomplete emptying, incontinence, abnormal bleeding, painful periods, heavy periods, vaginal discharge, vaginal odor, painful intercourse, sexual problems, bleeding after intercourse.  Musculoskeletal: No muscle  "weakness  Skin/Breast: No painful breasts, nipple discharge, masses, rash, ulcers  Neurological: No passing out, seizures, numbness, headache  Endocrine: No diabetes, hypothyroid, hyperthyroid, hot flashes, hair loss, abnormal hair growth, ance  Psychiatric: No depression, crying  Hematologic: No bruises, bleeding, swollen lymph nodes, anemia.      OBJECTIVE:   The patient appears well, alert, oriented x 3, in no distress.  /70 (BP Location: Right arm, Patient Position: Sitting, BP Method: Medium (Manual))   Ht 4' 11" (1.499 m)   Wt 51.2 kg (112 lb 14 oz)   LMP 02/27/2019   BMI 22.80 kg/m²   NECK: no thyromegaly, trachea midline  SKIN: no acne, striae, hirsutism  CHEST: CTAB  CV: RRR  BREAST EXAM: deferred  ABDOMEN: no hernias, masses, or hepatosplenomegaly  GENITALIA: normal external genitalia, no erythema, bloody discharge  URETHRA: normal urethra, normal urethral meatus  VAGINA: Normal  CERVIX: no lesions or cervical motion tenderness  UTERUS: normal size, contour, position, consistency, mobility, some tenderness with manipulation  ADNEXA: no mass, fullness, + mild tenderness      ASSESSMENT:   1. Left ovarian cyst     2. Female pelvic pain         PLAN:       Discussed pelvic pain and u/s.  No free fluid present.  Complex and not simple.  Seems hemorrhagic less likely.  Possible torsion vs endometrioma.  Discussed surgery for pain and cyst vs observation and repeat u/s in 2-4-6 weeks to assess for resolution.  Patient desires surgery due to significant pain.  Consents signed.  Return to clinic for surgery  Answers for HPI/ROS submitted by the patient on 3/13/2019   Gynecologic exam  genital itching: No  genital lesions: No  genital odor: No  genital rash: No  missed menses: No  pelvic pain: Yes  vaginal bleeding: Yes  vaginal discharge: No  Chronicity: new  Onset: in the past 7 days  Frequency: constantly  Progression since onset: waxing and waning  Pain severity: severe  Affected side: left  Pregnant " now?: No  abdominal pain: No  anorexia: No  back pain: Yes  chills: No  constipation: No  diarrhea: No  discolored urine: No  dysuria: Yes  fever: No  flank pain: Yes  frequency: No  headaches: Yes  hematuria: No  nausea: No  painful intercourse: No  rash: No  urgency: No  vomiting: No  Please select the characteristics of your discharge: : brown  Vaginal bleeding: spotting  Passing clots?: No  Passing tissue?: No  Aggravated by: nothing  treatments tried: nothing  Improvement on treatment: no relief  Sexual activity: not sexually active  Partner with STD symptoms: no  Birth control: nothing  Menstrual history: regular  STD: No  abdominal surgery: No   section: No  Ectopic pregnancy: No  Endometriosis: No  herpes simplex: No  gynecological surgery: No  menorrhagia: No  metrorrhagia: No  miscarriage: No  ovarian cysts: No  perineal abscess: No  PID: No  terminated pregnancy: No  vaginosis: No

## 2019-03-18 NOTE — H&P
SUBJECTIVE:   48 y.o. female   for pelvic pain. Patient's last menstrual period was 2019..  Reports left sided back pain that radiates to left side.  Started 6 days ago.  H/o back pain, but this feels different.  Was 2 out of 10 in intensity.  Now improved to a 4 or 5 out of 10.  Has bloating.  Pain worse with BM and urination.  No fever.  Improved with lying down.  Periods are irregular.  They come every 4-5 weeks, but has bleeding for a week , and sometimes spotting for an additional week.  No contraception.  Had CT scan done 3-13-19 which showed left 5 cm ovarian cyst and no free fluid.  Pelvic u/s shows 6 cm left ovarian cyst.             Past Medical History:   Diagnosis Date    Anemia      Arthritis      Carpal tunnel syndrome      Keratitis secondary to contact lens       OD    Other disorders of eyelid(374.89)       MGD    Spondylolisthesis              Past Surgical History:   Procedure Laterality Date    ADENOIDECTOMY        NOSE SURGERY         Age 18-cosmetic    TONSILLECTOMY          Social History               Socioeconomic History    Marital status: Legally        Spouse name: Not on file    Number of children: 2    Years of education: Not on file    Highest education level: Not on file   Social Needs    Financial resource strain: Not on file    Food insecurity - worry: Not on file    Food insecurity - inability: Not on file    Transportation needs - medical: Not on file    Transportation needs - non-medical: Not on file   Occupational History       Employer: not employed   Tobacco Use    Smoking status: Former Smoker       Packs/day: 0.30       Years: 10.00       Pack years: 3.00       Types: Cigarettes       Last attempt to quit: 2005       Years since quittin.4    Smokeless tobacco: Never Used   Substance and Sexual Activity    Alcohol use: Yes       Comment: Rare    Drug use: No    Sexual activity: Yes       Partners: Male       Birth  control/protection: None   Other Topics Concern    Not on file   Social History Narrative    Not on file               Family History   Problem Relation Age of Onset    Depression Mother      Mental illness Mother      Hypertension Mother      Hypertension Father      Cancer Sister      Asthma Brother      Mental illness Brother      Asthma Son      Amblyopia Neg Hx      Blindness Neg Hx      Cataracts Neg Hx      Diabetes Neg Hx      Glaucoma Neg Hx      Macular degeneration Neg Hx      Retinal detachment Neg Hx      Strabismus Neg Hx      Stroke Neg Hx      Thyroid disease Neg Hx      Breast cancer Neg Hx                         OB History    Para Term  AB Living   1 1 1 0   1   SAB TAB Ectopic Multiple Live Births           1       # Outcome Date GA Lbr Khurram/2nd Weight Sex Delivery Anes PTL Lv   1 Term /       M Vag-Spont     ABUNDIO                Current Medications          Current Outpatient Medications   Medication Sig Dispense Refill    hydrOXYzine pamoate (VISTARIL) 25 MG Cap TK 1 TO 2 CS PO QHS PRF INSOMNIA   0    sertraline (ZOLOFT) 100 MG tablet TK 1 T PO QD   0    urea 10 % Lotn Apply 1 application topically once daily. To dry skin on the feet. 177 mL 6      No current facility-administered medications for this visit.          Allergies: No known allergies      ROS:  Constitutional: no weight loss, weight gain, fever, fatigue  Eyes:  No vision changes, glasses/contacts  ENT/Mouth: No ulcers, sinus problems, ears ringing, headache  Cardiovascular: No inability to lie flat, chest pain, exercise intolerance, swelling, heart palpitations  Respiratory: No wheezing, coughing blood, shortness of breath, or cough  Gastrointestinal: No diarrhea, bloody stool, nausea/vomiting, constipation, gas, hemorrhoids  Genitourinary: No blood in urine, painful urination, urgency of urination, frequency of urination, incomplete emptying, incontinence, abnormal bleeding, painful  "periods, heavy periods, vaginal discharge, vaginal odor, painful intercourse, sexual problems, bleeding after intercourse.  Musculoskeletal: No muscle weakness  Skin/Breast: No painful breasts, nipple discharge, masses, rash, ulcers  Neurological: No passing out, seizures, numbness, headache  Endocrine: No diabetes, hypothyroid, hyperthyroid, hot flashes, hair loss, abnormal hair growth, ance  Psychiatric: No depression, crying  Hematologic: No bruises, bleeding, swollen lymph nodes, anemia.        OBJECTIVE:   The patient appears well, alert, oriented x 3, in no distress.  /70 (BP Location: Right arm, Patient Position: Sitting, BP Method: Medium (Manual))   Ht 4' 11" (1.499 m)   Wt 51.2 kg (112 lb 14 oz)   LMP 02/27/2019   BMI 22.80 kg/m²   NECK: no thyromegaly, trachea midline  SKIN: no acne, striae, hirsutism  CHEST: CTAB  CV: RRR  BREAST EXAM: deferred  ABDOMEN: no hernias, masses, or hepatosplenomegaly  GENITALIA: normal external genitalia, no erythema, bloody discharge  URETHRA: normal urethra, normal urethral meatus  VAGINA: Normal  CERVIX: no lesions or cervical motion tenderness  UTERUS: normal size, contour, position, consistency, mobility, some tenderness with manipulation  ADNEXA: no mass, fullness, + mild tenderness        ASSESSMENT:   1. Left ovarian cyst      2. Female pelvic pain            PLAN:       Discussed pelvic pain and u/s.  No free fluid present.  Complex and not simple.  Seems hemorrhagic less likely.  Possible torsion vs endometrioma.  Discussed surgery for pain and cyst vs observation and repeat u/s in 2-4-6 weeks to assess for resolution.  Patient desires surgery due to significant pain.  Consents signed.  Return to clinic for surgery    "

## 2019-03-18 NOTE — ANESTHESIA POSTPROCEDURE EVALUATION
"Anesthesia Post Evaluation    Patient: Deja Theodore    Procedure(s) Performed: Procedure(s) (LRB):  LAPAROSCOPY, DIAGNOSTIC (N/A)    Final Anesthesia Type: general  Patient location during evaluation: PACU  Patient participation: Yes- Able to Participate  Level of consciousness: awake and alert  Post-procedure vital signs: reviewed and stable  Pain management: adequate  Airway patency: patent  PONV status at discharge: No PONV  Anesthetic complications: no      Cardiovascular status: hemodynamically stable  Respiratory status: unassisted  Hydration status: euvolemic  Follow-up not needed.        Visit Vitals  /64 (BP Location: Right arm, Patient Position: Lying)   Pulse 87   Temp 36.6 °C (97.8 °F) (Oral)   Resp 16   Ht 5' 2" (1.575 m)   Wt 50.8 kg (112 lb)   LMP 02/27/2019   SpO2 100%   Breastfeeding? No   BMI 20.49 kg/m²       Pain/Yasir Score: Pain Rating Prior to Med Admin: 7 (3/18/2019  3:25 PM)  Pain Rating Post Med Admin: 4 (3/18/2019  3:34 PM)  Yasir Score: 10 (3/18/2019  4:35 PM)        "

## 2019-03-18 NOTE — OP NOTE
OPERATIVE REPORT    Date of procedure: 03/18/2019    PREOPERATIVE DIAGNOSIS  1. Pelvic pain  2. Ovarian cyst    POSTOPERATIVE DIAGNOSIS  1. Stage IV endometriosis    PROCEDURE:  1. Diagnostic laparoscopy    SURGEON: Dr. Peggy Carrero MD    ASSISTANT: Tay Jones MD    ANESTHESIA: General    COMPLICATIONS: None    EBL: Minimal     IV FLUIDS: 700 cc    URINE OUTPUT: 100 cc    FINDINGS:   1. Enlarged uterus approximately 9 weeks size  2. Stage 4 endometriosis, bilateral fallopian tubes and ovaries fully encapsulated, ~6cm cyst on left ovary  3. Cystectomy aborted    PROCEDURE:   Patient was taking to the operating room where general anesthesia was administered and found to be adequate.  She was prepped and draped in the dorsal lithotomy position.  A razo was placed. A weighted sterile speculum was placed in the vagina.  The anterior lip of the cervix was grasped with a single tooth tenaculum.  The uterus was sounded to approximately 9cm cm. REBA manipulator was placed in the cavity.    Gloves were changed. A 10 mm infraumbilical skin incision was made with the scalpel.   A Veress needle was inserted into the umbilicus under tenting of the anterior abdominal wall.  Placement into the peritoneal cavity was confirmed via saline drop test and low CO2 pressure.  The abdomen was insufflated to 15mm Hg using Carbon dioxide.    A 10 mm Optiview trocar was advanced through this incision.  Excellent hemostasis was noted.  The patient was placed in deep Trendelenburg.  An 5 mm left lateral skin incision was made with a scalpel and a 5 mm trocar was advanced through this incision under visualization of the camera.  A 5 mm right lateral skin incision was made with the scalpel.  A 5 mm trocar was advanced through this incision under visualization of the camera.  Excellent hemostasis was noted.      Findings per above. Stage IV endometriosis noted in the pelvis.  Bilateral tubes and ovaries adhesed to posterior ovarian fossa  and posterior uterus.  Rectum may be adhesed to posterior cervix.  Ureters unable to be seen crossing pelvic brim or blow.  The left ovary was densely adhesed and unable to be easily resected.  Initial decision for surgery was made due to left ovarian complex cyst and pelvic pain.  However, due to extensive adhesive disease, decision made to abort this surgery.  Pain may not be relieved by left ovarian cystectomy, and further dissection may be needed to comlete laparoscopic left ovarian cystectomy.  Patient will be counseled on severity of pelvic adhesions, and recommendation for hysterectomy +/- BSO.  This may require extensive dissection/ ROB and possibly laparotomy, as well as a preoperative bowel prep.  And patient will need to be prepared for longer recovery time.  Alternatively, patient may desire trial of Lupron therapy.    Attention was turned to the anterior abdominal wall. The abdomen was deflated and all trocars were removed. The 1cm infraumbilical fascial incision was closed with 0-vicryl. The skin incisions were closed with 4-0 Monocryl in a subcuticular fashion.  Sponge, lap, and needle counts were correct x 2. The razo and REBA were removed. The patient was taken to the recovery room in stable condition with the razo draining urine.      Hernán Jones MD   PGY-4 Ob-gyn

## 2019-03-20 ENCOUNTER — TELEPHONE (OUTPATIENT)
Dept: OBSTETRICS AND GYNECOLOGY | Facility: CLINIC | Age: 49
End: 2019-03-20

## 2019-03-20 DIAGNOSIS — R10.2 PELVIC PAIN: ICD-10-CM

## 2019-03-20 DIAGNOSIS — N80.9 ENDOMETRIOSIS: Primary | ICD-10-CM

## 2019-03-20 NOTE — TELEPHONE ENCOUNTER
Discuss no availability to do LUÍS/BSO on 04/12/2019- Patient verbalized understanding and proceed with 04/15/2019 schedule.

## 2019-03-21 NOTE — DISCHARGE SUMMARY
[facility]  Discharge Summary  Gynecology      Admit Date: 3/18/2019    Discharge Date and Time: 3/18/2019  6:13 PM    Attending Physician: Peggy Carrero MD    Discharge Provider: MCKENZIE Jones    Reason for Admission: Ovarian cyst    Procedures Performed: Procedure(s) (LRB):  LAPAROSCOPY, DIAGNOSTIC (N/A)    Hospital Course: Patient presented for scheduled procedure. Patient was passed back to OR for ovarian cyst removal however Stage IV endometriosis was noted. Please see OP note for further details. Tolerated procedure well and patient was taken to recovery in a stable condition. Prior to discharge patient was able to void, ambulate, tolerate PO and pain was well controlled with PO meds. Patient was given routine post-op instructions for which patient voiced understanding. Patient was subsequently discharged home.      Final Diagnoses:   Principal Problem: S/P laparoscopy   Secondary Diagnoses:   Active Hospital Problems    Diagnosis  POA    *S/P laparoscopy [Z98.890]  Not Applicable    Gallbladder polyp [K82.4]  Yes    Endometriosis determined by laparoscopy [N80.9]  No    Female pelvic pain [R10.2]  Yes    Left ovarian cyst [N83.202]  Yes      Resolved Hospital Problems   No resolved problems to display.       Discharged Condition: good    Disposition: Home or Self Care    Follow Up/Patient Instructions:     Medications:  Reconciled Home Medications:      Medication List      START taking these medications    HYDROcodone-acetaminophen 5-325 mg per tablet  Commonly known as:  NORCO  Take 1 tablet by mouth every 6 (six) hours as needed for Pain.     ibuprofen 800 MG tablet  Commonly known as:  ADVIL,MOTRIN  Take 1 tablet (800 mg total) by mouth every 8 (eight) hours as needed for Pain.        CONTINUE taking these medications    hydrOXYzine pamoate 25 MG Cap  Commonly known as:  VISTARIL  TK 1 TO 2 CS PO QHS PRF INSOMNIA     sertraline 100 MG tablet  Commonly known as:  ZOLOFT  TK  1 T PO QD     urea 10 % Lotn  Apply 1 application topically once daily. To dry skin on the feet.          Discharge Procedure Orders   Diet general     Call MD for:  temperature >100.4     Call MD for:  persistent nausea and vomiting     Call MD for:  severe uncontrolled pain     Call MD for:  difficulty breathing, headache or visual disturbances     Call MD for:  redness, tenderness, or signs of infection (pain, swelling, redness, odor or green/yellow discharge around incision site)     Call MD for:  hives     Call MD for:  persistent dizziness or light-headedness     Call MD for:   Order Comments: Heavy vaginal bleeding >1 pad/hr x 2 hours, fundal tenderness       Hernán Jones MD   PGY-4 Ob-gyn

## 2019-04-01 DIAGNOSIS — L84 CALLUS OF FOOT: ICD-10-CM

## 2019-04-01 RX ORDER — UREA 1 ML/10ML
1 LOTION TOPICAL DAILY
Qty: 177 ML | Refills: 10 | Status: SHIPPED | OUTPATIENT
Start: 2019-04-01 | End: 2020-01-20

## 2019-04-03 ENCOUNTER — OFFICE VISIT (OUTPATIENT)
Dept: OBSTETRICS AND GYNECOLOGY | Facility: CLINIC | Age: 49
End: 2019-04-03
Attending: OBSTETRICS & GYNECOLOGY
Payer: COMMERCIAL

## 2019-04-03 ENCOUNTER — PATIENT MESSAGE (OUTPATIENT)
Dept: OBSTETRICS AND GYNECOLOGY | Facility: CLINIC | Age: 49
End: 2019-04-03

## 2019-04-03 VITALS
DIASTOLIC BLOOD PRESSURE: 70 MMHG | WEIGHT: 109.81 LBS | SYSTOLIC BLOOD PRESSURE: 110 MMHG | BODY MASS INDEX: 21.56 KG/M2 | HEIGHT: 60 IN

## 2019-04-03 DIAGNOSIS — N80.9 ENDOMETRIOSIS DETERMINED BY LAPAROSCOPY: Primary | ICD-10-CM

## 2019-04-03 DIAGNOSIS — R10.2 FEMALE PELVIC PAIN: ICD-10-CM

## 2019-04-03 DIAGNOSIS — N83.202 LEFT OVARIAN CYST: ICD-10-CM

## 2019-04-03 PROBLEM — Z98.890 S/P LAPAROSCOPY: Status: RESOLVED | Noted: 2019-03-18 | Resolved: 2019-04-03

## 2019-04-03 PROCEDURE — 99214 OFFICE O/P EST MOD 30 MIN: CPT | Mod: 57,S$GLB,, | Performed by: OBSTETRICS & GYNECOLOGY

## 2019-04-03 PROCEDURE — 3008F BODY MASS INDEX DOCD: CPT | Mod: CPTII,S$GLB,, | Performed by: OBSTETRICS & GYNECOLOGY

## 2019-04-03 PROCEDURE — 87480 CANDIDA DNA DIR PROBE: CPT

## 2019-04-03 PROCEDURE — 99999 PR PBB SHADOW E&M-EST. PATIENT-LVL III: ICD-10-PCS | Mod: PBBFAC,,, | Performed by: OBSTETRICS & GYNECOLOGY

## 2019-04-03 PROCEDURE — 87510 GARDNER VAG DNA DIR PROBE: CPT

## 2019-04-03 PROCEDURE — 99214 PR OFFICE/OUTPT VISIT, EST, LEVL IV, 30-39 MIN: ICD-10-PCS | Mod: 57,S$GLB,, | Performed by: OBSTETRICS & GYNECOLOGY

## 2019-04-03 PROCEDURE — 3008F PR BODY MASS INDEX (BMI) DOCUMENTED: ICD-10-PCS | Mod: CPTII,S$GLB,, | Performed by: OBSTETRICS & GYNECOLOGY

## 2019-04-03 PROCEDURE — 99999 PR PBB SHADOW E&M-EST. PATIENT-LVL III: CPT | Mod: PBBFAC,,, | Performed by: OBSTETRICS & GYNECOLOGY

## 2019-04-03 NOTE — LETTER
April 7, 2019      Danielle Glass PA-C  [address] [facility] [address]           [facility] [id number]  [address] [address]  Phone: [phone]  Fax: [phone]          Patient: Deja Theodore   MR Number: [id number]   YOB: 1970   Date of Visit: 4/3/2019       Dear Danielle Glass:    Thank you for referring Deja Theodore to me for evaluation. Attached you will find relevant portions of my assessment and plan of care.    If you have questions, please do not hesitate to call me. I look forward to following Deja Theodore along with you.    Sincerely,    Peggy Carrero MD    Enclosure  CC:  No Recipients    If you would like to receive this communication electronically, please contact [email or web address] or [phone] to request more information on [employer] access.    For providers and/or their staff who would like to refer a patient to [facility], please contact us through our one-stop-shop provider referral line, [facility], at [phone].    If you feel you have received this communication in error or would no longer like to receive these types of communications, please e-mail [email or web address]

## 2019-04-04 LAB
BACTERIAL VAGINOSIS DNA: NEGATIVE
CANDIDA GLABRATA DNA: NEGATIVE
CANDIDA KRUSEI DNA: NEGATIVE
CANDIDA RRNA VAG QL PROBE: NEGATIVE
T VAGINALIS RRNA GENITAL QL PROBE: NEGATIVE

## 2019-04-08 NOTE — H&P
SUBJECTIVE:   48 y.o. female   for follow up of endometriosis. Patient's last menstrual period was 2019 (approximate)..  Reports long history of AUB, dyspareunia, dysmenorrhea, but she never really voiced complaints.   Periods are irregular.  They come every 4-5 weeks, but has bleeding for a week, and sometimes spotting for an additional week.  No contraception. Currently not sexually active.  Had CT scan done 3-13-19 which showed left 5 cm ovarian cyst and no free fluid.  Pelvic u/s shows 6 cm left ovarian cyst.  Patient had Dx laparoscopy 3/18/19 for left ovarian cyst and pain.  Stage 4 endometriosis noted.  Patient here to discuss further management.  H/o  and then infertility with failed IVF.  Adopted another child.  Does not desire future child-bearing.  Desires hysterectomy.  Declines ovarian cystectomy with preservation of ovaries and desires BSO.  Discussed endometriosis and mgt with Depo Lupron with conservation of ovaries.  Patient declines hormonal mgt of endo.  Desires BSO.  Aware of risks of surgical menopause.  Has some hot flashes already.  Had hot flashes in the past when on hormones for IVF.  Does not want future surgery for pain or endometriosis if ovaries left in situ.  She is approaching menopausal age.  Will agree to HRT as needed.  H/o migraines with some negative SE of birth control pills.               Past Medical History:   Diagnosis Date    Anemia      Arthritis      Carpal tunnel syndrome      Keratitis secondary to contact lens       OD    Other disorders of eyelid(374.89)       MGD    Spondylolisthesis              Past Surgical History:   Procedure Laterality Date    ADENOIDECTOMY        LAPAROSCOPY, DIAGNOSTIC N/A 3/18/2019     [facility] by [facility]    NOSE SURGERY         Age 18-cosmetic    TONSILLECTOMY          Social History               Socioeconomic History    Marital status: Legally        Spouse name: Not on file     Number of children: 2    Years of education: Not on file    Highest education level: Not on file   Occupational History       Employer: not employed   Social Needs    Financial resource strain: Not on file    Food insecurity:       Worry: Not on file       Inability: Not on file    Transportation needs:       Medical: Not on file       Non-medical: Not on file   Tobacco Use    Smoking status: Former Smoker       Packs/day: 0.30       Years: 10.00       Pack years: 3.00       Types: Cigarettes       Last attempt to quit: 2005       Years since quittin.5    Smokeless tobacco: Never Used   Substance and Sexual Activity    Alcohol use: Yes       Comment: Rare    Drug use: No    Sexual activity: Not Currently       Partners: Male       Birth control/protection: None   Lifestyle    Physical activity:       Days per week: Not on file       Minutes per session: Not on file    Stress: Not on file   Relationships    Social connections:       Talks on phone: Not on file       Gets together: Not on file       Attends [employer] service: Not on file       Active member of club or organization: Not on file       Attends meetings of clubs or organizations: Not on file       Relationship status: Not on file   Other Topics Concern    Not on file   Social History Narrative    Not on file               Family History   Problem Relation Age of Onset    Depression Mother      Mental illness Mother      Hypertension Mother      Hypertension Father      Cancer Sister      Asthma Brother      Mental illness Brother      Asthma Son      Amblyopia Neg Hx      Blindness Neg Hx      Cataracts Neg Hx      Diabetes Neg Hx      Glaucoma Neg Hx      Macular degeneration Neg Hx      Retinal detachment Neg Hx      Strabismus Neg Hx      Stroke Neg Hx      Thyroid disease Neg Hx      Breast cancer Neg Hx                         OB History    Para Term  AB Living   1 1 1 0   1   SAB TAB  Ectopic Multiple Live Births              1          # Outcome Date GA Lbr Khurram/2nd Weight Sex Delivery Anes PTL Lv   1 Term 11/12/06       M Vag-Spont     ABUNDIO            Current Medications          Current Outpatient Medications   Medication Sig Dispense Refill    HYDROcodone-acetaminophen (NORCO) 5-325 mg per tablet Take 1 tablet by mouth every 6 (six) hours as needed for Pain. 8 tablet 0    hydrOXYzine pamoate (VISTARIL) 25 MG Cap TK 1 TO 2 CS PO QHS PRF INSOMNIA   0    ibuprofen (ADVIL,MOTRIN) 800 MG tablet Take 1 tablet (800 mg total) by mouth every 8 (eight) hours as needed for Pain. 30 tablet 0    sertraline (ZOLOFT) 100 MG tablet TK 1 T PO QD   0    urea 10 % Lotn Apply 1 application topically once daily. To dry skin on the feet. 177 mL 10      No current facility-administered medications for this visit.          Allergies: No known allergies      ROS:  Constitutional: no weight loss, weight gain, fever, fatigue  Eyes:  No vision changes, glasses/contacts  ENT/Mouth: No ulcers, sinus problems, ears ringing, headache  Cardiovascular: No inability to lie flat, chest pain, exercise intolerance, swelling, heart palpitations  Respiratory: No wheezing, coughing blood, shortness of breath, or cough  Gastrointestinal: No diarrhea, bloody stool, nausea/vomiting, constipation, gas, hemorrhoids  Genitourinary: No blood in urine, painful urination, urgency of urination, frequency of urination, incomplete emptying, incontinence, +abnormal bleeding,+ painful periods, +heavy periods, vaginal discharge, vaginal odor,+ painful intercourse, sexual problems, bleeding after intercourse.  Musculoskeletal: No muscle weakness  Skin/Breast: No painful breasts, nipple discharge, masses, rash, ulcers  Neurological: No passing out, seizures, numbness, headache  Endocrine: No diabetes, hypothyroid, hyperthyroid, hot flashes, hair loss, abnormal hair growth, ance  Psychiatric: No depression, crying  Hematologic: No bruises,  bleeding, swollen lymph nodes, anemia.        OBJECTIVE:   The patient appears well, alert, oriented x 3, in no distress.  /70 (BP Location: Right arm, Patient Position: Sitting, BP Method: Medium (Manual))   Ht 5' (1.524 m)   Wt 49.8 kg (109 lb 12.6 oz)   LMP 03/18/2019 (Approximate)   BMI 21.44 kg/m²   NECK: no thyromegaly, trachea midline  SKIN: no acne, striae, hirsutism  CHEST: CTAB  CV: RRR  BREAST EXAM: deferred  ABDOMEN: no hernias, masses, or hepatosplenomegaly  GENITALIA: normal external genitalia, no erythema, no discharge  URETHRA: normal urethra, normal urethral meatus  VAGINA: Normal  CERVIX: no lesions or cervical motion tenderness  UTERUS: normal size, contour, position, consistency, mobility, non-tender  ADNEXA: no mass, fullness, +tenderness        ASSESSMENT:   1. Endometriosis determined by laparoscopy  Vaginosis Screen by DNA Probe   2. Left ovarian cyst      3. Female pelvic pain            PLAN:       Orders Placed This Encounter    Vaginosis Screen by DNA Probe      Discussed endometriosis, mgt options- Depo Lupron vs surgery.  Desires surgery.  Desires hysterectomy and BSO.  Aware of risks of menopause.  Declines Davinci hysterectomy/ BSO with Dr. Raya.  Desires LUÍS/BSO by me.  Consents signed.  Plan bowel prep.  Return to clinic for post op

## 2019-04-08 NOTE — PROGRESS NOTES
SUBJECTIVE:   48 y.o. female   for follow up of endometriosis. Patient's last menstrual period was 2019 (approximate)..  Reports long history of AUB, dyspareunia, dysmenorrhea, but she never really voiced complaints.   Periods are irregular.  They come every 4-5 weeks, but has bleeding for a week, and sometimes spotting for an additional week.  No contraception. Currently not sexually active.  Had CT scan done 3-13-19 which showed left 5 cm ovarian cyst and no free fluid.  Pelvic u/s shows 6 cm left ovarian cyst.  Patient had Dx laparoscopy 3/18/19 for left ovarian cyst and pain.  Stage 4 endometriosis noted.  Patient here to discuss further management.  H/o  and then infertility with failed IVF.  Adopted another child.  Does not desire future child-bearing.  Desires hysterectomy.  Declines ovarian cystectomy with preservation of ovaries and desires BSO.  Discussed endometriosis and mgt with Depo Lupron with conservation of ovaries.  Patient declines hormonal mgt of endo.  Desires BSO.  Aware of risks of surgical menopause.  Has some hot flashes already.  Had hot flashes in the past when on hormones for IVF.  Does not want future surgery for pain or endometriosis if ovaries left in situ.  She is approaching menopausal age.  Will agree to HRT as needed.  H/o migraines with some negative SE of birth control pills.           Past Medical History:   Diagnosis Date    Anemia     Arthritis     Carpal tunnel syndrome     Keratitis secondary to contact lens     OD    Other disorders of eyelid(374.89)     MGD    Spondylolisthesis      Past Surgical History:   Procedure Laterality Date    ADENOIDECTOMY      LAPAROSCOPY, DIAGNOSTIC N/A 3/18/2019    [facility]    NOSE SURGERY      Age 18-cosmetic    TONSILLECTOMY       Social History     Socioeconomic History    Marital status: Legally      Spouse name: Not on file    Number of children: 2    Years of  education: Not on file    Highest education level: Not on file   Occupational History     Employer: not employed   Social Needs    Financial resource strain: Not on file    Food insecurity:     Worry: Not on file     Inability: Not on file    Transportation needs:     Medical: Not on file     Non-medical: Not on file   Tobacco Use    Smoking status: Former Smoker     Packs/day: 0.30     Years: 10.00     Pack years: 3.00     Types: Cigarettes     Last attempt to quit: 2005     Years since quittin.5    Smokeless tobacco: Never Used   Substance and Sexual Activity    Alcohol use: Yes     Comment: Rare    Drug use: No    Sexual activity: Not Currently     Partners: Male     Birth control/protection: None   Lifestyle    Physical activity:     Days per week: Not on file     Minutes per session: Not on file    Stress: Not on file   Relationships    Social connections:     Talks on phone: Not on file     Gets together: Not on file     Attends [employer] service: Not on file     Active member of club or organization: Not on file     Attends meetings of clubs or organizations: Not on file     Relationship status: Not on file   Other Topics Concern    Not on file   Social History Narrative    Not on file     Family History   Problem Relation Age of Onset    Depression Mother     Mental illness Mother     Hypertension Mother     Hypertension Father     Cancer Sister     Asthma Brother     Mental illness Brother     Asthma Son     Amblyopia Neg Hx     Blindness Neg Hx     Cataracts Neg Hx     Diabetes Neg Hx     Glaucoma Neg Hx     Macular degeneration Neg Hx     Retinal detachment Neg Hx     Strabismus Neg Hx     Stroke Neg Hx     Thyroid disease Neg Hx     Breast cancer Neg Hx      OB History    Para Term  AB Living   1 1 1 0   1   SAB TAB Ectopic Multiple Live Births           1      # Outcome Date GA Lbr Khurram/2nd Weight Sex Delivery Anes PTL Lv   1 Term 06    M  Vag-Spont   ABUNDIO         Current Outpatient Medications   Medication Sig Dispense Refill    HYDROcodone-acetaminophen (NORCO) 5-325 mg per tablet Take 1 tablet by mouth every 6 (six) hours as needed for Pain. 8 tablet 0    hydrOXYzine pamoate (VISTARIL) 25 MG Cap TK 1 TO 2 CS PO QHS PRF INSOMNIA  0    ibuprofen (ADVIL,MOTRIN) 800 MG tablet Take 1 tablet (800 mg total) by mouth every 8 (eight) hours as needed for Pain. 30 tablet 0    sertraline (ZOLOFT) 100 MG tablet TK 1 T PO QD  0    urea 10 % Lotn Apply 1 application topically once daily. To dry skin on the feet. 177 mL 10     No current facility-administered medications for this visit.      Allergies: No known allergies     ROS:  Constitutional: no weight loss, weight gain, fever, fatigue  Eyes:  No vision changes, glasses/contacts  ENT/Mouth: No ulcers, sinus problems, ears ringing, headache  Cardiovascular: No inability to lie flat, chest pain, exercise intolerance, swelling, heart palpitations  Respiratory: No wheezing, coughing blood, shortness of breath, or cough  Gastrointestinal: No diarrhea, bloody stool, nausea/vomiting, constipation, gas, hemorrhoids  Genitourinary: No blood in urine, painful urination, urgency of urination, frequency of urination, incomplete emptying, incontinence, +abnormal bleeding,+ painful periods, +heavy periods, vaginal discharge, vaginal odor,+ painful intercourse, sexual problems, bleeding after intercourse.  Musculoskeletal: No muscle weakness  Skin/Breast: No painful breasts, nipple discharge, masses, rash, ulcers  Neurological: No passing out, seizures, numbness, headache  Endocrine: No diabetes, hypothyroid, hyperthyroid, hot flashes, hair loss, abnormal hair growth, ance  Psychiatric: No depression, crying  Hematologic: No bruises, bleeding, swollen lymph nodes, anemia.      OBJECTIVE:   The patient appears well, alert, oriented x 3, in no distress.  /70 (BP Location: Right arm, Patient Position: Sitting, BP  Method: Medium (Manual))   Ht 5' (1.524 m)   Wt 49.8 kg (109 lb 12.6 oz)   LMP 03/18/2019 (Approximate)   BMI 21.44 kg/m²   NECK: no thyromegaly, trachea midline  SKIN: no acne, striae, hirsutism  CHEST: CTAB  CV: RRR  BREAST EXAM: deferred  ABDOMEN: no hernias, masses, or hepatosplenomegaly  GENITALIA: normal external genitalia, no erythema, no discharge  URETHRA: normal urethra, normal urethral meatus  VAGINA: Normal  CERVIX: no lesions or cervical motion tenderness  UTERUS: normal size, contour, position, consistency, mobility, non-tender  ADNEXA: no mass, fullness, +tenderness      ASSESSMENT:   1. Endometriosis determined by laparoscopy  Vaginosis Screen by DNA Probe   2. Left ovarian cyst     3. Female pelvic pain         PLAN:   Orders Placed This Encounter    Vaginosis Screen by DNA Probe     Discussed endometriosis, mgt options- Depo Lupron vs surgery.  Desires surgery.  Desires hysterectomy and BSO.  Aware of risks of menopause.  Declines Davinci hysterectomy/ BSO with Dr. Raya.  Desires LUÍS/BSO by me.  Consents signed.  Plan bowel prep.  Return to clinic for post op

## 2019-04-10 ENCOUNTER — ANESTHESIA EVENT (OUTPATIENT)
Dept: SURGERY | Facility: OTHER | Age: 49
DRG: 743 | End: 2019-04-10
Payer: COMMERCIAL

## 2019-04-10 ENCOUNTER — HOSPITAL ENCOUNTER (OUTPATIENT)
Dept: PREADMISSION TESTING | Facility: OTHER | Age: 49
Discharge: HOME | End: 2019-04-10
Attending: OBSTETRICS & GYNECOLOGY
Payer: COMMERCIAL

## 2019-04-10 VITALS
TEMPERATURE: 99 F | OXYGEN SATURATION: 98 % | BODY MASS INDEX: 20.24 KG/M2 | HEART RATE: 84 BPM | DIASTOLIC BLOOD PRESSURE: 58 MMHG | SYSTOLIC BLOOD PRESSURE: 98 MMHG | WEIGHT: 110 LBS | HEIGHT: 62 IN

## 2019-04-10 DIAGNOSIS — N80.9 ENDOMETRIOSIS DETERMINED BY LAPAROSCOPY: ICD-10-CM

## 2019-04-10 LAB
ABO + RH BLD: NORMAL
BASOPHILS # BLD AUTO: 0.02 K/UL (ref 0–0.2)
BASOPHILS NFR BLD: 0.4 % (ref 0–1.9)
BLD GP AB SCN CELLS X3 SERPL QL: NORMAL
DIFFERENTIAL METHOD: ABNORMAL
EOSINOPHIL # BLD AUTO: 0.3 K/UL (ref 0–0.5)
EOSINOPHIL NFR BLD: 6.1 % (ref 0–8)
ERYTHROCYTE [DISTWIDTH] IN BLOOD BY AUTOMATED COUNT: 12.9 % (ref 11.5–14.5)
HCT VFR BLD AUTO: 37.5 % (ref 37–48.5)
HGB BLD-MCNC: 12.3 G/DL (ref 12–16)
LYMPHOCYTES # BLD AUTO: 1.4 K/UL (ref 1–4.8)
LYMPHOCYTES NFR BLD: 27.4 % (ref 18–48)
MCH RBC QN AUTO: 28.7 PG (ref 27–31)
MCHC RBC AUTO-ENTMCNC: 32.8 G/DL (ref 32–36)
MCV RBC AUTO: 88 FL (ref 82–98)
MONOCYTES # BLD AUTO: 0.4 K/UL (ref 0.3–1)
MONOCYTES NFR BLD: 7 % (ref 4–15)
NEUTROPHILS # BLD AUTO: 3.1 K/UL (ref 1.8–7.7)
NEUTROPHILS NFR BLD: 58.7 % (ref 38–73)
PLATELET # BLD AUTO: 162 K/UL (ref 150–350)
PMV BLD AUTO: 13.6 FL (ref 9.2–12.9)
RBC # BLD AUTO: 4.28 M/UL (ref 4–5.4)
WBC # BLD AUTO: 5.25 K/UL (ref 3.9–12.7)

## 2019-04-10 PROCEDURE — 36415 COLL VENOUS BLD VENIPUNCTURE: CPT

## 2019-04-10 PROCEDURE — 85025 COMPLETE CBC W/AUTO DIFF WBC: CPT

## 2019-04-10 PROCEDURE — 86850 RBC ANTIBODY SCREEN: CPT

## 2019-04-10 RX ORDER — SODIUM CHLORIDE, SODIUM LACTATE, POTASSIUM CHLORIDE, CALCIUM CHLORIDE 600; 310; 30; 20 MG/100ML; MG/100ML; MG/100ML; MG/100ML
INJECTION, SOLUTION INTRAVENOUS CONTINUOUS
Status: CANCELLED | OUTPATIENT
Start: 2019-04-10

## 2019-04-10 RX ORDER — FAMOTIDINE 20 MG/1
20 TABLET, FILM COATED ORAL
Status: CANCELLED | OUTPATIENT
Start: 2019-04-10 | End: 2019-04-10

## 2019-04-10 RX ORDER — ACETAMINOPHEN 500 MG
1000 TABLET ORAL
Status: CANCELLED | OUTPATIENT
Start: 2019-04-10 | End: 2019-04-10

## 2019-04-10 RX ORDER — PREGABALIN 75 MG/1
75 CAPSULE ORAL
Status: CANCELLED | OUTPATIENT
Start: 2019-04-10 | End: 2019-04-10

## 2019-04-10 NOTE — DISCHARGE INSTRUCTIONS
PRE-ADMIT TESTING -  [phone]    [address]  [place] [facility]          Your surgery has been scheduled at [facility]. We are pleased to have the opportunity to serve you. For Further Information please call [phone].    On the day of surgery please report to the Information Desk on the 1st floor.    · CONTACT YOUR PHYSICIAN'S OFFICE THE DAY PRIOR TO YOUR SURGERY TO OBTAIN YOUR ARRIVAL TIME.     · The evening before surgery do not eat anything after 9 p.m. ( this includes hard candy, chewing gum and mints).  You may only have GATORADE, POWERADE AND WATER  from 9 p.m. until you leave your home.   DO NOT DRINK ANY LIQUIDS ON THE WAY TO THE HOSPITAL.      SPECIAL MEDICATION INSTRUCTIONS: TAKE medications checked off by the Anesthesiologist on your Medication List.    Angiogram Patients: Take medications as instructed by your physician, including aspirin.     Surgery Patients:    If you take ASPIRIN - Your PHYSICIAN/SURGEON will need to inform you IF/OR when you need to stop taking aspirin prior to your surgery.     Do Not take any medications containing IBUPROFEN.  Do Not Wear any make-up or dark nail polish   (especially eye make-up) to surgery. If you come to surgery with makeup on you will be required to remove the makeup or nail polish.    Do not shave your surgical area at least 5 days prior to your surgery. The surgical prep will be performed at the hospital according to Infection Control regulations.    Leave all valuables at home.   Do Not wear any jewelry or watches, including any metal in body piercings. Jewelry must be removed prior to coming to the hospital.  There is a possibility that rings that are unable to be removed may be cut off if they are on the surgical extremity.    Contact Lens must be removed before surgery. Either do not wear the contact lens or bring a case and solution for storage.  Please bring a container for eyeglasses or dentures as required.  Bring  any paperwork your physician has provided, such as consent forms,  history and physicals, doctor's orders, etc.   Bring comfortable clothes that are loose fitting to wear upon discharge. Take into consideration the type of surgery being performed.  Maintain your diet as advised per your physician the day prior to surgery.      Adequate rest the night before surgery is advised.   Park in the Parking lot behind the hospital or in the [facility] across the street from the parking lot. Parking is complimentary.  If you will be discharged the same day as your procedure, please arrange for a responsible adult to drive you home or to accompany you if traveling by taxi.   YOU WILL NOT BE PERMITTED TO DRIVE OR TO LEAVE THE HOSPITAL ALONE AFTER SURGERY.   It is strongly recommended that you arrange for someone to remain with you for the first 24 hrs following your surgery.       Thank you for your cooperation.  The Staff of [facility].                Bathing Instructions with Hibiclens     Shower the evening before and morning of your procedure with Hibiclens:   Wash your face with water and your regular face wash/soap   Apply Hibiclens directly on your skin or on a wet washcloth and wash gently. When showering: Move away from the shower stream when applying Hibiclens to avoid rinsing off too soon.   Rinse thoroughly with warm water   Do not dilute Hibiclens         Dry off as usual, do not use any deodorant, powder, body lotions, perfume, after shave or cologne.

## 2019-04-10 NOTE — ANESTHESIA PREPROCEDURE EVALUATION
04/10/2019  Deja Theodore is a 48 y.o., female.    Anesthesia Evaluation    I have reviewed the Patient Summary Reports.    I have reviewed the Nursing Notes.   I have reviewed the Medications.     Review of Systems  Anesthesia Hx:  No problems with previous Anesthesia  Denies Family Hx of Anesthesia complications.   Denies Personal Hx of Anesthesia complications.   Social:  Non-Smoker    Cardiovascular:   Exercise tolerance: good    Pulmonary:  Pulmonary Normal    Hepatic/GI:  Hepatic/GI Normal    Musculoskeletal:   Arthritis     Neurological:   Neuromuscular Disease, Headaches    Endocrine:  Endocrine Normal        Physical Exam  General:  Well nourished    Airway/Jaw/Neck:  Airway Findings: Mouth Opening: Normal Tongue: Normal  General Airway Assessment: Adult  Mallampati: II  TM Distance: Normal, at least 6 cm  Jaw/Neck Findings:     Neck ROM: Normal ROM      Dental:  Dental Findings: In tact             Anesthesia Plan  Type of Anesthesia, risks & benefits discussed:  Anesthesia Type:  general  Patient's Preference:   Intra-op Monitoring Plan: standard ASA monitors  Intra-op Monitoring Plan Comments:   Post Op Pain Control Plan:   Post Op Pain Control Plan Comments:   Induction:   IV  Beta Blocker:         Informed Consent: Patient understands risks and agrees with Anesthesia plan.  Questions answered. Anesthesia consent signed with patient.  ASA Score: 2     Day of Surgery Review of History & Physical:    H&P update referred to the surgeon.     Anesthesia Plan Notes: benzodiazipines make her feel bad and aggressive.  Request no versed        Ready For Surgery From Anesthesia Perspective.

## 2019-04-11 ENCOUNTER — PATIENT MESSAGE (OUTPATIENT)
Dept: SURGERY | Facility: OTHER | Age: 49
End: 2019-04-11

## 2019-04-15 ENCOUNTER — HOSPITAL ENCOUNTER (INPATIENT)
Facility: OTHER | Age: 49
LOS: 2 days | Discharge: HOME | DRG: 743 | End: 2019-04-17
Attending: OBSTETRICS & GYNECOLOGY | Admitting: OBSTETRICS & GYNECOLOGY
Payer: COMMERCIAL

## 2019-04-15 ENCOUNTER — ANESTHESIA (OUTPATIENT)
Dept: SURGERY | Facility: OTHER | Age: 49
DRG: 743 | End: 2019-04-15
Payer: COMMERCIAL

## 2019-04-15 DIAGNOSIS — N80.9 ENDOMETRIOSIS DETERMINED BY LAPAROSCOPY: Primary | ICD-10-CM

## 2019-04-15 DIAGNOSIS — Z90.711 S/P ABDOMINAL SUPRACERVICAL SUBTOTAL HYSTERECTOMY: ICD-10-CM

## 2019-04-15 DIAGNOSIS — N80.9 ENDOMETRIOSIS: ICD-10-CM

## 2019-04-15 LAB
B-HCG UR QL: NEGATIVE
CTP QC/QA: YES
POCT GLUCOSE: 90 MG/DL (ref 70–110)

## 2019-04-15 PROCEDURE — 88307 TISSUE EXAM BY PATHOLOGIST: CPT | Mod: 26,,, | Performed by: PATHOLOGY

## 2019-04-15 PROCEDURE — 82962 GLUCOSE BLOOD TEST: CPT | Performed by: OBSTETRICS & GYNECOLOGY

## 2019-04-15 PROCEDURE — 36000709 HC OR TIME LEV III EA ADD 15 MIN: Performed by: OBSTETRICS & GYNECOLOGY

## 2019-04-15 PROCEDURE — C9290 INJ, BUPIVACAINE LIPOSOME: HCPCS | Performed by: OBSTETRICS & GYNECOLOGY

## 2019-04-15 PROCEDURE — 71000039 HC RECOVERY, EACH ADD'L HOUR: Performed by: OBSTETRICS & GYNECOLOGY

## 2019-04-15 PROCEDURE — 58180 PARTIAL HYSTERECTOMY: CPT | Mod: 82,,, | Performed by: OBSTETRICS & GYNECOLOGY

## 2019-04-15 PROCEDURE — 25000003 PHARM REV CODE 250: Performed by: ANESTHESIOLOGY

## 2019-04-15 PROCEDURE — 37000009 HC ANESTHESIA EA ADD 15 MINS: Performed by: OBSTETRICS & GYNECOLOGY

## 2019-04-15 PROCEDURE — 63600175 PHARM REV CODE 636 W HCPCS: Performed by: OBSTETRICS & GYNECOLOGY

## 2019-04-15 PROCEDURE — 63600175 PHARM REV CODE 636 W HCPCS: Performed by: ANESTHESIOLOGY

## 2019-04-15 PROCEDURE — 25000003 PHARM REV CODE 250: Performed by: NURSE ANESTHETIST, CERTIFIED REGISTERED

## 2019-04-15 PROCEDURE — 58180 PR SUPRACERV ABD HYSTERECTOMY: ICD-10-PCS | Mod: 58,,, | Performed by: OBSTETRICS & GYNECOLOGY

## 2019-04-15 PROCEDURE — 63600175 PHARM REV CODE 636 W HCPCS: Performed by: NURSE ANESTHETIST, CERTIFIED REGISTERED

## 2019-04-15 PROCEDURE — 81025 URINE PREGNANCY TEST: CPT | Performed by: ANESTHESIOLOGY

## 2019-04-15 PROCEDURE — 36000708 HC OR TIME LEV III 1ST 15 MIN: Performed by: OBSTETRICS & GYNECOLOGY

## 2019-04-15 PROCEDURE — 63600175 PHARM REV CODE 636 W HCPCS: Performed by: STUDENT IN AN ORGANIZED HEALTH CARE EDUCATION/TRAINING PROGRAM

## 2019-04-15 PROCEDURE — 88307 TISSUE SPECIMEN TO PATHOLOGY - SURGERY: ICD-10-PCS | Mod: 26,,, | Performed by: PATHOLOGY

## 2019-04-15 PROCEDURE — 27201423 OPTIME MED/SURG SUP & DEVICES STERILE SUPPLY: Performed by: OBSTETRICS & GYNECOLOGY

## 2019-04-15 PROCEDURE — 58180 PARTIAL HYSTERECTOMY: CPT | Mod: 58,,, | Performed by: OBSTETRICS & GYNECOLOGY

## 2019-04-15 PROCEDURE — 71000033 HC RECOVERY, INTIAL HOUR: Performed by: OBSTETRICS & GYNECOLOGY

## 2019-04-15 PROCEDURE — 11000001 HC ACUTE MED/SURG PRIVATE ROOM

## 2019-04-15 PROCEDURE — 94799 UNLISTED PULMONARY SVC/PX: CPT

## 2019-04-15 PROCEDURE — 88307 TISSUE EXAM BY PATHOLOGIST: CPT | Performed by: PATHOLOGY

## 2019-04-15 PROCEDURE — P9045 ALBUMIN (HUMAN), 5%, 250 ML: HCPCS | Mod: JG | Performed by: NURSE ANESTHETIST, CERTIFIED REGISTERED

## 2019-04-15 PROCEDURE — 37000008 HC ANESTHESIA 1ST 15 MINUTES: Performed by: OBSTETRICS & GYNECOLOGY

## 2019-04-15 PROCEDURE — 94761 N-INVAS EAR/PLS OXIMETRY MLT: CPT

## 2019-04-15 PROCEDURE — 25000003 PHARM REV CODE 250: Performed by: STUDENT IN AN ORGANIZED HEALTH CARE EDUCATION/TRAINING PROGRAM

## 2019-04-15 PROCEDURE — 58180 PR SUPRACERV ABD HYSTERECTOMY: ICD-10-PCS | Mod: 82,,, | Performed by: OBSTETRICS & GYNECOLOGY

## 2019-04-15 RX ORDER — MEPERIDINE HYDROCHLORIDE 25 MG/ML
12.5 INJECTION INTRAMUSCULAR; INTRAVENOUS; SUBCUTANEOUS ONCE AS NEEDED
Status: COMPLETED | OUTPATIENT
Start: 2019-04-15 | End: 2019-04-15

## 2019-04-15 RX ORDER — SERTRALINE HYDROCHLORIDE 100 MG/1
100 TABLET, FILM COATED ORAL DAILY
Status: DISCONTINUED | OUTPATIENT
Start: 2019-04-15 | End: 2019-04-16

## 2019-04-15 RX ORDER — ACETAMINOPHEN 500 MG
1000 TABLET ORAL
Status: COMPLETED | OUTPATIENT
Start: 2019-04-15 | End: 2019-04-15

## 2019-04-15 RX ORDER — ROCURONIUM BROMIDE 10 MG/ML
INJECTION, SOLUTION INTRAVENOUS
Status: DISCONTINUED | OUTPATIENT
Start: 2019-04-15 | End: 2019-04-15

## 2019-04-15 RX ORDER — SODIUM CHLORIDE, SODIUM LACTATE, POTASSIUM CHLORIDE, CALCIUM CHLORIDE 600; 310; 30; 20 MG/100ML; MG/100ML; MG/100ML; MG/100ML
INJECTION, SOLUTION INTRAVENOUS CONTINUOUS
Status: DISCONTINUED | OUTPATIENT
Start: 2019-04-15 | End: 2019-04-15

## 2019-04-15 RX ORDER — AMOXICILLIN 250 MG
1 CAPSULE ORAL 2 TIMES DAILY
Status: DISCONTINUED | OUTPATIENT
Start: 2019-04-15 | End: 2019-04-17 | Stop reason: HOSPADM

## 2019-04-15 RX ORDER — ONDANSETRON 8 MG/1
8 TABLET, ORALLY DISINTEGRATING ORAL EVERY 8 HOURS PRN
Status: DISCONTINUED | OUTPATIENT
Start: 2019-04-15 | End: 2019-04-17 | Stop reason: HOSPADM

## 2019-04-15 RX ORDER — KETOROLAC TROMETHAMINE 30 MG/ML
30 INJECTION, SOLUTION INTRAMUSCULAR; INTRAVENOUS EVERY 6 HOURS
Status: COMPLETED | OUTPATIENT
Start: 2019-04-15 | End: 2019-04-16

## 2019-04-15 RX ORDER — LIDOCAINE HCL/PF 100 MG/5ML
SYRINGE (ML) INTRAVENOUS
Status: DISCONTINUED | OUTPATIENT
Start: 2019-04-15 | End: 2019-04-15

## 2019-04-15 RX ORDER — FENTANYL CITRATE 50 UG/ML
INJECTION, SOLUTION INTRAMUSCULAR; INTRAVENOUS
Status: DISCONTINUED | OUTPATIENT
Start: 2019-04-15 | End: 2019-04-15

## 2019-04-15 RX ORDER — HYDROMORPHONE HYDROCHLORIDE 2 MG/ML
0.4 INJECTION, SOLUTION INTRAMUSCULAR; INTRAVENOUS; SUBCUTANEOUS EVERY 5 MIN PRN
Status: DISCONTINUED | OUTPATIENT
Start: 2019-04-15 | End: 2019-04-15 | Stop reason: HOSPADM

## 2019-04-15 RX ORDER — BISACODYL 10 MG
10 SUPPOSITORY, RECTAL RECTAL DAILY PRN
Status: DISCONTINUED | OUTPATIENT
Start: 2019-04-15 | End: 2019-04-17 | Stop reason: HOSPADM

## 2019-04-15 RX ORDER — SODIUM CHLORIDE 0.9 % (FLUSH) 0.9 %
3 SYRINGE (ML) INJECTION
Status: DISCONTINUED | OUTPATIENT
Start: 2019-04-15 | End: 2019-04-17 | Stop reason: HOSPADM

## 2019-04-15 RX ORDER — PANTOPRAZOLE SODIUM 40 MG/10ML
40 INJECTION, POWDER, LYOPHILIZED, FOR SOLUTION INTRAVENOUS
Status: DISCONTINUED | OUTPATIENT
Start: 2019-04-16 | End: 2019-04-17 | Stop reason: HOSPADM

## 2019-04-15 RX ORDER — PREGABALIN 75 MG/1
75 CAPSULE ORAL
Status: COMPLETED | OUTPATIENT
Start: 2019-04-15 | End: 2019-04-15

## 2019-04-15 RX ORDER — HYDROMORPHONE HYDROCHLORIDE 1 MG/ML
1 INJECTION, SOLUTION INTRAMUSCULAR; INTRAVENOUS; SUBCUTANEOUS EVERY 4 HOURS PRN
Status: DISCONTINUED | OUTPATIENT
Start: 2019-04-15 | End: 2019-04-17 | Stop reason: HOSPADM

## 2019-04-15 RX ORDER — MUPIROCIN 20 MG/G
1 OINTMENT TOPICAL 2 TIMES DAILY
Status: DISCONTINUED | OUTPATIENT
Start: 2019-04-15 | End: 2019-04-17 | Stop reason: HOSPADM

## 2019-04-15 RX ORDER — ONDANSETRON 2 MG/ML
INJECTION INTRAMUSCULAR; INTRAVENOUS
Status: DISCONTINUED | OUTPATIENT
Start: 2019-04-15 | End: 2019-04-15

## 2019-04-15 RX ORDER — PROPOFOL 10 MG/ML
VIAL (ML) INTRAVENOUS
Status: DISCONTINUED | OUTPATIENT
Start: 2019-04-15 | End: 2019-04-15

## 2019-04-15 RX ORDER — DIPHENHYDRAMINE HYDROCHLORIDE 50 MG/ML
25 INJECTION INTRAMUSCULAR; INTRAVENOUS EVERY 4 HOURS PRN
Status: DISCONTINUED | OUTPATIENT
Start: 2019-04-15 | End: 2019-04-17 | Stop reason: HOSPADM

## 2019-04-15 RX ORDER — ALBUMIN HUMAN 50 G/1000ML
SOLUTION INTRAVENOUS CONTINUOUS PRN
Status: DISCONTINUED | OUTPATIENT
Start: 2019-04-15 | End: 2019-04-15

## 2019-04-15 RX ORDER — PHENYLEPHRINE HYDROCHLORIDE 10 MG/ML
INJECTION INTRAVENOUS
Status: DISCONTINUED | OUTPATIENT
Start: 2019-04-15 | End: 2019-04-15

## 2019-04-15 RX ORDER — OXYCODONE AND ACETAMINOPHEN 10; 325 MG/1; MG/1
1 TABLET ORAL EVERY 4 HOURS PRN
Status: DISCONTINUED | OUTPATIENT
Start: 2019-04-15 | End: 2019-04-17 | Stop reason: HOSPADM

## 2019-04-15 RX ORDER — OXYCODONE AND ACETAMINOPHEN 5; 325 MG/1; MG/1
1 TABLET ORAL EVERY 4 HOURS PRN
Status: DISCONTINUED | OUTPATIENT
Start: 2019-04-15 | End: 2019-04-17 | Stop reason: HOSPADM

## 2019-04-15 RX ORDER — FAMOTIDINE 20 MG/1
20 TABLET, FILM COATED ORAL
Status: COMPLETED | OUTPATIENT
Start: 2019-04-15 | End: 2019-04-15

## 2019-04-15 RX ORDER — NEOSTIGMINE METHYLSULFATE 1 MG/ML
INJECTION, SOLUTION INTRAVENOUS
Status: DISCONTINUED | OUTPATIENT
Start: 2019-04-15 | End: 2019-04-15

## 2019-04-15 RX ORDER — GLYCOPYRROLATE 0.2 MG/ML
INJECTION INTRAMUSCULAR; INTRAVENOUS
Status: DISCONTINUED | OUTPATIENT
Start: 2019-04-15 | End: 2019-04-15

## 2019-04-15 RX ORDER — DIPHENHYDRAMINE HCL 25 MG
25 CAPSULE ORAL EVERY 4 HOURS PRN
Status: DISCONTINUED | OUTPATIENT
Start: 2019-04-15 | End: 2019-04-17 | Stop reason: HOSPADM

## 2019-04-15 RX ORDER — CEFAZOLIN SODIUM 1 G/3ML
2 INJECTION, POWDER, FOR SOLUTION INTRAMUSCULAR; INTRAVENOUS
Status: COMPLETED | OUTPATIENT
Start: 2019-04-15 | End: 2019-04-15

## 2019-04-15 RX ORDER — IBUPROFEN 600 MG/1
600 TABLET ORAL EVERY 6 HOURS
Status: DISCONTINUED | OUTPATIENT
Start: 2019-04-16 | End: 2019-04-17 | Stop reason: HOSPADM

## 2019-04-15 RX ORDER — ONDANSETRON 2 MG/ML
4 INJECTION INTRAMUSCULAR; INTRAVENOUS DAILY PRN
Status: DISCONTINUED | OUTPATIENT
Start: 2019-04-15 | End: 2019-04-15 | Stop reason: HOSPADM

## 2019-04-15 RX ORDER — DEXTROSE MONOHYDRATE, SODIUM CHLORIDE, AND POTASSIUM CHLORIDE 50; 1.49; 4.5 G/1000ML; G/1000ML; G/1000ML
INJECTION, SOLUTION INTRAVENOUS CONTINUOUS
Status: DISCONTINUED | OUTPATIENT
Start: 2019-04-15 | End: 2019-04-15

## 2019-04-15 RX ORDER — OXYCODONE HYDROCHLORIDE 5 MG/1
5 TABLET ORAL
Status: DISCONTINUED | OUTPATIENT
Start: 2019-04-15 | End: 2019-04-15 | Stop reason: HOSPADM

## 2019-04-15 RX ORDER — DEXTROSE MONOHYDRATE, SODIUM CHLORIDE, AND POTASSIUM CHLORIDE 50; 1.49; 4.5 G/1000ML; G/1000ML; G/1000ML
INJECTION, SOLUTION INTRAVENOUS CONTINUOUS
Status: DISCONTINUED | OUTPATIENT
Start: 2019-04-15 | End: 2019-04-17 | Stop reason: HOSPADM

## 2019-04-15 RX ADMIN — ONDANSETRON 4 MG: 2 INJECTION INTRAMUSCULAR; INTRAVENOUS at 01:04

## 2019-04-15 RX ADMIN — PHENYLEPHRINE HYDROCHLORIDE 200 MCG: 10 INJECTION INTRAVENOUS at 11:04

## 2019-04-15 RX ADMIN — OXYCODONE AND ACETAMINOPHEN 1 TABLET: 10; 325 TABLET ORAL at 04:04

## 2019-04-15 RX ADMIN — MUPIROCIN 1 G: 20 OINTMENT TOPICAL at 08:04

## 2019-04-15 RX ADMIN — ALBUMIN (HUMAN): 2.5 SOLUTION INTRAVENOUS at 01:04

## 2019-04-15 RX ADMIN — ROCURONIUM BROMIDE 10 MG: 10 INJECTION, SOLUTION INTRAVENOUS at 11:04

## 2019-04-15 RX ADMIN — NEOSTIGMINE METHYLSULFATE 4 MG: 1 INJECTION INTRAVENOUS at 01:04

## 2019-04-15 RX ADMIN — ALBUMIN (HUMAN): 2.5 SOLUTION INTRAVENOUS at 12:04

## 2019-04-15 RX ADMIN — PHENYLEPHRINE HYDROCHLORIDE 200 MCG: 10 INJECTION INTRAVENOUS at 01:04

## 2019-04-15 RX ADMIN — HYDROMORPHONE HYDROCHLORIDE 0.4 MG: 2 INJECTION INTRAMUSCULAR; INTRAVENOUS; SUBCUTANEOUS at 03:04

## 2019-04-15 RX ADMIN — LIDOCAINE HYDROCHLORIDE 75 MG: 20 INJECTION, SOLUTION INTRAVENOUS at 11:04

## 2019-04-15 RX ADMIN — KETOROLAC TROMETHAMINE 30 MG: 30 INJECTION, SOLUTION INTRAMUSCULAR at 11:04

## 2019-04-15 RX ADMIN — PHENYLEPHRINE HYDROCHLORIDE 100 MCG: 10 INJECTION INTRAVENOUS at 12:04

## 2019-04-15 RX ADMIN — OXYCODONE HYDROCHLORIDE 5 MG: 5 TABLET ORAL at 02:04

## 2019-04-15 RX ADMIN — FAMOTIDINE 20 MG: 20 TABLET, FILM COATED ORAL at 10:04

## 2019-04-15 RX ADMIN — STANDARDIZED SENNA CONCENTRATE AND DOCUSATE SODIUM 1 TABLET: 8.6; 5 TABLET, FILM COATED ORAL at 08:04

## 2019-04-15 RX ADMIN — OXYCODONE AND ACETAMINOPHEN 1 TABLET: 10; 325 TABLET ORAL at 08:04

## 2019-04-15 RX ADMIN — CEFAZOLIN 2 G: 330 INJECTION, POWDER, FOR SOLUTION INTRAMUSCULAR; INTRAVENOUS at 11:04

## 2019-04-15 RX ADMIN — SODIUM CHLORIDE, SODIUM LACTATE, POTASSIUM CHLORIDE, AND CALCIUM CHLORIDE: 600; 310; 30; 20 INJECTION, SOLUTION INTRAVENOUS at 10:04

## 2019-04-15 RX ADMIN — ACETAMINOPHEN 1000 MG: 500 TABLET ORAL at 10:04

## 2019-04-15 RX ADMIN — FENTANYL CITRATE 50 MCG: 50 INJECTION, SOLUTION INTRAMUSCULAR; INTRAVENOUS at 01:04

## 2019-04-15 RX ADMIN — HYDROMORPHONE HYDROCHLORIDE 0.4 MG: 2 INJECTION INTRAMUSCULAR; INTRAVENOUS; SUBCUTANEOUS at 02:04

## 2019-04-15 RX ADMIN — CARBOXYMETHYLCELLULOSE SODIUM 2 DROP: 2.5 SOLUTION/ DROPS OPHTHALMIC at 11:04

## 2019-04-15 RX ADMIN — FENTANYL CITRATE 50 MCG: 50 INJECTION, SOLUTION INTRAMUSCULAR; INTRAVENOUS at 11:04

## 2019-04-15 RX ADMIN — SODIUM CHLORIDE, SODIUM LACTATE, POTASSIUM CHLORIDE, AND CALCIUM CHLORIDE: 600; 310; 30; 20 INJECTION, SOLUTION INTRAVENOUS at 01:04

## 2019-04-15 RX ADMIN — GLYCOPYRROLATE 0.8 MG: 0.2 INJECTION, SOLUTION INTRAMUSCULAR; INTRAVENOUS at 01:04

## 2019-04-15 RX ADMIN — KETOROLAC TROMETHAMINE 30 MG: 30 INJECTION, SOLUTION INTRAMUSCULAR at 06:04

## 2019-04-15 RX ADMIN — FENTANYL CITRATE 100 MCG: 50 INJECTION, SOLUTION INTRAMUSCULAR; INTRAVENOUS at 11:04

## 2019-04-15 RX ADMIN — PROPOFOL 100 MG: 10 INJECTION, EMULSION INTRAVENOUS at 11:04

## 2019-04-15 RX ADMIN — SODIUM CHLORIDE, SODIUM LACTATE, POTASSIUM CHLORIDE, AND CALCIUM CHLORIDE: 600; 310; 30; 20 INJECTION, SOLUTION INTRAVENOUS at 12:04

## 2019-04-15 RX ADMIN — ROCURONIUM BROMIDE 10 MG: 10 INJECTION, SOLUTION INTRAVENOUS at 12:04

## 2019-04-15 RX ADMIN — ROCURONIUM BROMIDE 30 MG: 10 INJECTION, SOLUTION INTRAVENOUS at 11:04

## 2019-04-15 RX ADMIN — MEPERIDINE HYDROCHLORIDE 12.5 MG: 25 INJECTION INTRAMUSCULAR; INTRAVENOUS; SUBCUTANEOUS at 02:04

## 2019-04-15 RX ADMIN — FENTANYL CITRATE 50 MCG: 50 INJECTION, SOLUTION INTRAMUSCULAR; INTRAVENOUS at 12:04

## 2019-04-15 RX ADMIN — PREGABALIN 75 MG: 75 CAPSULE ORAL at 10:04

## 2019-04-15 RX ADMIN — DEXTROSE, SODIUM CHLORIDE, AND POTASSIUM CHLORIDE: 5; .45; .15 INJECTION INTRAVENOUS at 04:04

## 2019-04-15 RX ADMIN — GLYCOPYRROLATE 0.2 MG: 0.2 INJECTION, SOLUTION INTRAMUSCULAR; INTRAVENOUS at 11:04

## 2019-04-15 RX ADMIN — DEXTROSE, SODIUM CHLORIDE, AND POTASSIUM CHLORIDE: 5; .45; .15 INJECTION INTRAVENOUS at 11:04

## 2019-04-15 NOTE — INTERVAL H&P NOTE
The patient has been examined and the H&P has been reviewed:    I concur with the findings and no changes have occurred since H&P was written.    Anesthesia/Surgery risks, benefits and alternative options discussed and understood by patient/family.          Active Hospital Problems    Diagnosis  POA    *Endometriosis determined by laparoscopy [N80.9]  Yes    Endometriosis [N80.9]  Yes    Left ovarian cyst [N83.202]  Yes      Resolved Hospital Problems   No resolved problems to display.

## 2019-04-15 NOTE — BRIEF OP NOTE
[facility]  Brief Operative Note    SUMMARY     Surgery Date: 4/15/2019     Surgeon(s) and Role:     * Peggy Carrero MD - Primary     * Hira Raya IV, MD - Assisting  Howie Mckeon MD - Resident PGY3        Pre-op Diagnosis:    Endometriosis [N80.9]  Pelvic pain [R10.2]    Post-op Diagnosis:  Post-Op Diagnosis Codes:     * Endometriosis [N80.9]     * Pelvic pain [R10.2]  S/p supracervical abdominal hyst with BSO, left ureterolysis    Procedure(s):  HYSTERECTOMY, SUPRACERVICAL    Anesthesia: general    Description of Procedure:   1. Normal sized uterus  2. Bilateral ovarian adhesions to posterior uterus  3. Rectal adhesions to posterior cervix  4. Left ureterolysis   5. Left ovary (with cyst on ultrasound?) not easily accesible  6. Supracervical abdominal hyst with BSO    Estimated Blood Loss: 350 mL         Specimens:   Specimen (12h ago, onward)    Start     Ordered    04/15/19 1207  Specimen to Pathology - Surgery  Once     Comments:  1-Uterus, Cervix, Bilateral Tubes & Ovaries     Start Status     04/15/19 1207 Collected (04/15/19 1207) Order ID: [id number]       04/15/19 1207

## 2019-04-15 NOTE — NURSING
Patient arrived on unit via stretcher and transferred to bed independently. Bedside report done with CUCA Alarcon. VSS, family at bedside, patient and family oriented to room and call light. Resident on call paged for patient's home meds per patient request. Will continue to monitor.

## 2019-04-15 NOTE — TRANSFER OF CARE
"Anesthesia Transfer of Care Note    Patient: Deja Theodore    Procedure(s) Performed: Procedure(s):  HYSTERECTOMY, SUPRACERVICAL    Patient location: PACU    Anesthesia Type: general    Transport from OR: Transported from OR on 2-3 L/min O2 by NC with adequate spontaneous ventilation    Post pain: adequate analgesia    Post assessment: no apparent anesthetic complications    Post vital signs: stable    Level of consciousness: awake    Nausea/Vomiting: no nausea/vomiting    Transfer of care protocol was followed      Last vitals:   Visit Vitals  /62 (BP Location: Right arm, Patient Position: Lying)   Pulse 105   Temp 37.1 °C (98.7 °F) (Oral)   Resp 16   Ht 5' 2" (1.575 m)   Wt 49.9 kg (110 lb)   LMP 02/27/2019   SpO2 100%   Breastfeeding? No   BMI 20.12 kg/m²     "

## 2019-04-15 NOTE — H&P
SUBJECTIVE:   48 y.o. female   for follow up of endometriosis. Patient's last menstrual period was 2019 (approximate)..  Reports long history of AUB, dyspareunia, dysmenorrhea, but she never really voiced complaints.   Periods are irregular.  They come every 4-5 weeks, but has bleeding for a week, and sometimes spotting for an additional week.  No contraception. Currently not sexually active.  Had CT scan done 3-13-19 which showed left 5 cm ovarian cyst and no free fluid.  Pelvic u/s shows 6 cm left ovarian cyst.  Patient had Dx laparoscopy 3/18/19 for left ovarian cyst and pain.  Stage 4 endometriosis noted.  Patient here to discuss further management.  H/o  and then infertility with failed IVF.  Adopted another child.  Does not desire future child-bearing.  Desires hysterectomy.  Declines ovarian cystectomy with preservation of ovaries and desires BSO.  Discussed endometriosis and mgt with Depo Lupron with conservation of ovaries.  Patient declines hormonal mgt of endo.  Desires BSO.  Aware of risks of surgical menopause.  Has some hot flashes already.  Had hot flashes in the past when on hormones for IVF.  Does not want future surgery for pain or endometriosis if ovaries left in situ.  She is approaching menopausal age.  Will agree to HRT as needed.  H/o migraines with some negative SE of birth control pills.               Past Medical History:   Diagnosis Date    Anemia      Arthritis      Carpal tunnel syndrome      Keratitis secondary to contact lens       OD    Other disorders of eyelid(374.89)       MGD    Spondylolisthesis              Past Surgical History:   Procedure Laterality Date    ADENOIDECTOMY        LAPAROSCOPY, DIAGNOSTIC N/A 3/18/2019     Performed by [facility] at [facility]    NOSE SURGERY         Age 18-cosmetic    TONSILLECTOMY          Social History            Socioeconomic History    Marital status: Legally        Spouse name: Not on file     Number of children: 2    Years of education: Not on file    Highest education level: Not on file   Occupational History       Employer: not employed   Social Needs    Financial resource strain: Not on file    Food insecurity:       Worry: Not on file       Inability: Not on file    Transportation needs:       Medical: Not on file       Non-medical: Not on file   Tobacco Use    Smoking status: Former Smoker       Packs/day: 0.30       Years: 10.00       Pack years: 3.00       Types: Cigarettes       Last attempt to quit: 2005       Years since quittin.5    Smokeless tobacco: Never Used   Substance and Sexual Activity    Alcohol use: Yes       Comment: Rare    Drug use: No    Sexual activity: Not Currently       Partners: Male       Birth control/protection: None   Lifestyle    Physical activity:       Days per week: Not on file       Minutes per session: Not on file    Stress: Not on file   Relationships    Social connections:       Talks on phone: Not on file       Gets together: Not on file       Attends Sikhism service: Not on file       Active member of club or organization: Not on file       Attends meetings of clubs or organizations: Not on file       Relationship status: Not on file   Other Topics Concern    Not on file   Social History Narrative    Not on file            Family History   Problem Relation Age of Onset    Depression Mother      Mental illness Mother      Hypertension Mother      Hypertension Father      Cancer Sister      Asthma Brother      Mental illness Brother      Asthma Son      Amblyopia Neg Hx      Blindness Neg Hx      Cataracts Neg Hx      Diabetes Neg Hx      Glaucoma Neg Hx      Macular degeneration Neg Hx      Retinal detachment Neg Hx      Strabismus Neg Hx      Stroke Neg Hx      Thyroid disease Neg Hx      Breast cancer Neg Hx                         OB History    Para Term  AB Living   1 1 1 0   1   SAB TAB Ectopic  Multiple Live Births              1          # Outcome Date GA Lbr Khurram/2nd Weight Sex Delivery Anes PTL Lv   1 Term 11/12/06       M Vag-Spont     ABUNDIO                   Current Outpatient Medications   Medication Sig Dispense Refill    HYDROcodone-acetaminophen (NORCO) 5-325 mg per tablet Take 1 tablet by mouth every 6 (six) hours as needed for Pain. 8 tablet 0    hydrOXYzine pamoate (VISTARIL) 25 MG Cap TK 1 TO 2 CS PO QHS PRF INSOMNIA   0    ibuprofen (ADVIL,MOTRIN) 800 MG tablet Take 1 tablet (800 mg total) by mouth every 8 (eight) hours as needed for Pain. 30 tablet 0    sertraline (ZOLOFT) 100 MG tablet TK 1 T PO QD   0    urea 10 % Lotn Apply 1 application topically once daily. To dry skin on the feet. 177 mL 10      No current facility-administered medications for this visit.       Allergies: No known allergies      ROS:  Constitutional: no weight loss, weight gain, fever, fatigue  Eyes:  No vision changes, glasses/contacts  ENT/Mouth: No ulcers, sinus problems, ears ringing, headache  Cardiovascular: No inability to lie flat, chest pain, exercise intolerance, swelling, heart palpitations  Respiratory: No wheezing, coughing blood, shortness of breath, or cough  Gastrointestinal: No diarrhea, bloody stool, nausea/vomiting, constipation, gas, hemorrhoids  Genitourinary: No blood in urine, painful urination, urgency of urination, frequency of urination, incomplete emptying, incontinence, +abnormal bleeding,+ painful periods, +heavy periods, vaginal discharge, vaginal odor,+ painful intercourse, sexual problems, bleeding after intercourse.  Musculoskeletal: No muscle weakness  Skin/Breast: No painful breasts, nipple discharge, masses, rash, ulcers  Neurological: No passing out, seizures, numbness, headache  Endocrine: No diabetes, hypothyroid, hyperthyroid, hot flashes, hair loss, abnormal hair growth, ance  Psychiatric: No depression, crying  Hematologic: No bruises, bleeding, swollen lymph nodes,  anemia.        OBJECTIVE:   The patient appears well, alert, oriented x 3, in no distress.  /70 (BP Location: Right arm, Patient Position: Sitting, BP Method: Medium (Manual))   Ht 5' (1.524 m)   Wt 49.8 kg (109 lb 12.6 oz)   LMP 03/18/2019 (Approximate)   BMI 21.44 kg/m²   NECK: no thyromegaly, trachea midline  SKIN: no acne, striae, hirsutism  CHEST: CTAB  CV: RRR  BREAST EXAM: deferred  ABDOMEN: no hernias, masses, or hepatosplenomegaly  GENITALIA: normal external genitalia, no erythema, no discharge  URETHRA: normal urethra, normal urethral meatus  VAGINA: Normal  CERVIX: no lesions or cervical motion tenderness  UTERUS: normal size, contour, position, consistency, mobility, non-tender  ADNEXA: no mass, fullness, +tenderness        ASSESSMENT:   1. Endometriosis determined by laparoscopy  Vaginosis Screen by DNA Probe   2. Left ovarian cyst      3. Female pelvic pain            PLAN:       Orders Placed This Encounter    Vaginosis Screen by DNA Probe      Discussed endometriosis, mgt options- Depo Lupron vs surgery.  Desires surgery.  Desires hysterectomy and BSO.  Aware of risks of menopause.  Declines Davinci hysterectomy/ BSO with Dr. Raya.  Desires LUÍS/BSO by me.  Consents signed.  Plan bowel prep.  Return to clinic for post op

## 2019-04-15 NOTE — OP NOTE
[facility]  Operative Note    SUMMARY     Surgery Date: 4/15/2019     Surgeon(s) and Role:     * Peggy Carrero MD - Primary     * Hira Raya IV, MD - Assisting, no qualified resident available  Howie Mckeon MD - Resident PGY3        Pre-op Diagnosis:   Stage IV Endometriosis [N80.9]  Pelvic pain [R10.2]    Post-op Diagnosis:  Post-Op Diagnosis Codes:     * Stage IV Endometriosis [N80.9]     * Pelvic pain [R10.2]  S/p supracervical abdominal hyst with BSO, ROB, left ureterolysis    Procedure(s):  HYSTERECTOMY, SUPRACERVICAL HYSTERECTOMY  BILATERAL SALPINGO-OOPHORECTOMY  EXTENSIVE LYSIS OF ADHESIONS  LEFT URETEROLYSIS     Anesthesia: general    Description of Procedure:   1. Normal sized uterus  2. Bilateral ovarian adhesions to posterior uterus, rectum  3. Rectal adhesions to posterior cervix and ovary, obliterated posterior cul de sac  4. Left ureterolysis   5. Left ovarian cyst on ultrasound not easily accesible  6. Supracervical abdominal hyst with BSO    Estimated Blood Loss: 350 mL         Specimens:   Specimen (12h ago, onward)    Start     Ordered    04/15/19 1207  Specimen to Pathology - Surgery  Once     Comments:  1-Uterus, Cervix, Bilateral Tubes & Ovaries     Start Status     04/15/19 1207 Collected (04/15/19 1207) Order ID: [id number]       04/15/19 1207           PROCEDURE    Patient seen in pre op holding. Consents reviewed. Patient transferred to OR. General anesthesia was obtained and patient prepped (abdominally and vaginally) and draped in supine position. López placed.  Time out performed verifying procedure and patient.    A Pfannenstiel skin incision was made with the scalpel approximately 2 fingerbreadths above the symphysis pubis.  This was carried down sharply to the rectus fascia with a knife.  The fascia was entered sharply with the knife and extended bilaterally using pickups and Banegas scissors. Kocher clamps were placed at the superior aspect of the fascia,  and blunt and sharp dissection was used to separate the fascia from the underlying rectus muscles.  This was repeated on the inferior aspect of the fascia, as well.  The midline of the rectus were divided bluntly, and the peritoneum was identified using hemostats.  Blunt dissection was used to enter the peritoneal cavity.  Using care to avoid trauma to the underlying organs, the peritoneum was opened inferiorly and superiorly.  This allowed good exposure of the abdominal and pelvic cavity.       At this point, the bowel was packed away from the pelvis in order to increase visualization of the uterus, tubes and ovaries.  This packing was held in place with the O'Dan-O'Wilber retractor.  Care was used to pad the perimeter of the retractor to avoid tissue damage.    At this point, we began the hysterectomy portion of the case. The uterine cornua were grasped bilaterally with Vannessa clamps. The round ligaments were identified on either side.  Each round ligament was tied with a 0 Vicryl suture.  It was then transected with electrocautery medial to the ligation.  This was repeated on both sides.  Once entrance into the retroperitoneal space was verified, the incision was carried along the anterior and posterior broad ligaments bilaterally. The bladder flap was created. At this point, attempt to identify the ureters proved difficult due to adhesions. The left and right ovaries were densely adhered to the posterior uterus. The right ovary was managed to be manually dissected off and freed. On the right side an avascular window through the posterior leaf of the broad ligament was identified immediately below the IP and created avoiding the path of the ureter.  The infundibulopelvic ligament was then doubly clamped with Shavonne clamp lateral to the ovary and fallopian tube.  A pedicle was created with fowler scissors, and this was doubly ligated using two free tie of 0 Vicryl. The right UO ligament was then identified and  ligated and the right ovary, tube and IP removed with fowler scissors and handed to pathology. This allowed better visualization. The right broad ligament was dissected to skeletonize the right uterine vessels. The right ureter was palpated and found to be well below the pedicle of the IP.    The left broad ligament was opened up.  Left uretrolysis was then performed or about 20 minutes.  The ureter was dissected out and found to be following the medial leaf of the broad ligament. Given significant adhesions, ureterolysis all the way to the intersection point of the ureter and uterine artery near the cardinal ligament was performed. The left ovary remained densely adhered to the posterior uterus, with parts of the left ovarian and the rectum adhered to the posterior cervix. The IP was identified and avascular window found just below the ligament and above the ureter and opened. The IP ligament was doubly clamped, cut, suture ligated with 0-vicryl in same method described above.     The left broad ligament was opened and uterine vessels skeletonized.     At this point, the path of the uterine vessels were visualized bilaterally. Curved Shavonne clamps were used to clamp, incise, and ligate the uterine vessels at the level of the cervical internal os.   Each pedicle was doubly clamped, cut, and suture ligated with 0-Vicryl.  At this point, we evaluated our progress along the length of the cervix. The posterior cervix was densely adhered to rectum and unable tos safely dissect off.  Decision made to proceed with supracervical hysterectomy at the level near the internal os.  Bovie was used to excise the cervix, uterus and remaining left tube and ovary.   Bovie was used to cauterize the endocervical canal.  0-vicryl sutures placed in figure of 8 fashion to reapproximate the cervical stump.    The uterus, tubes and ovaries were then removed and handed off the table for submission to Pathology.        The remainder of the  pelvis was then observed for hemostasis. Standard techniques were used to ensure excellent hemostasis. The pelvis was irrigated with sterile saline.  Hemostasis was noted.  Ureters were peristalsising.   Arrista was placed throughout lower pelvis on all the pedicles.       After excellent hemostasis was noted throughout the pelvic cavity, the O Dan O jason device was removed.  The packing surrounding the bowels was removed, and effort was made to restore the bowels to their normal anatomical position.  The peritoneum was then closed in running fashion using a 2-0 Vicryl suture.  The rectus muscles were hemostatic.  The rectus fascia was closed with 1-0 PDS in a running fashion.  The subcutaneous tissues were then irrigated again.  Standard techniques were used for hemostasis at this level, as well.  A 2- 0 Vicryl suture was used to close Andres's fascia, in an attempt to decrease the dead space within the deep subcutaneous tissue. Finally, the skin was closed with subcuticular 4-0 monocryl in a running fashion.    Howie Mckeon MD  OB/GYN PGY-3  Pager: [phone]

## 2019-04-15 NOTE — ANESTHESIA POSTPROCEDURE EVALUATION
Anesthesia Post Evaluation    Patient: Deja Theodore    Procedure(s) Performed: Procedure(s):  HYSTERECTOMY, SUPRACERVICAL    Final Anesthesia Type: general  Patient location during evaluation: PACU  Patient participation: Yes- Able to Participate  Level of consciousness: oriented and awake  Post-procedure vital signs: reviewed and stable  Pain management: adequate  Airway patency: patent  PONV status at discharge: No PONV  Anesthetic complications: no      Cardiovascular status: hemodynamically stable  Respiratory status: unassisted, spontaneous ventilation and room air  Hydration status: euvolemic  Follow-up not needed.          Vitals Value Taken Time   /58 4/15/2019  3:20 PM   Temp 37 °C (98.6 °F) 4/15/2019  3:20 PM   Pulse 88 4/15/2019  3:20 PM   Resp 16 4/15/2019  3:20 PM   SpO2 100 % 4/15/2019  3:20 PM         Event Time     Out of Recovery 15:27:12          Pain/Yasir Score: Pain Rating Prior to Med Admin: 7 (4/15/2019  3:04 PM)  Pain Rating Post Med Admin: 5 (4/15/2019  3:20 PM)  Yasir Score: 9 (4/15/2019  3:20 PM)

## 2019-04-16 LAB
BASOPHILS # BLD AUTO: 0.01 K/UL (ref 0–0.2)
BASOPHILS # BLD AUTO: 0.02 K/UL (ref 0–0.2)
BASOPHILS NFR BLD: 0.2 % (ref 0–1.9)
BASOPHILS NFR BLD: 0.3 % (ref 0–1.9)
DIFFERENTIAL METHOD: ABNORMAL
DIFFERENTIAL METHOD: ABNORMAL
EOSINOPHIL # BLD AUTO: 0.1 K/UL (ref 0–0.5)
EOSINOPHIL # BLD AUTO: 0.2 K/UL (ref 0–0.5)
EOSINOPHIL NFR BLD: 1.3 % (ref 0–8)
EOSINOPHIL NFR BLD: 2.4 % (ref 0–8)
ERYTHROCYTE [DISTWIDTH] IN BLOOD BY AUTOMATED COUNT: 12.7 % (ref 11.5–14.5)
ERYTHROCYTE [DISTWIDTH] IN BLOOD BY AUTOMATED COUNT: 12.8 % (ref 11.5–14.5)
HCT VFR BLD AUTO: 25.9 % (ref 37–48.5)
HCT VFR BLD AUTO: 27 % (ref 37–48.5)
HGB BLD-MCNC: 8.7 G/DL (ref 12–16)
HGB BLD-MCNC: 9.1 G/DL (ref 12–16)
LYMPHOCYTES # BLD AUTO: 0.7 K/UL (ref 1–4.8)
LYMPHOCYTES # BLD AUTO: 0.8 K/UL (ref 1–4.8)
LYMPHOCYTES NFR BLD: 10.9 % (ref 18–48)
LYMPHOCYTES NFR BLD: 13.3 % (ref 18–48)
MCH RBC QN AUTO: 28.5 PG (ref 27–31)
MCH RBC QN AUTO: 28.5 PG (ref 27–31)
MCHC RBC AUTO-ENTMCNC: 33.6 G/DL (ref 32–36)
MCHC RBC AUTO-ENTMCNC: 33.7 G/DL (ref 32–36)
MCV RBC AUTO: 85 FL (ref 82–98)
MCV RBC AUTO: 85 FL (ref 82–98)
MONOCYTES # BLD AUTO: 0.4 K/UL (ref 0.3–1)
MONOCYTES # BLD AUTO: 0.4 K/UL (ref 0.3–1)
MONOCYTES NFR BLD: 6.8 % (ref 4–15)
MONOCYTES NFR BLD: 7 % (ref 4–15)
NEUTROPHILS # BLD AUTO: 4.8 K/UL (ref 1.8–7.7)
NEUTROPHILS # BLD AUTO: 5.1 K/UL (ref 1.8–7.7)
NEUTROPHILS NFR BLD: 76.9 % (ref 38–73)
NEUTROPHILS NFR BLD: 80.7 % (ref 38–73)
PLATELET # BLD AUTO: 122 K/UL (ref 150–350)
PLATELET # BLD AUTO: 127 K/UL (ref 150–350)
PMV BLD AUTO: 11.7 FL (ref 9.2–12.9)
PMV BLD AUTO: 12.6 FL (ref 9.2–12.9)
RBC # BLD AUTO: 3.05 M/UL (ref 4–5.4)
RBC # BLD AUTO: 3.19 M/UL (ref 4–5.4)
WBC # BLD AUTO: 6.26 K/UL (ref 3.9–12.7)
WBC # BLD AUTO: 6.31 K/UL (ref 3.9–12.7)

## 2019-04-16 PROCEDURE — 99024 POSTOP FOLLOW-UP VISIT: CPT | Mod: ,,, | Performed by: OBSTETRICS & GYNECOLOGY

## 2019-04-16 PROCEDURE — 99024 PR POST-OP FOLLOW-UP VISIT: ICD-10-PCS | Mod: ,,, | Performed by: OBSTETRICS & GYNECOLOGY

## 2019-04-16 PROCEDURE — 85025 COMPLETE CBC W/AUTO DIFF WBC: CPT | Mod: 91

## 2019-04-16 PROCEDURE — 25000003 PHARM REV CODE 250: Performed by: STUDENT IN AN ORGANIZED HEALTH CARE EDUCATION/TRAINING PROGRAM

## 2019-04-16 PROCEDURE — C9113 INJ PANTOPRAZOLE SODIUM, VIA: HCPCS | Performed by: STUDENT IN AN ORGANIZED HEALTH CARE EDUCATION/TRAINING PROGRAM

## 2019-04-16 PROCEDURE — 11000001 HC ACUTE MED/SURG PRIVATE ROOM

## 2019-04-16 PROCEDURE — 63600175 PHARM REV CODE 636 W HCPCS: Performed by: STUDENT IN AN ORGANIZED HEALTH CARE EDUCATION/TRAINING PROGRAM

## 2019-04-16 PROCEDURE — 25000003 PHARM REV CODE 250: Performed by: OBSTETRICS & GYNECOLOGY

## 2019-04-16 PROCEDURE — 99238 HOSP IP/OBS DSCHRG MGMT 30/<: CPT | Mod: ,,, | Performed by: OBSTETRICS & GYNECOLOGY

## 2019-04-16 PROCEDURE — 36415 COLL VENOUS BLD VENIPUNCTURE: CPT

## 2019-04-16 PROCEDURE — 99238 PR HOSPITAL DISCHARGE DAY,<30 MIN: ICD-10-PCS | Mod: ,,, | Performed by: OBSTETRICS & GYNECOLOGY

## 2019-04-16 PROCEDURE — 94799 UNLISTED PULMONARY SVC/PX: CPT

## 2019-04-16 PROCEDURE — 94761 N-INVAS EAR/PLS OXIMETRY MLT: CPT

## 2019-04-16 RX ORDER — BUTALBITAL, ACETAMINOPHEN AND CAFFEINE 50; 325; 40 MG/1; MG/1; MG/1
1 TABLET ORAL EVERY 4 HOURS PRN
Status: DISCONTINUED | OUTPATIENT
Start: 2019-04-16 | End: 2019-04-17 | Stop reason: HOSPADM

## 2019-04-16 RX ORDER — SIMETHICONE 80 MG
1 TABLET,CHEWABLE ORAL 3 TIMES DAILY PRN
Status: DISCONTINUED | OUTPATIENT
Start: 2019-04-16 | End: 2019-04-17 | Stop reason: HOSPADM

## 2019-04-16 RX ORDER — HYDROCODONE BITARTRATE AND ACETAMINOPHEN 5; 325 MG/1; MG/1
1 TABLET ORAL EVERY 6 HOURS PRN
Qty: 30 TABLET | Refills: 0 | Status: SHIPPED | OUTPATIENT
Start: 2019-04-16 | End: 2019-05-15

## 2019-04-16 RX ADMIN — OXYCODONE AND ACETAMINOPHEN 1 TABLET: 5; 325 TABLET ORAL at 02:04

## 2019-04-16 RX ADMIN — ONDANSETRON 8 MG: 8 TABLET, ORALLY DISINTEGRATING ORAL at 12:04

## 2019-04-16 RX ADMIN — KETOROLAC TROMETHAMINE 30 MG: 30 INJECTION, SOLUTION INTRAMUSCULAR at 05:04

## 2019-04-16 RX ADMIN — SERTRALINE HYDROCHLORIDE 150 MG: 100 TABLET ORAL at 09:04

## 2019-04-16 RX ADMIN — STANDARDIZED SENNA CONCENTRATE AND DOCUSATE SODIUM 1 TABLET: 8.6; 5 TABLET, FILM COATED ORAL at 09:04

## 2019-04-16 RX ADMIN — PROMETHAZINE HYDROCHLORIDE 12.5 MG: 25 INJECTION INTRAMUSCULAR; INTRAVENOUS at 04:04

## 2019-04-16 RX ADMIN — KETOROLAC TROMETHAMINE 30 MG: 30 INJECTION, SOLUTION INTRAMUSCULAR at 12:04

## 2019-04-16 RX ADMIN — OXYCODONE AND ACETAMINOPHEN 1 TABLET: 5; 325 TABLET ORAL at 06:04

## 2019-04-16 RX ADMIN — DEXTROSE, SODIUM CHLORIDE, AND POTASSIUM CHLORIDE: 5; .45; .15 INJECTION INTRAVENOUS at 09:04

## 2019-04-16 RX ADMIN — OXYCODONE AND ACETAMINOPHEN 1 TABLET: 5; 325 TABLET ORAL at 09:04

## 2019-04-16 RX ADMIN — IBUPROFEN 600 MG: 600 TABLET ORAL at 06:04

## 2019-04-16 RX ADMIN — IBUPROFEN 600 MG: 600 TABLET ORAL at 11:04

## 2019-04-16 RX ADMIN — MUPIROCIN 1 G: 20 OINTMENT TOPICAL at 09:04

## 2019-04-16 RX ADMIN — STANDARDIZED SENNA CONCENTRATE AND DOCUSATE SODIUM 1 TABLET: 8.6; 5 TABLET, FILM COATED ORAL at 08:04

## 2019-04-16 RX ADMIN — OXYCODONE AND ACETAMINOPHEN 1 TABLET: 5; 325 TABLET ORAL at 10:04

## 2019-04-16 RX ADMIN — MUPIROCIN 1 G: 20 OINTMENT TOPICAL at 08:04

## 2019-04-16 RX ADMIN — SIMETHICONE CHEW TAB 80 MG 80 MG: 80 TABLET ORAL at 06:04

## 2019-04-16 RX ADMIN — PANTOPRAZOLE SODIUM 40 MG: 40 INJECTION, POWDER, FOR SOLUTION INTRAVENOUS at 05:04

## 2019-04-16 NOTE — PLAN OF CARE
Problem: Adult Inpatient Plan of Care  Goal: Plan of Care Review  Outcome: Ongoing (interventions implemented as appropriate)  Patient ambulating often today independently. Voiding without difficulty. Tolerating PO. VSS, patient on room air. Pain is well controlled with PO medication. Dressing to abdomen changed today, patient tolerated well. Family at bedside for support. Patient with swelling to left lower lip still present. Patient remained free of falls throughout shift. All questions and concerns addressed throughout shift. Patient anticipating discharge home tomorrow.

## 2019-04-16 NOTE — NURSING
Called to let resident on call know about H&H 8.7/25.9. Patient not symptomatic and states she feels well.     Also rec'd order for home sertraline dose to change.

## 2019-04-16 NOTE — PLAN OF CARE
CM met with pt for initial discharge planning assessment.    Pt lives alone, but her sister will be with her after surgery.    Pharmacy of choice is [facility] on [address].    Pt denies history of mental issues, is independent and states she will likely have no CM needs for discharge.    CM to follow for plans and arrangements.     04/16/19 0952   Discharge Assessment   Assessment Type Discharge Planning Assessment   Confirmed/corrected address and phone number on facesheet? Yes   Assessment information obtained from? Patient;Medical Record   Expected Length of Stay (days) 3   Prior to hospitilization cognitive status: Alert/Oriented   Prior to hospitalization functional status: Independent   Current cognitive status: Alert/Oriented   Current Functional Status: Independent   Lives With alone   Able to Return to Prior Arrangements yes   Is patient able to care for self after discharge? Yes  (Pt's sisters are here to assist her after surgery.)   Who are your caregiver(s) and their phone number(s)? Yokasta Huynh, sister, [phone]   Patient's perception of discharge disposition home or selfcare   Readmission Within the Last 30 Days no previous admission in last 30 days   Patient currently being followed by outpatient case management? No   Patient currently receives any other outside agency services? No   Equipment Currently Used at Home none   Do you have any problems affording any of your prescribed medications? No   Is the patient taking medications as prescribed? yes   Does the patient have transportation home? Yes   Transportation Anticipated family or friend will provide   Does the patient receive services at the [facility]? No   Discharge Plan A Home   Discharge Plan B Home   DME Needed Upon Discharge  none   Patient/Family in Agreement with Plan yes

## 2019-04-16 NOTE — PLAN OF CARE
Problem: Adult Inpatient Plan of Care  Goal: Plan of Care Review  Outcome: Ongoing (interventions implemented as appropriate)  Patient pain well controlled throughout shift. VSS. Patient tolerating PO but with minimal appetite. Patient remained free of falls throughout shift. Family at bedside for support. All questions and concerns addressed. López in place. No complaints N/V. Purposeful hourly rounding complete.

## 2019-04-16 NOTE — PLAN OF CARE
Problem: Infection  Goal: Infection Symptom Resolution  Outcome: Ongoing (interventions implemented as appropriate)  Pt assessed for signs/symptoms of infection, pt encouraged to notify nurse of any new or worsening fever/chills and of any reddness/warmth and drainage of surgical site(s), vitals assessed per protocol, plan of care discussed with pt, understanding verbalized, no acute events during the night, plan of care reviewed with pt and pt verbalized understanding, and will continue to monitor

## 2019-04-16 NOTE — NURSING
"Pt notified nurse stating that " I don't feel good", pt expressed nausea and headache and abdominal pressure, abdomen rounded as per baseline, prn n/v medication administered and scheduled pain medication as ordered, MD on call page to notify, awaiting call back, will continue to monitor.  "

## 2019-04-16 NOTE — PROGRESS NOTES
[facility]  Obstetrics & Gynecology  Progress Note    Patient Name: Deja Theodore  MRN: [id number]  Admission Date: 4/15/2019  Primary Care Provider: Fransisco Sepulveda MD  Principal Problem: Endometriosis determined by laparoscopy    Subjective:     Interval History:   POD #1 s/p supracervical abdominal hysterectomy/BSO/ ROB 2/2 chronic pelvic pain, endometriosis    Primarily, patient reports swelling over left lower lip which she believes is attributed to anesthesia trauma with ET tube. Additionally, she has a history of headaches and experienced some symptoms overnight. She reports mild to moderate pain adequately controlled by PO pain medications - Percocet 10 mg x2. Overnight, patient ate multiple crackers and drank water all at once, which she attributes to her nausea without vomiting. Her symptoms resolved with PRN medications. No CP/SOB. She denies flatus. Urinary catheter in place. Patient has not yet ambulated. Denies vaginal bleeding.     Scheduled Meds:   ibuprofen  600 mg Oral Q6H    ketorolac  30 mg Intravenous Q6H    mupirocin  1 g Nasal BID    pantoprazole  40 mg Intravenous Before breakfast    senna-docusate 8.6-50 mg  1 tablet Oral BID    sertraline  100 mg Oral Daily     Continuous Infusions:   dextrose 5 % and 0.45 % NaCl with KCl 20 mEq 125 mL/hr at 04/15/19 2358     PRN Meds:bisacodyl, diphenhydrAMINE, diphenhydrAMINE, HYDROmorphone, ondansetron, oxyCODONE-acetaminophen, oxyCODONE-acetaminophen, promethazine (PHENERGAN) IVPB, sodium chloride 0.9%    Review of patient's allergies indicates:   Allergen Reactions    No known allergies        Objective:     Vital Signs (Most Recent):  Temp: 98.1 °F (36.7 °C) (04/16/19 0416)  Pulse: 73 (04/16/19 0416)  Resp: 17 (04/16/19 0416)  BP: (!) 99/55 (04/16/19 0416)  SpO2: 99 % (04/16/19 0416) Vital Signs (24h Range):  Temp:  [98 °F (36.7 °C)-98.7 °F (37.1 °C)] 98.1 °F (36.7 °C)  Pulse:  [] 73  Resp:  [16-18] 17  SpO2:  [97  %-100 %] 99 %  BP: ()/(55-68) 99/55     Weight: 49.9 kg (110 lb)  Body mass index is 20.12 kg/m².  Patient's last menstrual period was 02/27/2019.    I&O (Last 24H):    Intake/Output Summary (Last 24 hours) at 4/16/2019 0545  Last data filed at 4/16/2019 0417  Gross per 24 hour   Intake 4702.92 ml   Output 2575 ml   Net 2127.92 ml       Physical Exam:   Constitutional: She is oriented to person, place, and time. She appears well-developed and well-nourished. No distress.    HENT:   Head: Normocephalic and atraumatic.   Nose: Nose normal.   Swelling of left lower lip        Cardiovascular: Normal rate, regular rhythm and intact distal pulses.     Pulmonary/Chest: Effort normal and breath sounds normal. No respiratory distress.        Abdominal: Soft. She exhibits distension (Most likely secondary to gas ). Abdominal incision: Incision clean/dry/intact. There is no tenderness (Appropriate post operative tenderness). There is no rebound and no guarding.   Hypoactive bowel sounds                   Neurological: She is alert and oriented to person, place, and time. No cranial nerve deficit.    Skin: Skin is warm and dry. She is not diaphoretic.    Psychiatric: She has a normal mood and affect. Her behavior is normal. Judgment and thought content normal.     UOP: 1.79 cc/kg/hr    Assessment/Plan:     Active Diagnoses:    Diagnosis Date Noted POA    PRINCIPAL PROBLEM:  Endometriosis determined by laparoscopy [N80.9] 03/18/2019 Yes    Endometriosis [N80.9] 04/15/2019 Yes    S/P abdominal supracervical subtotal hysterectomy [Z90.711] 04/15/2019 No    Left ovarian cyst [N83.202] 03/13/2019 Yes      Problems Resolved During this Admission:     1. Postop Day #1 s/p supracervical abdominal hysterectomy/BSO 2/2 chronic pelvic pain, endometriosis   - continue routine postop care.   - continue pain control with percocet/Toradol>Ibuprofen Dilaudid prn for breakthrough  - continue regular diet. IVFs (continue dextrose 5%  and 0.45% NaCl w/ KCl 20 mEq tolerating PO).  - ambulate TID. IS bedside.  - Razo in place. Will discontinue razo in am with passive VT.   - Encouraged use of ice packs of left lower lip to decrease swelling. Most likely secondary to intubation tube placement during surgery   - F/u H/H in the AM    2. GERD  - Continue pantoprazole 40 mg before breakfast    3. Depression  - Stable  - Continue sertraline 100 mg daily    4. Migraines  - History of migraines  - Fioricet PRN    Leticia Ramachandran MD  Obstetrics & Gynecology  [facility]    Agree with above. Anticipate spontaneous void. Discussed advancing diet slowly this morning. Encouraged ambulation. Anticipate discharge POD#2-3.  Samira Valladares M.D.  PGY-4 ObGyn  [phone]

## 2019-04-16 NOTE — NURSING
Spoke with MD Aleman, notified of all findings (see previous nursing note) including decreased total urine output, MD will round shortly to assess the patient, no new orders given will continue to monitor.

## 2019-04-16 NOTE — DISCHARGE SUMMARY
[facility]  Obstetrics & Gynecology  Discharge Summary    Patient Name: Deja Theodore  MRN: [id number]  Admission Date: 4/15/2019  Hospital Length of Stay: 2 days  Discharge Date and Time:  04/17/2019   Attending Physician: Peggy Carrero MD   Discharging Provider: Leticia Ramachandran MD  Primary Care Provider: Fransisco Sepulveda MD    Hospital Course:   Patient presented for scheduled procedure. Patient was passed back to OR for supracervical abdominal hysterectomy/BSO 2/2 chronic pelvic pain and endometriosis. Please see OP note for further details. Tolerated procedure well and patient was taken to recovery in a stable condition. On HD#2, patient was able to void, ambulate, tolerate PO and pain was well controlled with PO meds. Patient was given routine post-op instructions for which patient voiced understanding. Patient was subsequently discharged home.    Procedure(s):  HYSTERECTOMY, SUPRACERVICAL     Pending Diagnostic Studies:     Procedure Component Value Units Date/Time    CBC auto differential [[id number]] Collected:  04/16/19 0455    Order Status:  Sent Lab Status:  In process Updated:  04/16/19 0604    Specimen:  Blood         Final Active Diagnoses:    Diagnosis Date Noted POA    PRINCIPAL PROBLEM:  Endometriosis determined by laparoscopy [N80.9] 03/18/2019 Yes    Endometriosis [N80.9] 04/15/2019 Yes    S/P abdominal supracervical subtotal hysterectomy [Z90.711] 04/15/2019 No    Left ovarian cyst [N83.202] 03/13/2019 Yes      Problems Resolved During this Admission:        Discharged Condition: good    Disposition: home     Patient Instructions:      Diet Adult Regular     Notify your health care provider if you experience any of the following:  temperature >100.4     Notify your health care provider if you experience any of the following:  persistent nausea and vomiting or diarrhea     Notify your health care provider if you experience any of the following:  severe  uncontrolled pain     Notify your health care provider if you experience any of the following:  redness, tenderness, or signs of infection (pain, swelling, redness, odor or green/yellow discharge around incision site)     Notify your health care provider if you experience any of the following:  severe persistent headache     Notify your health care provider if you experience any of the following:  difficulty breathing or increased cough     Notify your health care provider if you experience any of the following:  worsening rash     Notify your health care provider if you experience any of the following:  persistent dizziness, light-headedness, or visual disturbances     Notify your health care provider if you experience any of the following:  increased confusion or weakness     Notify your health care provider if you experience any of the following:   Order Comments: Notify MD if bleeding 1 pad/hour for 2 consecutive hours.     No dressing needed     Activity as tolerated   Order Comments: Pelvic rest until cleared by MD. Nothing in vagina till cleared by MD including tampons, douching, intercourse  Do not lift anything >10 lbs over the next 2 weeks.     Medications:  Reconciled Home Medications:      Medication List      START taking these medications    HYDROcodone-acetaminophen 5-325 mg per tablet  Commonly known as:  NORCO  Take 1 tablet by mouth every 6 (six) hours as needed.        CHANGE how you take these medications    * ibuprofen 800 MG tablet  Commonly known as:  ADVIL,MOTRIN  Take 1 tablet (800 mg total) by mouth every 8 (eight) hours as needed for Pain.  What changed:  Another medication with the same name was added. Make sure you understand how and when to take each.     * ibuprofen 600 MG tablet  Commonly known as:  ADVIL,MOTRIN  Take 1 tablet (600 mg total) by mouth every 6 (six) hours as needed.  What changed:  You were already taking a medication with the same name, and this prescription was added.  Make sure you understand how and when to take each.         * This list has 2 medication(s) that are the same as other medications prescribed for you. Read the directions carefully, and ask your doctor or other care provider to review them with you.            CONTINUE taking these medications    hydrOXYzine pamoate 25 MG Cap  Commonly known as:  VISTARIL  TK 1 TO 2 CS PO QHS PRF INSOMNIA     urea 10 % Lotn  Apply 1 application topically once daily. To dry skin on the feet.     ZOLOFT ORAL  Take 150 mg by mouth once daily.            Leticia Ramachandran MD  Obstetrics & Gynecology  [facility]

## 2019-04-17 ENCOUNTER — PATIENT MESSAGE (OUTPATIENT)
Dept: SURGERY | Facility: OTHER | Age: 49
End: 2019-04-17

## 2019-04-17 VITALS
OXYGEN SATURATION: 97 % | HEIGHT: 62 IN | RESPIRATION RATE: 18 BRPM | BODY MASS INDEX: 20.24 KG/M2 | WEIGHT: 110 LBS | SYSTOLIC BLOOD PRESSURE: 98 MMHG | DIASTOLIC BLOOD PRESSURE: 56 MMHG | HEART RATE: 81 BPM | TEMPERATURE: 98 F

## 2019-04-17 PROCEDURE — 94761 N-INVAS EAR/PLS OXIMETRY MLT: CPT

## 2019-04-17 PROCEDURE — 63600175 PHARM REV CODE 636 W HCPCS: Performed by: STUDENT IN AN ORGANIZED HEALTH CARE EDUCATION/TRAINING PROGRAM

## 2019-04-17 PROCEDURE — C9113 INJ PANTOPRAZOLE SODIUM, VIA: HCPCS | Performed by: STUDENT IN AN ORGANIZED HEALTH CARE EDUCATION/TRAINING PROGRAM

## 2019-04-17 PROCEDURE — 25000003 PHARM REV CODE 250: Performed by: OBSTETRICS & GYNECOLOGY

## 2019-04-17 PROCEDURE — 25000003 PHARM REV CODE 250: Performed by: STUDENT IN AN ORGANIZED HEALTH CARE EDUCATION/TRAINING PROGRAM

## 2019-04-17 RX ORDER — IBUPROFEN 600 MG/1
600 TABLET ORAL EVERY 6 HOURS PRN
Qty: 30 TABLET | Refills: 1 | Status: SHIPPED | OUTPATIENT
Start: 2019-04-17 | End: 2019-05-15 | Stop reason: SDUPTHER

## 2019-04-17 RX ADMIN — MUPIROCIN 1 G: 20 OINTMENT TOPICAL at 09:04

## 2019-04-17 RX ADMIN — PANTOPRAZOLE SODIUM 40 MG: 40 INJECTION, POWDER, FOR SOLUTION INTRAVENOUS at 06:04

## 2019-04-17 RX ADMIN — OXYCODONE AND ACETAMINOPHEN 1 TABLET: 5; 325 TABLET ORAL at 03:04

## 2019-04-17 RX ADMIN — SERTRALINE HYDROCHLORIDE 150 MG: 100 TABLET ORAL at 09:04

## 2019-04-17 RX ADMIN — IBUPROFEN 600 MG: 600 TABLET ORAL at 05:04

## 2019-04-17 RX ADMIN — STANDARDIZED SENNA CONCENTRATE AND DOCUSATE SODIUM 1 TABLET: 8.6; 5 TABLET, FILM COATED ORAL at 09:04

## 2019-04-17 NOTE — NURSING
Eager & in agreement w/ DC. VU of DC instructions--paperwork & prescriptions passed & explained.  IV removed w/ cath tip intact, WNL. To be DCd home w/ family-- will be escorted downstairs via  transport team once dressed, ready & ride arrives. Free from falls, injury, or skin breakdown this hospital admission.

## 2019-04-17 NOTE — PLAN OF CARE
Problem: Adult Inpatient Plan of Care  Goal: Plan of Care Review  Outcome: Ongoing (interventions implemented as appropriate)  Pt remained free from falls or injuries this shift. Pt independent in repositioning. No skin breakdown noticed. Pt rested well through the night. Ambulated several times to restroom. Pain well controlled w prn percocets. Pt reports she has started to pass gas

## 2019-04-17 NOTE — PLAN OF CARE
Pt discharging home today after staff rounds.    Family to transport home.    Pt confirms no CM needs for discharge.     04/17/19 1021   Final Note   Assessment Type Final Discharge Note   Anticipated Discharge Disposition Home   What phone number can be called within the next 1-3 days to see how you are doing after discharge? [phone]   Hospital Follow Up  Appt(s) scheduled? Yes   Discharge plans and expectations educations in teach back method with documentation complete? Yes   Right Care Referral Info   Post Acute Recommendation No Care

## 2019-04-17 NOTE — PROGRESS NOTES
[facility]  Obstetrics & Gynecology  Progress Note    Patient Name: Deja Theodore  MRN: [id number]  Admission Date: 4/15/2019  Primary Care Provider: Fransisco Sepulveda MD  Principal Problem: S/P abdominal supracervical subtotal hysterectomy    Subjective:     Interval History:   POD #2 s/p supracervical abdominal hysterectomy/BSO 2/2 chronic pelvic pain, endometriosis    She reports mild to moderate pain adequately controlled by PO pain medications - Percocet 5 mg x3 overnight. She has been able to tolerate PO without any nausea or vomiting. No CP/SOB. She reports flatus and is voiding spontaneously. Patient has been ambulating in the halls and in her room. Denies vaginal bleeding.     Scheduled Meds:   ibuprofen  600 mg Oral Q6H    mupirocin  1 g Nasal BID    pantoprazole  40 mg Intravenous Before breakfast    senna-docusate 8.6-50 mg  1 tablet Oral BID    sertraline  150 mg Oral Daily     Continuous Infusions:   dextrose 5 % and 0.45 % NaCl with KCl 20 mEq Stopped (04/16/19 1248)     PRN Meds:bisacodyl, butalbital-acetaminophen-caffeine -40 mg, diphenhydrAMINE, diphenhydrAMINE, HYDROmorphone, ondansetron, oxyCODONE-acetaminophen, oxyCODONE-acetaminophen, promethazine (PHENERGAN) IVPB, simethicone, sodium chloride 0.9%    Review of patient's allergies indicates:   Allergen Reactions    No known allergies        Objective:     Vital Signs (Most Recent):  Temp: 98.1 °F (36.7 °C) (04/17/19 0309)  Pulse: 82 (04/17/19 0309)  Resp: 16 (04/17/19 0309)  BP: (!) 110/57 (04/17/19 0309)  SpO2: 98 % (04/17/19 0309) Vital Signs (24h Range):  Temp:  [97.4 °F (36.3 °C)-98.4 °F (36.9 °C)] 98.1 °F (36.7 °C)  Pulse:  [73-92] 82  Resp:  [14-17] 16  SpO2:  [96 %-99 %] 98 %  BP: ()/(49-57) 110/57     Weight: 49.9 kg (110 lb)  Body mass index is 20.12 kg/m².  Patient's last menstrual period was 02/27/2019.    I&O (Last 24H):    Intake/Output Summary (Last 24 hours) at 4/17/2019 0340  Last data  filed at 4/16/2019 1818  Gross per 24 hour   Intake 3514.58 ml   Output 3175 ml   Net 339.58 ml       Physical Exam:   Constitutional: She is oriented to person, place, and time. She appears well-developed and well-nourished. No distress.    HENT:   Head: Normocephalic and atraumatic.   Nose: Nose normal.   Swelling of left lower lip        Cardiovascular: Normal rate, regular rhythm and intact distal pulses.     Pulmonary/Chest: Effort normal and breath sounds normal. No respiratory distress.        Abdominal: Soft. Bowel sounds are normal. She exhibits no distension (Most likely secondary to gas ). Abdominal incision: Incision clean/dry/intact. There is no tenderness (Appropriate post operative tenderness). There is no rebound and no guarding.                 Neurological: She is alert and oriented to person, place, and time. No cranial nerve deficit.    Skin: Skin is warm and dry. She is not diaphoretic.    Psychiatric: She has a normal mood and affect. Her behavior is normal. Judgment and thought content normal.     UOP: 1.6 cc/kg/hr of measured urine output, per nursing staff, patient has voided at other times which were not measured    Assessment/Plan:     Active Diagnoses:    Diagnosis Date Noted POA    PRINCIPAL PROBLEM:  S/P abdominal supracervical subtotal hysterectomy [Z90.711] 04/15/2019 No    Endometriosis [N80.9] 04/15/2019 Yes    Endometriosis determined by laparoscopy [N80.9] 03/18/2019 Yes    Left ovarian cyst [N83.202] 03/13/2019 Yes      Problems Resolved During this Admission:     1. Postop Day #1 s/p supracervical abdominal hysterectomy/BSO 2/2 chronic pelvic pain, endometriosis   - continue routine postop care.   - continue pain control with percocet/Toradol>Ibuprofen Dilaudid prn for breakthrough  - continue regular diet. IVF discontinued since patient tolerating PO  - ambulate TID. IS bedside.  - Urinating without difficulty  - Lip swelling has decreased with use of ice packs and witch  desean (which patient's sister brought for her)    2. GERD  - Continue pantoprazole 40 mg before breakfast    3. Depression  - Stable  - Continue sertraline 150 mg daily    4. Migraines  - History of migraines  - Fioricet PRN    Dispo: as patient is meeting all post operative milestones, anticipate discharge home later today    Leticia Ramachandran MD  Obstetrics & Gynecology  [facility]

## 2019-04-24 ENCOUNTER — PATIENT MESSAGE (OUTPATIENT)
Dept: OBSTETRICS AND GYNECOLOGY | Facility: CLINIC | Age: 49
End: 2019-04-24

## 2019-05-15 ENCOUNTER — OFFICE VISIT (OUTPATIENT)
Dept: OBSTETRICS AND GYNECOLOGY | Facility: CLINIC | Age: 49
End: 2019-05-15
Attending: OBSTETRICS & GYNECOLOGY
Payer: COMMERCIAL

## 2019-05-15 VITALS
HEIGHT: 62 IN | DIASTOLIC BLOOD PRESSURE: 80 MMHG | WEIGHT: 112.44 LBS | BODY MASS INDEX: 20.69 KG/M2 | SYSTOLIC BLOOD PRESSURE: 120 MMHG

## 2019-05-15 DIAGNOSIS — Z09 POSTOP CHECK: Primary | ICD-10-CM

## 2019-05-15 PROCEDURE — 99024 POSTOP FOLLOW-UP VISIT: CPT | Mod: S$GLB,,, | Performed by: OBSTETRICS & GYNECOLOGY

## 2019-05-15 PROCEDURE — 99024 PR POST-OP FOLLOW-UP VISIT: ICD-10-PCS | Mod: S$GLB,,, | Performed by: OBSTETRICS & GYNECOLOGY

## 2019-05-15 PROCEDURE — 99999 PR PBB SHADOW E&M-EST. PATIENT-LVL III: CPT | Mod: PBBFAC,,, | Performed by: OBSTETRICS & GYNECOLOGY

## 2019-05-15 PROCEDURE — 99999 PR PBB SHADOW E&M-EST. PATIENT-LVL III: ICD-10-PCS | Mod: PBBFAC,,, | Performed by: OBSTETRICS & GYNECOLOGY

## 2019-05-15 RX ORDER — IBUPROFEN 600 MG/1
600 TABLET ORAL EVERY 6 HOURS PRN
Qty: 30 TABLET | Refills: 1 | Status: SHIPPED | OUTPATIENT
Start: 2019-05-15 | End: 2019-06-06 | Stop reason: SDUPTHER

## 2019-05-15 RX ORDER — ESTRADIOL AND NORETHINDRONE ACETATE 1; .5 MG/1; MG/1
1 TABLET ORAL DAILY
Qty: 30 TABLET | Refills: 11 | Status: SHIPPED | OUTPATIENT
Start: 2019-05-15 | End: 2019-06-18

## 2019-05-15 NOTE — OPERATIVE NOTE ADDENDUM
Certification of Assistant at Surgery       Surgery Date: 4/15/2019     Participating Surgeons:  Surgeon(s) and Role:     * Peggy Carrero MD - Primary     * Hira Raya IV, MD - Assisting    Procedures:  Procedure(s):  HYSTERECTOMY, SUPRACERVICAL    Assistant Surgeon's Certification of Necessity:  I understand that section 1842 (b) (6) (d) of the Social Security Act generally prohibits Medicare Part B reasonable charge payment for the services of assistants at surgery in teaching hospitals when qualified residents are available to furnish such services. I certify that the services for which payment is claimed were medically necessary, and that no qualified resident was available to perform the services. I further understand that these services are subject to post-payment review by the Medicare carrier.      Hira Raya Iv, MD    05/15/2019  5:05 PM

## 2019-05-19 PROBLEM — Z90.711 S/P ABDOMINAL SUPRACERVICAL SUBTOTAL HYSTERECTOMY: Status: RESOLVED | Noted: 2019-04-15 | Resolved: 2019-05-19

## 2019-05-19 PROBLEM — N80.9 ENDOMETRIOSIS DETERMINED BY LAPAROSCOPY: Status: RESOLVED | Noted: 2019-03-18 | Resolved: 2019-05-19

## 2019-05-19 PROBLEM — R10.2 FEMALE PELVIC PAIN: Status: RESOLVED | Noted: 2019-03-15 | Resolved: 2019-05-19

## 2019-05-19 PROBLEM — N83.202 LEFT OVARIAN CYST: Status: RESOLVED | Noted: 2019-03-13 | Resolved: 2019-05-19

## 2019-05-19 PROBLEM — K82.4 GALLBLADDER POLYP: Status: RESOLVED | Noted: 2019-03-18 | Resolved: 2019-05-19

## 2019-05-19 NOTE — PROGRESS NOTES
SUBJECTIVE:   49 y.o. female   for post op gyn exam. No LMP recorded..  She had supracervical hyst/ BSO for endometriosis.  She reports improved pain.  No bleeding.  + Hot flashes.  H/o migraines that were worsened with OCP's.         Past Medical History:   Diagnosis Date    Anemia     Arthritis     Carpal tunnel syndrome     Depression     Keratitis secondary to contact lens     OD    Other disorders of eyelid(374.89)     MGD    Spondylolisthesis      Past Surgical History:   Procedure Laterality Date    ADENOIDECTOMY      HYSTERECTOMY      HYSTERECTOMY, SUPRACERVICAL  4/15/2019    [facility]    LAPAROSCOPY, DIAGNOSTIC N/A 3/18/2019    [facility]formed by [facility] at [facility] OR    NOSE SURGERY      Age 18-cosmetic    TONSILLECTOMY       Social History     Socioeconomic History    Marital status: Legally      Spouse name: Not on file    Number of children: 2    Years of education: Not on file    Highest education level: Not on file   Occupational History     Employer: not employed   Social Needs    Financial resource strain: Not on file    Food insecurity:     Worry: Not on file     Inability: Not on file    Transportation needs:     Medical: Not on file     Non-medical: Not on file   Tobacco Use    Smoking status: Former Smoker     Packs/day: 0.30     Years: 10.00     Pack years: 3.00     Types: Cigarettes     Last attempt to quit: 2005     Years since quittin.6    Smokeless tobacco: Never Used   Substance and Sexual Activity    Alcohol use: Not Currently     Comment: Rare    Drug use: No    Sexual activity: Not Currently     Partners: Male     Birth control/protection: None   Lifestyle    Physical activity:     Days per week: Not on file     Minutes per session: Not on file    Stress: Not on file   Relationships    Social connections:     Talks on phone: Not on file     Gets together: Not on file     Attends  Orthodox service: Not on file     Active member of club or organization: Not on file     Attends meetings of clubs or organizations: Not on file     Relationship status: Not on file   Other Topics Concern    Not on file   Social History Narrative    Not on file     Family History   Problem Relation Age of Onset    Depression Mother     Mental illness Mother     Hypertension Mother     Hypertension Father     Cancer Sister     Asthma Brother     Mental illness Brother     Asthma Son     Amblyopia Neg Hx     Blindness Neg Hx     Cataracts Neg Hx     Diabetes Neg Hx     Glaucoma Neg Hx     Macular degeneration Neg Hx     Retinal detachment Neg Hx     Strabismus Neg Hx     Stroke Neg Hx     Thyroid disease Neg Hx     Breast cancer Neg Hx      OB History    Para Term  AB Living   1 1 1 0   1   SAB TAB Ectopic Multiple Live Births           1      # Outcome Date GA Lbr Khurram/2nd Weight Sex Delivery Anes PTL Lv   1 Term 11/    M Vag-Spont   ABUNDIO         Current Outpatient Medications   Medication Sig Dispense Refill    hydrOXYzine pamoate (VISTARIL) 25 MG Cap TK 1 TO 2 CS PO QHS PRF INSOMNIA  0    ibuprofen (ADVIL,MOTRIN) 600 MG tablet Take 1 tablet (600 mg total) by mouth every 6 (six) hours as needed. 30 tablet 1    ibuprofen (ADVIL,MOTRIN) 800 MG tablet Take 1 tablet (800 mg total) by mouth every 8 (eight) hours as needed for Pain. 30 tablet 0    sertraline HCl (ZOLOFT ORAL) Take 150 mg by mouth once daily.      urea 10 % Lotn Apply 1 application topically once daily. To dry skin on the feet. 177 mL 10    estradiol-norethindrone (ACTIVELLA) 1-0.5 mg per tablet Take 1 tablet by mouth once daily. 30 tablet 11     No current facility-administered medications for this visit.      Allergies: No known allergies     ROS:  Constitutional: no weight loss, weight gain, fever, fatigue  Eyes:  No vision changes, glasses/contacts  ENT/Mouth: No ulcers, sinus problems, ears ringing,  "headache  Cardiovascular: No inability to lie flat, chest pain, exercise intolerance, swelling, heart palpitations  Respiratory: No wheezing, coughing blood, shortness of breath, or cough  Gastrointestinal: No diarrhea, bloody stool, nausea/vomiting, constipation, gas, hemorrhoids  Genitourinary: No blood in urine, painful urination, urgency of urination, frequency of urination, incomplete emptying, incontinence, abnormal bleeding, painful periods, heavy periods, vaginal discharge, vaginal odor, painful intercourse, sexual problems, bleeding after intercourse.  Musculoskeletal: No muscle weakness  Skin/Breast: No painful breasts, nipple discharge, masses, rash, ulcers  Neurological: No passing out, seizures, numbness, headache  Endocrine: No diabetes, hypothyroid, hyperthyroid, +hot flashes, hair loss, abnormal hair growth, ance  Psychiatric: No depression, crying  Hematologic: No bruises, bleeding, swollen lymph nodes, anemia.      OBJECTIVE:   The patient appears well, alert, oriented x 3, in no distress.  /80   Ht 5' 2" (1.575 m)   Wt 51 kg (112 lb 7 oz)   BMI 20.56 kg/m²   NECK: no thyromegaly, trachea midline  SKIN: no acne, striae, hirsutism  CHEST: CTAB  CV: RRR  BREAST EXAM: breasts appear normal, no suspicious masses, no skin or nipple changes or axillary nodes  ABDOMEN: no hernias, masses, or hepatosplenomegaly  GENITALIA: normal external genitalia, no erythema, no discharge  URETHRA: normal urethra, normal urethral meatus  VAGINA: Normal  CERVIX: no lesions or cervical motion tenderness  UTERUS: removed  ADNEXA: no mass, fullness, tenderness      ASSESSMENT:   1. Postop check         PLAN:   Orders Placed This Encounter    ibuprofen (ADVIL,MOTRIN) 600 MG tablet    estradiol-norethindrone (ACTIVELLA) 1-0.5 mg per tablet     Discussed surgery, path, endo, menopause sx, HRT.  Desires refill of motrin 600 mg  Return to clinic 3 months  "

## 2019-05-20 ENCOUNTER — PATIENT MESSAGE (OUTPATIENT)
Dept: OBSTETRICS AND GYNECOLOGY | Facility: CLINIC | Age: 49
End: 2019-05-20

## 2019-05-29 ENCOUNTER — TELEPHONE (OUTPATIENT)
Dept: OPTOMETRY | Facility: CLINIC | Age: 49
End: 2019-05-29

## 2019-06-06 RX ORDER — IBUPROFEN 600 MG/1
TABLET ORAL
Qty: 30 TABLET | Refills: 0 | Status: SHIPPED | OUTPATIENT
Start: 2019-06-06 | End: 2021-03-05

## 2019-06-18 ENCOUNTER — OFFICE VISIT (OUTPATIENT)
Dept: INTERNAL MEDICINE | Facility: CLINIC | Age: 49
End: 2019-06-18
Payer: COMMERCIAL

## 2019-06-18 ENCOUNTER — PATIENT MESSAGE (OUTPATIENT)
Dept: INTERNAL MEDICINE | Facility: CLINIC | Age: 49
End: 2019-06-18

## 2019-06-18 VITALS
TEMPERATURE: 99 F | BODY MASS INDEX: 21.38 KG/M2 | OXYGEN SATURATION: 99 % | WEIGHT: 116.19 LBS | DIASTOLIC BLOOD PRESSURE: 70 MMHG | HEIGHT: 62 IN | SYSTOLIC BLOOD PRESSURE: 110 MMHG | HEART RATE: 80 BPM

## 2019-06-18 DIAGNOSIS — H60.92 OTITIS EXTERNA OF LEFT EAR, UNSPECIFIED CHRONICITY, UNSPECIFIED TYPE: Primary | ICD-10-CM

## 2019-06-18 DIAGNOSIS — H66.92 LEFT OTITIS MEDIA, UNSPECIFIED OTITIS MEDIA TYPE: Primary | ICD-10-CM

## 2019-06-18 DIAGNOSIS — H60.92 OTITIS EXTERNA OF LEFT EAR, UNSPECIFIED CHRONICITY, UNSPECIFIED TYPE: ICD-10-CM

## 2019-06-18 PROCEDURE — 3008F PR BODY MASS INDEX (BMI) DOCUMENTED: ICD-10-PCS | Mod: CPTII,S$GLB,, | Performed by: PHYSICIAN ASSISTANT

## 2019-06-18 PROCEDURE — 99213 PR OFFICE/OUTPT VISIT, EST, LEVL III, 20-29 MIN: ICD-10-PCS | Mod: S$GLB,,, | Performed by: PHYSICIAN ASSISTANT

## 2019-06-18 PROCEDURE — 99999 PR PBB SHADOW E&M-EST. PATIENT-LVL IV: CPT | Mod: PBBFAC,,, | Performed by: PHYSICIAN ASSISTANT

## 2019-06-18 PROCEDURE — 99999 PR PBB SHADOW E&M-EST. PATIENT-LVL IV: ICD-10-PCS | Mod: PBBFAC,,, | Performed by: PHYSICIAN ASSISTANT

## 2019-06-18 PROCEDURE — 3008F BODY MASS INDEX DOCD: CPT | Mod: CPTII,S$GLB,, | Performed by: PHYSICIAN ASSISTANT

## 2019-06-18 PROCEDURE — 99213 OFFICE O/P EST LOW 20 MIN: CPT | Mod: S$GLB,,, | Performed by: PHYSICIAN ASSISTANT

## 2019-06-18 RX ORDER — AMOXICILLIN 875 MG/1
875 TABLET, FILM COATED ORAL 2 TIMES DAILY
Qty: 14 TABLET | Refills: 0 | Status: SHIPPED | OUTPATIENT
Start: 2019-06-18 | End: 2019-06-25

## 2019-06-18 RX ORDER — HYDROXYZINE HYDROCHLORIDE 50 MG/1
TABLET, FILM COATED ORAL
Refills: 0 | COMMUNITY
Start: 2019-06-03 | End: 2021-08-16

## 2019-06-18 RX ORDER — NEOMYCIN SULFATE, POLYMYXIN B SULFATE AND HYDROCORTISONE 10; 3.5; 1 MG/ML; MG/ML; [USP'U]/ML
4 SUSPENSION/ DROPS AURICULAR (OTIC) 3 TIMES DAILY
Qty: 10 ML | Refills: 0 | Status: SHIPPED | OUTPATIENT
Start: 2019-06-18 | End: 2019-06-18

## 2019-06-18 RX ORDER — SERTRALINE HYDROCHLORIDE 100 MG/1
200 TABLET, FILM COATED ORAL DAILY
COMMUNITY
End: 2021-08-16

## 2019-06-18 RX ORDER — OFLOXACIN 3 MG/ML
10 SOLUTION AURICULAR (OTIC) DAILY
Qty: 10 ML | Refills: 0 | Status: SHIPPED | OUTPATIENT
Start: 2019-06-18 | End: 2019-10-14

## 2019-06-18 NOTE — PATIENT INSTRUCTIONS
External Ear Infection (Adult)    External otitis (also called swimmers ear) is an infection in the ear canal. It is often caused by bacteria or fungus. It can occur a few days after water gets trapped in the ear canal (from swimming or bathing). It can also occur after cleaning too deeply in the ear canal with a cotton swab or other object. Sometimes, hair care products get into the ear canal and cause this problem.  Symptoms can include pain, fever, itching, redness, drainage, or swelling of the ear canal. Temporary hearing loss may also occur.  Home care  · Do not try to clean the ear canal. This can push pus and bacteria deeper into the canal.  · Use prescribed ear drops as directed. These help reduce swelling and fight the infection. If an ear wick was placed in the ear canal, apply drops right onto the end of the wick. The wick will draw the medication into the ear canal even if it is swollen closed.  · A cotton ball may be loosely placed in the outer ear to absorb any drainage.  · You may use acetaminophen or ibuprofen to control pain, unless another medication was prescribed. Note: If you have chronic liver or kidney disease or ever had a stomach ulcer or GI bleeding, talk to your health care provider before taking any of these medications.  · Do not allow water to get into your ear when bathing. Also, avoid swimming until the infection has cleared.  Prevention  · Keep your ears dry. This helps lower the risk of infection. Dry your ears with a towel or hair dryer after getting wet. Also, use ear plugs when swimming.  · Do not stick any objects in the ear to remove wax.  · If you feel water trapped in your ear, use ear drops right away. You can get these drops over the counter at most drugstores. They work by removing water from the ear canal.  Follow-up care  Follow up with your health care provider in one week, or as advised.  When to seek medical advice  Call your health care provider right away if  any of these occur:  · Ear pain becomes worse or doesnt improve after 3 days of treatment  · Redness or swelling of the outer ear occurs or gets worse  · Headache  · Painful or stiff neck  · Drowsiness or confusion  · Fever of 100.4ºF (38ºC) or higher, or as directed by your health care provider  · Seizure  Date Last Reviewed: 3/22/2015  © 9195-9950 [employer]. [address] [place] [address], [place] [address]. All rights reserved. This information is not intended as a substitute for professional medical care. Always follow your healthcare professional's instructions.        Middle Ear Infection (Adult)  You have an infection of the middle ear, the space behind the eardrum. This is also called acute otitis media (AOM). Sometimes it is caused by the common cold. This is because congestion can block the internal passage (eustachian tube) that drains fluid from the middle ear. When the middle ear fills with fluid, bacteria can grow there and cause an infection. Oral antibiotics are used to treat this illness, not ear drops. Symptoms usually start to improve within 1 to 2 days of treatment.    Home care  The following are general care guidelines:  · Finish all of the antibiotic medicine given, even though you may feel better after the first few days.  · You may use over-the-counter medicine, such as acetaminophen or ibuprofen, to control pain and fever, unless something else was prescribed. If you have chronic liver or kidney disease or have ever had a stomach ulcer or gastrointestinal bleeding, talk with your healthcare provider before using these medicines. Do not give aspirin to anyone under 18 years of age who has a fever. It may cause severe illness or death.  Follow-up care  Follow up with your healthcare provider, or as advised, in 2 weeks if all symptoms have not gotten better, or if hearing doesn't go back to normal within 1 month.  When to seek medical advice  Call your healthcare provider right away  if any of these occur:  · Ear pain gets worse or does not improve after 3 days of treatment  · Unusual drowsiness or confusion  · Neck pain, stiff neck, or headache  · Fluid or blood draining from the ear canal  · Fever of 100.4°F (38°C) or as advised   · Seizure  Date Last Reviewed: 6/1/2016  © 0907-8076 [employer]. [address] [place] [address]. All rights reserved. This information is not intended as a substitute for professional medical care. Always follow your healthcare professional's instructions.

## 2019-06-18 NOTE — PROGRESS NOTES
Subjective:       Patient ID: Deja Theodore is a 49 y.o. female.        Chief Complaint: Otalgia (left ear)    Deja Theodore is an established patient of Fransisco Sepulveda MD here today for urgent care visit.    Left ear pain:  Started about 5 days ago.  Went on vacation and swimming the whole time.  Also flew.  Pain is gradually worsening.  Feels clogged and painful to move the ear.  No discharge.  No change in hearing.  She applied peroxide and alcohol to the ear without relief.  She also tried taking sudafed without relief.  No fever.  No other URI sx.        Otalgia    There is pain in the left ear. This is a new problem. The current episode started in the past 7 days. The problem occurs constantly. The problem has been waxing and waning. There has been no fever. The pain is at a severity of 8/10. The pain is moderate. Pertinent negatives include no abdominal pain, coughing, diarrhea, drainage, ear discharge, headaches, hearing loss, neck pain, rash, rhinorrhea, sore throat or vomiting. She has tried acetaminophen and ear drops for the symptoms. The treatment provided mild relief. There is no history of a chronic ear infection, hearing loss or a tympanostomy tube.      Review of Systems   Constitutional: Negative for chills, diaphoresis, fatigue and fever.   HENT: Positive for ear pain. Negative for congestion, ear discharge, hearing loss, rhinorrhea and sore throat.    Eyes: Negative for visual disturbance.   Respiratory: Negative for cough, chest tightness and shortness of breath.    Cardiovascular: Negative for chest pain, palpitations and leg swelling.   Gastrointestinal: Negative for abdominal pain, blood in stool, constipation, diarrhea, nausea and vomiting.   Genitourinary: Negative for dysuria, frequency, hematuria and urgency.   Musculoskeletal: Negative for arthralgias, back pain and neck pain.   Skin: Negative for rash.   Neurological: Negative for dizziness, syncope, weakness and headaches.    Psychiatric/Behavioral: Negative for dysphoric mood and sleep disturbance. The patient is not nervous/anxious.        Objective:      Physical Exam   Constitutional: She appears well-developed and well-nourished. No distress.   HENT:   Head: Normocephalic and atraumatic.   Right Ear: Tympanic membrane and external ear normal.   Left Ear: External ear normal. There is swelling and tenderness. Tympanic membrane is erythematous. A middle ear effusion is present.   Nose: Nose normal.   Mouth/Throat: Oropharynx is clear and moist.   Eyes: Pupils are equal, round, and reactive to light. Conjunctivae are normal.   Cardiovascular: Normal rate, regular rhythm and normal heart sounds. Exam reveals no gallop.   No murmur heard.  Pulmonary/Chest: Effort normal and breath sounds normal. No respiratory distress.   Abdominal: Soft. Normal appearance. There is no tenderness. There is no rebound, no guarding and no CVA tenderness.   Musculoskeletal: She exhibits no edema.   Neurological: She is alert.   Skin: Skin is warm and dry. She is not diaphoretic.   Psychiatric: She has a normal mood and affect.   Nursing note and vitals reviewed.      Assessment:       1. Left otitis media, unspecified otitis media type    2. Otitis externa of left ear, unspecified chronicity, unspecified type        Plan:       Deja was seen today for otalgia.    Diagnoses and all orders for this visit:    Left otitis media, unspecified otitis media type  -     amoxicillin (AMOXIL) 875 MG tablet; Take 1 tablet (875 mg total) by mouth 2 (two) times daily. for 7 days    Otitis externa of left ear, unspecified chronicity, unspecified type  -     Discontinue: neomycin-polymyxin-hydrocortisone (CORTISPORIN) 3.5-10,000-1 mg/mL-unit/mL-% otic suspension; Place 4 drops into the left ear 3 (three) times daily.    Patient sent portal message that cortisporin otic drops expensive so changed to floxin otic.    No water in the ear.    Pt has been given  "instructions populated from [employer] database and has verbalized understanding of the after visit summary and information contained wherein.    Follow up with a primary care provider. May go to ER for acute shortness of breath, lightheadedness, fever, or any other emergent complaints or changes in condition.    "This note will be shared with the patient"    No future appointments.            "

## 2019-07-01 ENCOUNTER — PATIENT MESSAGE (OUTPATIENT)
Dept: INTERNAL MEDICINE | Facility: CLINIC | Age: 49
End: 2019-07-01

## 2019-07-01 DIAGNOSIS — H92.09 OTALGIA, UNSPECIFIED LATERALITY: Primary | ICD-10-CM

## 2019-07-08 ENCOUNTER — PATIENT MESSAGE (OUTPATIENT)
Dept: INTERNAL MEDICINE | Facility: CLINIC | Age: 49
End: 2019-07-08

## 2019-07-09 ENCOUNTER — OFFICE VISIT (OUTPATIENT)
Dept: INTERNAL MEDICINE | Facility: CLINIC | Age: 49
End: 2019-07-09
Payer: COMMERCIAL

## 2019-07-09 VITALS
HEART RATE: 78 BPM | DIASTOLIC BLOOD PRESSURE: 62 MMHG | HEIGHT: 62 IN | WEIGHT: 117.31 LBS | BODY MASS INDEX: 21.59 KG/M2 | SYSTOLIC BLOOD PRESSURE: 102 MMHG

## 2019-07-09 DIAGNOSIS — H69.92 DYSFUNCTION OF LEFT EUSTACHIAN TUBE: Primary | ICD-10-CM

## 2019-07-09 PROCEDURE — 99213 PR OFFICE/OUTPT VISIT, EST, LEVL III, 20-29 MIN: ICD-10-PCS | Mod: S$GLB,,, | Performed by: PHYSICIAN ASSISTANT

## 2019-07-09 PROCEDURE — 99999 PR PBB SHADOW E&M-EST. PATIENT-LVL IV: ICD-10-PCS | Mod: PBBFAC,,, | Performed by: PHYSICIAN ASSISTANT

## 2019-07-09 PROCEDURE — 99999 PR PBB SHADOW E&M-EST. PATIENT-LVL IV: CPT | Mod: PBBFAC,,, | Performed by: PHYSICIAN ASSISTANT

## 2019-07-09 PROCEDURE — 3008F PR BODY MASS INDEX (BMI) DOCUMENTED: ICD-10-PCS | Mod: CPTII,S$GLB,, | Performed by: PHYSICIAN ASSISTANT

## 2019-07-09 PROCEDURE — 99213 OFFICE O/P EST LOW 20 MIN: CPT | Mod: S$GLB,,, | Performed by: PHYSICIAN ASSISTANT

## 2019-07-09 PROCEDURE — 3008F BODY MASS INDEX DOCD: CPT | Mod: CPTII,S$GLB,, | Performed by: PHYSICIAN ASSISTANT

## 2019-07-09 RX ORDER — METHYLPREDNISOLONE 4 MG/1
TABLET ORAL
Qty: 1 PACKAGE | Refills: 0 | Status: SHIPPED | OUTPATIENT
Start: 2019-07-09 | End: 2019-10-14

## 2019-07-09 RX ORDER — FLUTICASONE PROPIONATE 50 MCG
1 SPRAY, SUSPENSION (ML) NASAL DAILY
Qty: 16 G | Refills: 0 | Status: SHIPPED | OUTPATIENT
Start: 2019-07-09 | End: 2019-10-14

## 2019-07-09 NOTE — PATIENT INSTRUCTIONS

## 2019-07-09 NOTE — PROGRESS NOTES
Subjective:       Patient ID: Deja Theodore is a 49 y.o. female.        Chief Complaint: Otalgia (L pain=9)    Deja Theodore is an established patient of Fransisco Sepulveda MD here today for urgent care visit.    Last seen by me 6/18/19 for left ear pain.  Dx with left otitis media and otitis externa.  Tx with amoxil and floxin otic with resolution of pain x 1 week.  Then pain gradually recurred and acutely worsened after flying again.  She began taking sudafed and afrin for a few days with transient improvement.  She continues to have left ear pain and also has sore throat on the left side.  She has a little bit of runny nose that just started in the past few days.      Otalgia    There is pain in the left ear. This is a recurrent problem. The current episode started in the past 7 days. The problem occurs constantly. The problem has been waxing and waning. There has been no fever. The pain is at a severity of 8/10. The pain is moderate. Associated symptoms include neck pain, rhinorrhea and a sore throat. Pertinent negatives include no abdominal pain, coughing, diarrhea, drainage, ear discharge, headaches, hearing loss, rash or vomiting. She has tried NSAIDs, antibiotics and ear drops for the symptoms. The treatment provided moderate relief. There is no history of a chronic ear infection, hearing loss or a tympanostomy tube.      Review of Systems   Constitutional: Negative for chills, diaphoresis, fatigue and fever.   HENT: Positive for ear pain, rhinorrhea and sore throat. Negative for congestion, ear discharge and hearing loss.    Eyes: Negative for visual disturbance.   Respiratory: Negative for cough, chest tightness and shortness of breath.    Cardiovascular: Negative for chest pain, palpitations and leg swelling.   Gastrointestinal: Negative for abdominal pain, blood in stool, constipation, diarrhea, nausea and vomiting.   Genitourinary: Negative for dysuria, frequency, hematuria and urgency.    Musculoskeletal: Positive for neck pain. Negative for arthralgias and back pain.   Skin: Negative for rash.   Neurological: Negative for dizziness, syncope, weakness and headaches.   Psychiatric/Behavioral: Negative for dysphoric mood and sleep disturbance. The patient is not nervous/anxious.        Objective:      Physical Exam   Constitutional: She appears well-developed and well-nourished.   HENT:   Head: Normocephalic.   Right Ear: External ear normal. Tympanic membrane is not erythematous and not retracted. No middle ear effusion.   Left Ear: External ear normal. Tympanic membrane is not erythematous and not retracted. A middle ear effusion (slight pale yellow fluid) is present.   Mouth/Throat: Oropharynx is clear and moist. No oropharyngeal exudate, posterior oropharyngeal edema or posterior oropharyngeal erythema.   Eyes: Pupils are equal, round, and reactive to light.   Cardiovascular: Normal rate, regular rhythm and normal heart sounds. Exam reveals no gallop and no friction rub.   No murmur heard.  Pulmonary/Chest: Effort normal and breath sounds normal. No respiratory distress.   Abdominal: Soft. Normal appearance. There is no tenderness.   Musculoskeletal: She exhibits no edema.   Neurological: She is alert.   Skin: Skin is warm and dry.   Psychiatric: She has a normal mood and affect.   Nursing note and vitals reviewed.      Assessment:       1. Dysfunction of left eustachian tube        Plan:       Deja was seen today for otalgia.    Diagnoses and all orders for this visit:    Dysfunction of left eustachian tube  -     methylPREDNISolone (MEDROL DOSEPACK) 4 mg tablet; use as directed  -     fluticasone propionate (FLONASE) 50 mcg/actuation nasal spray; 1 spray (50 mcg total) by Each Nare route once daily.    Given persistence of pain, will try steroid taper and add flonase.  ENT referral placed yesterday and she will call to schedule.  She is travelling to [place] and will also see ENT in [place]  "upon arrival.    Pt has been given instructions populated from [employer] database and has verbalized understanding of the after visit summary and information contained wherein.    Follow up with a primary care provider. May go to ER for acute shortness of breath, lightheadedness, fever, or any other emergent complaints or changes in condition.    "This note will be shared with the patient"    Future Appointments   Date Time Provider Department Center   8/15/2019  8:30 AM DUGLAS Gale MD [facility]               "

## 2019-07-25 ENCOUNTER — PATIENT MESSAGE (OUTPATIENT)
Dept: INTERNAL MEDICINE | Facility: CLINIC | Age: 49
End: 2019-07-25

## 2019-07-29 ENCOUNTER — PATIENT MESSAGE (OUTPATIENT)
Dept: OBSTETRICS AND GYNECOLOGY | Facility: CLINIC | Age: 49
End: 2019-07-29

## 2019-07-29 ENCOUNTER — TELEPHONE (OUTPATIENT)
Dept: OBSTETRICS AND GYNECOLOGY | Facility: CLINIC | Age: 49
End: 2019-07-29

## 2019-07-29 NOTE — TELEPHONE ENCOUNTER
Provider advise patient to have Immunization form completed by primary Care provider. Patient is scheduled 07/31/2019.

## 2019-07-29 NOTE — TELEPHONE ENCOUNTER
Please call patient and schedule patient for an appointment with me - use same day change or UC. Thank you.

## 2019-07-29 NOTE — TELEPHONE ENCOUNTER
----- Message from Prudencio Alonso sent at 7/29/2019  1:05 PM CDT -----  Contact: RIRI BROWN   Name of Who is Calling: RIRI BROWN       What is the request in detail: Patient is requesting a call back in regards to immunizations. Patient is requesting a call after 3 pm.      Can the clinic reply by [facility]: no      What [facility]: [phone]

## 2019-07-31 ENCOUNTER — OFFICE VISIT (OUTPATIENT)
Dept: INTERNAL MEDICINE | Facility: CLINIC | Age: 49
End: 2019-07-31
Attending: FAMILY MEDICINE
Payer: COMMERCIAL

## 2019-07-31 ENCOUNTER — LAB VISIT (OUTPATIENT)
Dept: LAB | Facility: HOSPITAL | Age: 49
End: 2019-07-31
Attending: FAMILY MEDICINE
Payer: COMMERCIAL

## 2019-07-31 ENCOUNTER — IMMUNIZATION (OUTPATIENT)
Dept: PHARMACY | Facility: CLINIC | Age: 49
End: 2019-07-31
Payer: COMMERCIAL

## 2019-07-31 VITALS
OXYGEN SATURATION: 99 % | TEMPERATURE: 98 F | SYSTOLIC BLOOD PRESSURE: 102 MMHG | DIASTOLIC BLOOD PRESSURE: 72 MMHG | HEIGHT: 62 IN | WEIGHT: 114.5 LBS | BODY MASS INDEX: 21.07 KG/M2 | HEART RATE: 94 BPM

## 2019-07-31 DIAGNOSIS — Z00.00 ANNUAL PHYSICAL EXAM: ICD-10-CM

## 2019-07-31 DIAGNOSIS — Z00.00 ANNUAL PHYSICAL EXAM: Primary | ICD-10-CM

## 2019-07-31 DIAGNOSIS — M54.2 CERVICALGIA: ICD-10-CM

## 2019-07-31 DIAGNOSIS — H69.90 DYSFUNCTION OF EUSTACHIAN TUBE, UNSPECIFIED LATERALITY: ICD-10-CM

## 2019-07-31 PROCEDURE — 99396 PREV VISIT EST AGE 40-64: CPT | Mod: S$GLB,,, | Performed by: FAMILY MEDICINE

## 2019-07-31 PROCEDURE — 99396 PR PREVENTIVE VISIT,EST,40-64: ICD-10-PCS | Mod: S$GLB,,, | Performed by: FAMILY MEDICINE

## 2019-07-31 PROCEDURE — 86480 TB TEST CELL IMMUN MEASURE: CPT

## 2019-07-31 PROCEDURE — 99999 PR PBB SHADOW E&M-EST. PATIENT-LVL III: CPT | Mod: PBBFAC,,, | Performed by: FAMILY MEDICINE

## 2019-07-31 PROCEDURE — 99999 PR PBB SHADOW E&M-EST. PATIENT-LVL III: ICD-10-PCS | Mod: PBBFAC,,, | Performed by: FAMILY MEDICINE

## 2019-07-31 NOTE — PROGRESS NOTES
Subjective:       Patient ID: Deja Theodore is a 49 y.o. female.    Chief Complaint: form filled out for school    Established patient for an annual wellness check/physical exam and also chronic disease management. Specific complaints - see dictation and please see ROS.  P, S, Fm, Soc Hx's; Meds, allergies reviewed and reconciled.  Health maintenance file reviewed and addressed items due.      Review of Systems   Constitutional: Negative for chills, fatigue, fever and unexpected weight change.   HENT: Positive for ear pain. Negative for congestion and trouble swallowing.    Eyes: Negative for redness and visual disturbance.   Respiratory: Negative for cough, chest tightness and shortness of breath.    Cardiovascular: Negative for chest pain, palpitations and leg swelling.   Gastrointestinal: Negative for abdominal pain and blood in stool.   Genitourinary: Negative for difficulty urinating, hematuria and menstrual problem.   Musculoskeletal: Positive for neck pain. Negative for arthralgias, back pain, gait problem, joint swelling and myalgias.   Skin: Negative for color change and rash.   Neurological: Negative for tremors, speech difficulty, weakness, numbness and headaches.   Hematological: Negative for adenopathy. Does not bruise/bleed easily.   Psychiatric/Behavioral: Negative for behavioral problems, confusion and sleep disturbance. The patient is not nervous/anxious.        Objective:      Physical Exam   Constitutional: She is oriented to person, place, and time. She appears well-developed and well-nourished. No distress.   HENT:   Head: Normocephalic.   Right Ear: Tympanic membrane, external ear and ear canal normal.   Left Ear: Tympanic membrane, external ear and ear canal normal.   Nose: Nose normal.   Mouth/Throat: Oropharynx is clear and moist. No oropharyngeal exudate.   Eyes: Conjunctivae are normal. Right eye exhibits no discharge. Left eye exhibits no discharge. No scleral icterus.   Neck: Trachea  normal, normal range of motion and full passive range of motion without pain. Neck supple. No thyroid mass and no thyromegaly present.   Cardiovascular: Normal rate, regular rhythm, normal heart sounds and intact distal pulses. Exam reveals no gallop and no friction rub.   No murmur heard.  Pulmonary/Chest: Effort normal and breath sounds normal. No respiratory distress. She has no wheezes. She has no rales. She exhibits no tenderness.   Abdominal: Soft. There is no tenderness.   Musculoskeletal: She exhibits no edema.        Right shoulder: She exhibits normal range of motion and no tenderness.        Left shoulder: She exhibits normal range of motion and no tenderness.        Cervical back: She exhibits normal range of motion and no tenderness.        Right hand: Normal sensation noted. Decreased sensation is not present in the ulnar distribution, is not present in the medial distribution and is not present in the radial distribution. Normal strength noted. She exhibits no finger abduction, no thumb/finger opposition and no wrist extension trouble.        Left hand: Normal sensation noted. Decreased sensation is not present in the ulnar distribution, is not present in the medial redistribution and is not present in the radial distribution. Normal strength noted. She exhibits no finger abduction, no thumb/finger opposition and no wrist extension trouble.        Right lower leg: She exhibits no edema.        Left lower leg: She exhibits no edema.   Lymphadenopathy:     She has no cervical adenopathy.   Neurological: She is alert and oriented to person, place, and time. She has normal strength. No cranial nerve deficit or sensory deficit. Coordination and gait normal.   Reflex Scores:       Tricep reflexes are 0 on the right side and 0 on the left side.       Bicep reflexes are 0 on the right side and 0 on the left side.       Brachioradialis reflexes are 0 on the right side and 0 on the left side.  Skin: Skin is  warm and dry. No rash noted. She is not diaphoretic.   Psychiatric: She has a normal mood and affect. Her behavior is normal. Judgment and thought content normal.   Nursing note and vitals reviewed.      Assessment:       1. Annual physical exam    2. Dysfunction of Eustachian tube, unspecified laterality    3. Cervicalgia        Plan:     Medication List with Changes/Refills   Current Medications    FLUTICASONE PROPIONATE (FLONASE) 50 MCG/ACTUATION NASAL SPRAY    1 spray (50 mcg total) by Each Nare route once daily.    HYDROXYZINE (ATARAX) 50 MG TABLET    TK 1 T PO  HS PRN FOR INSOMNIA    IBUPROFEN (ADVIL,MOTRIN) 600 MG TABLET    TAKE 1 TABLET(600 MG) BY MOUTH EVERY 6 HOURS AS NEEDED    METHYLPREDNISOLONE (MEDROL DOSEPACK) 4 MG TABLET    use as directed    OFLOXACIN (FLOXIN) 0.3 % OTIC SOLUTION    Place 10 drops into the left ear once daily.    SERTRALINE (ZOLOFT) 100 MG TABLET    Take 200 mg by mouth once daily.    UREA 10 % LOTN    Apply 1 application topically once daily. To dry skin on the feet.     Deja was seen today for form filled out for school.    Diagnoses and all orders for this visit:    Annual physical exam  -     Rubella antibody, IgG; Future  -     Rubeola antibody IgG; Future  -     Mumps Virus Antibodies, IgG and IgM; Future  -     Quantiferon Gold TB; Future    Dysfunction of Eustachian tube, unspecified laterality    Cervicalgia      See meds, orders, follow up, routing and instructions sections of encounter.  A 49-year-old established female patient.  She is here essentially for school   certification of immunizations.  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  She is complaining of neck pain.  She   thinks this is worse from travel recently and additionally some stuffiness   and/or pain in the left ear.  These are secondary complaints.  She is concerned   about getting her school immunizations certification filled out.  I offered her    physical therapy and eardrops.  Her ear examination was normal.  For the time   being, she wants to hold off.      RAHEEM/HN  dd: 07/31/2019 17:11:49 (CDT)  td: 08/01/2019 09:59:21 (CDT)  Doc ID   #[id number]  Job ID #[id number]    CC:

## 2019-08-02 LAB
M TB IFN-G CD4+ BCKGRND COR BLD-ACNC: -0.02 IU/ML
MITOGEN IGNF BCKGRD COR BLD-ACNC: >10 IU/ML
MITOGEN IGNF BCKGRD COR BLD-ACNC: NEGATIVE [IU]/ML
NIL: 0.11 IU/ML
TB2 - NIL: -0.02 IU/ML

## 2019-08-04 ENCOUNTER — PATIENT MESSAGE (OUTPATIENT)
Dept: INTERNAL MEDICINE | Facility: CLINIC | Age: 49
End: 2019-08-04

## 2019-08-29 ENCOUNTER — PATIENT MESSAGE (OUTPATIENT)
Dept: INTERNAL MEDICINE | Facility: CLINIC | Age: 49
End: 2019-08-29

## 2019-08-29 ENCOUNTER — DOCUMENTATION ONLY (OUTPATIENT)
Dept: INTERNAL MEDICINE | Facility: CLINIC | Age: 49
End: 2019-08-29

## 2019-08-29 DIAGNOSIS — M54.2 CERVICALGIA: Primary | ICD-10-CM

## 2019-08-29 NOTE — TELEPHONE ENCOUNTER
Please forward external referral/lab orders per patient request (fax or provide to patient). Please call and verify that any faxes sent were received. Thank you.    If patient desires, [facility].

## 2019-08-31 ENCOUNTER — PATIENT MESSAGE (OUTPATIENT)
Dept: OBSTETRICS AND GYNECOLOGY | Facility: CLINIC | Age: 49
End: 2019-08-31

## 2019-09-02 ENCOUNTER — PATIENT MESSAGE (OUTPATIENT)
Dept: OBSTETRICS AND GYNECOLOGY | Facility: CLINIC | Age: 49
End: 2019-09-02

## 2019-09-20 ENCOUNTER — TELEPHONE (OUTPATIENT)
Dept: ORTHOPEDICS | Facility: CLINIC | Age: 49
End: 2019-09-20

## 2019-09-20 DIAGNOSIS — M50.30 DDD (DEGENERATIVE DISC DISEASE), CERVICAL: Primary | ICD-10-CM

## 2019-09-25 ENCOUNTER — PATIENT OUTREACH (OUTPATIENT)
Dept: ADMINISTRATIVE | Facility: OTHER | Age: 49
End: 2019-09-25

## 2019-09-27 ENCOUNTER — OFFICE VISIT (OUTPATIENT)
Dept: OPTOMETRY | Facility: CLINIC | Age: 49
End: 2019-09-27
Payer: COMMERCIAL

## 2019-09-27 DIAGNOSIS — H52.4 MYOPIA WITH ASTIGMATISM AND PRESBYOPIA, BILATERAL: ICD-10-CM

## 2019-09-27 DIAGNOSIS — H52.203 MYOPIA WITH ASTIGMATISM AND PRESBYOPIA, BILATERAL: ICD-10-CM

## 2019-09-27 DIAGNOSIS — H04.123 BILATERAL DRY EYES: Primary | ICD-10-CM

## 2019-09-27 DIAGNOSIS — H52.13 MYOPIA WITH ASTIGMATISM AND PRESBYOPIA, BILATERAL: ICD-10-CM

## 2019-09-27 DIAGNOSIS — Z46.0 FITTING AND ADJUSTMENT OF SPECTACLES AND CONTACT LENSES: Primary | ICD-10-CM

## 2019-09-27 PROCEDURE — 99999 PR PBB SHADOW E&M-EST. PATIENT-LVL II: CPT | Mod: PBBFAC,,, | Performed by: OPTOMETRIST

## 2019-09-27 PROCEDURE — 99999 PR PBB SHADOW E&M-EST. PATIENT-LVL II: ICD-10-PCS | Mod: PBBFAC,,, | Performed by: OPTOMETRIST

## 2019-09-27 PROCEDURE — 92310 CONTACT LENS FITTING OU: CPT | Mod: CSM,,, | Performed by: OPTOMETRIST

## 2019-09-27 PROCEDURE — 92014 PR EYE EXAM, EST PATIENT,COMPREHESV: ICD-10-PCS | Mod: S$GLB,,, | Performed by: OPTOMETRIST

## 2019-09-27 PROCEDURE — 92015 PR REFRACTION: ICD-10-PCS | Mod: S$GLB,,, | Performed by: OPTOMETRIST

## 2019-09-27 PROCEDURE — 92310 PR CONTACT LENS FITTING (NO CHANGE): ICD-10-PCS | Mod: CSM,,, | Performed by: OPTOMETRIST

## 2019-09-27 PROCEDURE — 92014 COMPRE OPH EXAM EST PT 1/>: CPT | Mod: S$GLB,,, | Performed by: OPTOMETRIST

## 2019-09-27 PROCEDURE — 92015 DETERMINE REFRACTIVE STATE: CPT | Mod: S$GLB,,, | Performed by: OPTOMETRIST

## 2019-09-27 NOTE — PROGRESS NOTES
HPI     Wearing Acuvue Oasys. Doing monthly replacement. Using optifree. Recently   tried generic solution. Eyes have been feeling tired and burning since   going back to school.     Last edited by Hoang Palacio, OD on 9/27/2019 10:26 AM. (History)            Assessment /Plan     For exam results, see Encounter Report.    Bilateral dry eyes    Myopia with astigmatism and presbyopia, bilateral      1. Recommend artificial tears. 1 drop 2x per day. Chronicity of disease and treatment discussed.  2. New Spec Rx given and contact lens Rx released to pt. Daily wear only advised, with education to risks of extended wear.  Discussed lens care, compliance and solutions. RTC yearly contact lens follow up.   . Different lens options discussed with patient. RTC 1 year full exam. Gave sample kit of [employer] with instructions.

## 2019-10-01 ENCOUNTER — PATIENT OUTREACH (OUTPATIENT)
Dept: ADMINISTRATIVE | Facility: HOSPITAL | Age: 49
End: 2019-10-01

## 2019-10-02 ENCOUNTER — HOSPITAL ENCOUNTER (OUTPATIENT)
Dept: RADIOLOGY | Facility: HOSPITAL | Age: 49
Discharge: HOME | End: 2019-10-02
Attending: PHYSICIAN ASSISTANT
Payer: COMMERCIAL

## 2019-10-02 ENCOUNTER — OFFICE VISIT (OUTPATIENT)
Dept: ORTHOPEDICS | Facility: CLINIC | Age: 49
End: 2019-10-02
Payer: COMMERCIAL

## 2019-10-02 VITALS — HEIGHT: 62 IN | WEIGHT: 114.63 LBS | BODY MASS INDEX: 21.1 KG/M2

## 2019-10-02 DIAGNOSIS — M54.16 LUMBAR RADICULOPATHY: ICD-10-CM

## 2019-10-02 DIAGNOSIS — M50.30 DDD (DEGENERATIVE DISC DISEASE), CERVICAL: ICD-10-CM

## 2019-10-02 DIAGNOSIS — M54.2 NECK PAIN: ICD-10-CM

## 2019-10-02 DIAGNOSIS — M43.00 SPONDYLOLYSIS: ICD-10-CM

## 2019-10-02 DIAGNOSIS — M43.16 SPONDYLOLISTHESIS OF LUMBAR REGION: Primary | ICD-10-CM

## 2019-10-02 PROCEDURE — 3008F PR BODY MASS INDEX (BMI) DOCUMENTED: ICD-10-PCS | Mod: CPTII,S$GLB,, | Performed by: PHYSICIAN ASSISTANT

## 2019-10-02 PROCEDURE — 72050 X-RAY EXAM NECK SPINE 4/5VWS: CPT | Mod: TC

## 2019-10-02 PROCEDURE — 99999 PR PBB SHADOW E&M-EST. PATIENT-LVL III: ICD-10-PCS | Mod: PBBFAC,,, | Performed by: PHYSICIAN ASSISTANT

## 2019-10-02 PROCEDURE — 72050 XR CERVICAL SPINE AP LAT WITH FLEX EXTEN: ICD-10-PCS | Mod: 26,,, | Performed by: RADIOLOGY

## 2019-10-02 PROCEDURE — 3008F BODY MASS INDEX DOCD: CPT | Mod: CPTII,S$GLB,, | Performed by: PHYSICIAN ASSISTANT

## 2019-10-02 PROCEDURE — 99214 PR OFFICE/OUTPT VISIT, EST, LEVL IV, 30-39 MIN: ICD-10-PCS | Mod: S$GLB,,, | Performed by: PHYSICIAN ASSISTANT

## 2019-10-02 PROCEDURE — 99214 OFFICE O/P EST MOD 30 MIN: CPT | Mod: S$GLB,,, | Performed by: PHYSICIAN ASSISTANT

## 2019-10-02 PROCEDURE — 99999 PR PBB SHADOW E&M-EST. PATIENT-LVL III: CPT | Mod: PBBFAC,,, | Performed by: PHYSICIAN ASSISTANT

## 2019-10-02 PROCEDURE — 72050 X-RAY EXAM NECK SPINE 4/5VWS: CPT | Mod: 26,,, | Performed by: RADIOLOGY

## 2019-10-02 RX ORDER — METHOCARBAMOL 750 MG/1
750 TABLET, FILM COATED ORAL 3 TIMES DAILY
Qty: 60 TABLET | Refills: 0 | Status: SHIPPED | OUTPATIENT
Start: 2019-10-02 | End: 2019-10-22

## 2019-10-02 NOTE — PROGRESS NOTES
DATE: 10/2/2019  PATIENT: Deja Theodore    Supervising Physician: Macho Menendez M.D.    CHIEF COMPLAINT: neck pain    HISTORY:  Deja Theodore is a 49 y.o. female [employer] [facility] student at [employer] here for initial evaluation of neck and back pain (Neck - 10, Back - 10).  The pain radiates down her left leg.  The pain has been present for many years in her back and since June in her neck.  She says she was in a horseback riding accident when she was younger and was told she had a fracture in her back.  The patient describes the pain as aching. The pain is worse with standing, walking and with stress and improved by sitting or lying down. There is associated numbness and tingling in the bilateral hands.  She reports a history of carpal tunnel that is managed with night braces.  There is no subjective weakness. Prior treatments have included ibuprofen and a medrol dose pack, physical therapy many years ago for her back, but no recent physical therapy, ESIs or surgery.     The patient denies myelopathic symptoms such as handwriting changes or difficulty with buttons/coins/keys. Denies perineal paresthesias, bowel/bladder dysfunction.    PAST MEDICAL/SURGICAL HISTORY:  Past Medical History:   Diagnosis Date    Anemia     Arthritis     Carpal tunnel syndrome     Depression     Keratitis secondary to contact lens     OD    Other disorders of eyelid(374.89)     MGD    Spondylolisthesis      Past Surgical History:   Procedure Laterality Date    ADENOIDECTOMY      DIAGNOSTIC LAPAROSCOPY N/A 3/18/2019    Procedure: LAPAROSCOPY, DIAGNOSTIC;  Surgeon: Peggy Carrero MD;  Location: [facility] OR;  Service: OB/GYN;  Laterality: N/A;    HYSTERECTOMY      NOSE SURGERY      Age 18-cosmetic    PARTIAL HYSTERECTOMY  4/15/2019    Procedure: HYSTERECTOMY, SUPRACERVICAL;  Surgeon: Peggy Carrero MD;  Location: [facility] OR;  Service: OB/GYN;;  with bilateral salpingo-oopherectomy    TONSILLECTOMY          Medications:  Current Outpatient Medications on File Prior to Visit   Medication Sig Dispense Refill    fluticasone propionate (FLONASE) 50 mcg/actuation nasal spray 1 spray (50 mcg total) by Each Nare route once daily. 16 g 0    hydrOXYzine (ATARAX) 50 MG tablet TK 1 T PO  HS PRN FOR INSOMNIA  0    ibuprofen (ADVIL,MOTRIN) 600 MG tablet TAKE 1 TABLET(600 MG) BY MOUTH EVERY 6 HOURS AS NEEDED 30 tablet 0    methylPREDNISolone (MEDROL DOSEPACK) 4 mg tablet use as directed 1 Package 0    ofloxacin (FLOXIN) 0.3 % otic solution Place 10 drops into the left ear once daily. 10 mL 0    sertraline (ZOLOFT) 100 MG tablet Take 200 mg by mouth once daily.      urea 10 % Lotn Apply 1 application topically once daily. To dry skin on the feet. 177 mL 10     No current facility-administered medications on file prior to visit.        Social History:   Social History     Socioeconomic History    Marital status: Legally      Spouse name: Not on file    Number of children: 2    Years of education: Not on file    Highest education level: Not on file   Occupational History     Employer: not employed   Social Needs    Financial resource strain: Not hard at all    Food insecurity:     Worry: Never true     Inability: Never true    Transportation needs:     Medical: No     Non-medical: No   Tobacco Use    Smoking status: Former Smoker     Packs/day: 0.30     Years: 10.00     Pack years: 3.00     Types: Cigarettes     Last attempt to quit: 2005     Years since quittin.0    Smokeless tobacco: Never Used   Substance and Sexual Activity    Alcohol use: Not Currently     Frequency: Never     Binge frequency: Never     Comment: Rare    Drug use: No    Sexual activity: Not Currently     Partners: Male     Birth control/protection: None   Lifestyle    Physical activity:     Days per week: 0 days     Minutes per session: 0 min    Stress: Rather much   Relationships    Social connections:     Talks on  "phone: More than three times a week     Gets together: Three times a week     Attends Nondenominational service: Not on file     Active member of club or organization: No     Attends meetings of clubs or organizations: Never     Relationship status:    Other Topics Concern    Not on file   Social History Narrative    Not on file       REVIEW OF SYSTEMS:  Constitution: Negative. Negative for chills, fever and night sweats.   Cardiovascular: Negative for chest pain and syncope.   Respiratory: Negative for cough and shortness of breath.   Gastrointestinal: See HPI. Negative for nausea/vomiting. Negative for abdominal pain.  Genitourinary: See HPI. Negative for discoloration or dysuria.  Skin: Negative for dry skin, itching and rash.   Hematologic/Lymphatic: Negative for bleeding problem. Does not bruise/bleed easily.   Musculoskeletal: Negative for falls and muscle weakness.   Neurological: See HPI. No seizures.   Endocrine: Negative for polydipsia, polyphagia and polyuria.   Allergic/Immunologic: Negative for hives and persistent infections.  Psychiatric/Behavioral: Negative for depression and insomnia.         EXAM:  Ht 5' 2" (1.575 m)   Wt 52 kg (114 lb 10.2 oz)   LMP 03/18/2019 (Approximate)   BMI 20.97 kg/m²     General: The patient is a very pleasant 49 y.o. female in no apparent distress, the patient is oriented to person, place and time.  Psych: Normal mood and affect  HEENT: Vision grossly intact, hearing intact to the spoken word.  Lungs: Respirations unlabored.  Gait: Normal station and gait, no difficulty with toe or heel walk.   Skin: Cervical and lumbar skin negative for rashes, lesions, hairy patches and surgical scars.  Range of motion: Cervical and lumbar range of motion is acceptable. There is mild tenderness to palpation.  Spinal Balance: Global saggital and coronal spinal balance acceptable, no significant for scoliosis and kyphosis.  Musculoskeletal: No pain with the range of motion of the " bilateral shoulders and elbows. Normal bulk and contour of the bilateral hands.  Vascular: Bilateral hands warm and well perfused, radial pulses 2+ bilaterally.  Neurological: Normal strength and tone in all major motor groups in the bilateral upper and lower extremities. Normal sensation to light touch in the C5-T1 and L2-S1 dermatomes bilaterally.  Deep tendon reflexes symmetric 2+ in the bilateral upper and lower extremities.  Negative Inverted Radial Reflex and Glynn's bilaterally. Negative Babinski bilaterally.     IMAGING:   Today I personally reviewed AP, Lat and Flex/Ex  upright C-spine films that demonstrate mild straightening of the normal cervical lordosis.    CT abdomen and pelvis shows grade I isthmic spondylolisthesis at L5/S1.        Body mass index is 20.97 kg/m².    Hemoglobin A1C   Date Value Ref Range Status   06/09/2017 5.1 4.5 - 6.2 % Final     Comment:     According to ADA guidelines, hemoglobin A1C <7.0% represents  optimal control in non-pregnant diabetic patients.  Different  metrics may apply to specific populations.   Standards of Medical Care in Diabetes - 2016.  For the purpose of screening for the presence of diabetes:  <5.7%     Consistent with the absence of diabetes  5.7-6.4%  Consistent with increasing risk for diabetes   (prediabetes)  >or=6.5%  Consistent with diabetes  Currently no consensus exists for use of hemoglobin A1C  for diagnosis of diabetes for children.     11/20/2015 4.9 4.5 - 6.2 % Final       ASSESSMENT/PLAN:    Deja was seen today for neck pain and low-back pain.    Diagnoses and all orders for this visit:    Spondylolisthesis of lumbar region  -     Ambulatory Referral to Physical/Occupational Therapy  -     methocarbamol (ROBAXIN) 750 MG Tab; Take 1 tablet (750 mg total) by mouth 3 (three) times daily. As needed for muscle spasms for 60 doses    Spondylolysis  -     Ambulatory Referral to Physical/Occupational Therapy  -     methocarbamol (ROBAXIN) 750 MG  Tab; Take 1 tablet (750 mg total) by mouth 3 (three) times daily. As needed for muscle spasms for 60 doses    Lumbar radiculopathy  -     Ambulatory Referral to Physical/Occupational Therapy  -     methocarbamol (ROBAXIN) 750 MG Tab; Take 1 tablet (750 mg total) by mouth 3 (three) times daily. As needed for muscle spasms for 60 doses    Neck pain  -     Ambulatory Referral to Physical/Occupational Therapy  -     methocarbamol (ROBAXIN) 750 MG Tab; Take 1 tablet (750 mg total) by mouth 3 (three) times daily. As needed for muscle spasms for 60 doses    DDD (degenerative disc disease), cervical  -     Ambulatory Referral to Physical/Occupational Therapy  -     methocarbamol (ROBAXIN) 750 MG Tab; Take 1 tablet (750 mg total) by mouth 3 (three) times daily. As needed for muscle spasms for 60 doses    The patient is having neck pain as well as low back and left leg pain.  She has grade I isthmic spondylolisthesis at L5/S1.    Today we discussed at length all of the different treatment options including anti-inflammatories, acetaminophen, rest, ice, heat, physical therapy including strengthening and stretching exercises, home exercises, ROM, aerobic conditioning, aqua therapy, other modalities including ultrasound, massage, and dry needling, epidural steroid injections and finally surgical intervention.      The patient would like to try some physical therapy first.  She cannot take NSAIDs due to GI upset.  Prescription for robaxin sent to the pharmacy.  She will follow up with me after therapy in about 6 weeks if symptoms persist.       Follow up if symptoms worsen or fail to improve.

## 2019-10-07 ENCOUNTER — DOCUMENTATION ONLY (OUTPATIENT)
Dept: OPTOMETRY | Facility: CLINIC | Age: 49
End: 2019-10-07

## 2019-10-07 NOTE — PROGRESS NOTES
Glasses Prescription (9/27/2019)        Sphere Cylinder Axis Dist [facility] Add    Right -5.75 +1.00 177 20/20 +1.75    Left -6.50 +1.00 020 20/20 +1.75    Type:  PAL    Expiration Date:  9/27/2020            CL trials dispensed:    #1:  Air Optix Multifocal 8.6/14.2  OD -5.25/Med  OS -5.50/Med    #2  Dailies Total Multifocal  8.5/14.1  Right:  -4.75/Med  -5.00/Med  -5.25/Med    Left:  -5.50/Med  -5.75/Med      Order trials:  Proclear Multifocal Toric 8.4/14.4  Pair #1  OD -4.75 - 0.75 x 090 / D +2.00  OS -5.25 -0.75 x 110 /D +2.00    Pair #2  OD -4.75 - 0.75 x 090 / D +2.50   OS -5.25 -0.75 x 110 /D +2.50

## 2019-10-14 ENCOUNTER — OFFICE VISIT (OUTPATIENT)
Dept: PODIATRY | Facility: CLINIC | Age: 49
End: 2019-10-14
Payer: COMMERCIAL

## 2019-10-14 VITALS — HEART RATE: 67 BPM | SYSTOLIC BLOOD PRESSURE: 121 MMHG | DIASTOLIC BLOOD PRESSURE: 67 MMHG

## 2019-10-14 DIAGNOSIS — L85.1 PLANTAR POROKERATOSIS, ACQUIRED: Primary | ICD-10-CM

## 2019-10-14 PROCEDURE — 99999 PR PBB SHADOW E&M-EST. PATIENT-LVL II: CPT | Mod: PBBFAC,,, | Performed by: PODIATRIST

## 2019-10-14 PROCEDURE — 99213 PR OFFICE/OUTPT VISIT, EST, LEVL III, 20-29 MIN: ICD-10-PCS | Mod: S$GLB,,, | Performed by: PODIATRIST

## 2019-10-14 PROCEDURE — 99999 PR PBB SHADOW E&M-EST. PATIENT-LVL II: ICD-10-PCS | Mod: PBBFAC,,, | Performed by: PODIATRIST

## 2019-10-14 PROCEDURE — 99213 OFFICE O/P EST LOW 20 MIN: CPT | Mod: S$GLB,,, | Performed by: PODIATRIST

## 2019-10-14 RX ORDER — PROPRANOLOL HYDROCHLORIDE 10 MG/1
TABLET ORAL
Refills: 0 | COMMUNITY
Start: 2019-09-11 | End: 2020-01-20

## 2019-10-14 NOTE — PROGRESS NOTES
Chief Complaint   Patient presents with    Callouses          HPI:   Deja Theodore is a 49 y.o. female who presents to clinic with complaints of bilateral foot pain, due to painful calluses sub metatarsal heads bilateral  She is usually in athletic shoes with arch supports.  Today in flat sandals.    She has used urea lotion but lately not consistently.         PMH:  Patient Active Problem List   Diagnosis    Sciatica    Spondylosis with myelopathy, lumbar region    Degeneration of lumbar or lumbosacral intervertebral disc    Spondylolysis of lumbar region    Spondylolisthesis    Myopia of both eyes - Both Eyes    Gall bladder polyp    Headache, menstrual migraine, intractable    Bilateral carpal tunnel syndrome    Calculus of gallbladder without cholecystitis without obstruction    Paronychia of finger, right    Endometriosis         Current Outpatient Medications on File Prior to Visit   Medication Sig Dispense Refill    hydrOXYzine (ATARAX) 50 MG tablet TK 1 T PO  HS PRN FOR INSOMNIA  0    ibuprofen (ADVIL,MOTRIN) 600 MG tablet TAKE 1 TABLET(600 MG) BY MOUTH EVERY 6 HOURS AS NEEDED 30 tablet 0    methocarbamol (ROBAXIN) 750 MG Tab Take 1 tablet (750 mg total) by mouth 3 (three) times daily. As needed for muscle spasms for 60 doses 60 tablet 0    propranolol (INDERAL) 10 MG tablet TK 1/2 TO 1 T PO QAM  0    sertraline (ZOLOFT) 100 MG tablet Take 200 mg by mouth once daily.      urea 10 % Lotn Apply 1 application topically once daily. To dry skin on the feet. 177 mL 10    [DISCONTINUED] fluticasone propionate (FLONASE) 50 mcg/actuation nasal spray 1 spray (50 mcg total) by Each Nare route once daily. 16 g 0    [DISCONTINUED] methylPREDNISolone (MEDROL DOSEPACK) 4 mg tablet use as directed 1 Package 0    [DISCONTINUED] ofloxacin (FLOXIN) 0.3 % otic solution Place 10 drops into the left ear once daily. 10 mL 0     No current facility-administered medications on file prior to visit.             ALLG:  Review of patient's allergies indicates:   Allergen Reactions    No known allergies            Social History     Socioeconomic History    Marital status: Legally      Spouse name: Not on file    Number of children: 2    Years of education: Not on file    Highest education level: Not on file   Occupational History     Employer: not employed   Social Needs    Financial resource strain: Not hard at all    Food insecurity:     Worry: Never true     Inability: Never true    Transportation needs:     Medical: No     Non-medical: No   Tobacco Use    Smoking status: Former Smoker     Packs/day: 0.30     Years: 10.00     Pack years: 3.00     Types: Cigarettes     Last attempt to quit: 2005     Years since quittin.0    Smokeless tobacco: Never Used   Substance and Sexual Activity    Alcohol use: Not Currently     Frequency: Never     Binge frequency: Never     Comment: Rare    Drug use: No    Sexual activity: Not Currently     Partners: Male     Birth control/protection: None   Lifestyle    Physical activity:     Days per week: 0 days     Minutes per session: 0 min    Stress: Rather much   Relationships    Social connections:     Talks on phone: More than three times a week     Gets together: Three times a week     Attends Jehovah's witness service: Not on file     Active member of club or organization: No     Attends meetings of clubs or organizations: Never     Relationship status:    Other Topics Concern    Not on file   Social History Narrative    Not on file         ROS:  General ROS: negative for - chills, fatigue or fever  Cardiovascular ROS: no chest pain or dyspnea on exertion  Musculoskeletal ROS: negative for - joint pain or joint stiffness   Skin: Negative for rash, negative for nail or hair changes. Positive for  Corn/callus on the foot.         EXAM:  Vitals:    10/14/19 1415   BP: 121/67   BP Location: Right arm   Patient Position: Sitting   BP Method:  Medium (Automatic)   Pulse: 67        General: alert and orient x 3, well-developed, well-nourished and in no apparent distress.    Vasc: Palpable pedal pulses, feet appropriately warm to touch. Capillary refill time within normal limits.   Neuro: Epicritic sensation intact.  No focal deficits. No Tinels.   MSK: bilateral mild bunion deformity, minimal pes planus, arch still maintained weight bearing. Minimal equinus bilateral   Derm:  Multiple Nucleated porokeratosis, bilateral sub 2nd met head.  Tender to palpation.  No other open lesions, macerations, or rashes noted.   No redness or swelling           Assessment / Plan:    Problem List Items Addressed This Visit     None      Visit Diagnoses     Plantar porokeratosis, acquired - Left Foot    -  Primary          I counseled the patient on her conditions, their implications and medical management.     Plantar porokeratosis, acquired - Left Foot    - continue custom foot orthotics or consider Spenco orthotic Arch (OTC)  - calluses trimmed as a courtesy  - never walk barefoot  - moisturize feet daily    Call or return to clinic prn if these symptoms worsen or fail to improve as anticipated.

## 2019-10-23 ENCOUNTER — CLINICAL SUPPORT (OUTPATIENT)
Dept: REHABILITATION | Facility: OTHER | Age: 49
End: 2019-10-23
Payer: COMMERCIAL

## 2019-10-23 DIAGNOSIS — M54.2 NECK PAIN ON RIGHT SIDE: ICD-10-CM

## 2019-10-23 DIAGNOSIS — M54.50 CHRONIC LEFT-SIDED LOW BACK PAIN WITHOUT SCIATICA: ICD-10-CM

## 2019-10-23 DIAGNOSIS — G89.29 CHRONIC LEFT-SIDED LOW BACK PAIN WITHOUT SCIATICA: ICD-10-CM

## 2019-10-23 PROCEDURE — 97162 PT EVAL MOD COMPLEX 30 MIN: CPT | Mod: PN | Performed by: PHYSICAL THERAPIST

## 2019-10-23 NOTE — PLAN OF CARE
[facility]  Physical Therapy Initial Evaluation    Name: Deja Theodore  Clinic Number: [id number]    Therapy Diagnosis:   Encounter Diagnoses   Name Primary?    Neck pain on right side     Chronic left-sided low back pain without sciatica      Physician: Bella Ying PA-C    Physician Orders: PT Eval and Treat Lumbar protocol with home exercises. ROM, stretching lumbar and abdominal musculature. Aerobic condidtioning, aqua therapy , with modalities including ultrasound, massage, dry needling PRN. 2-3 X week for 6 weeks.   Medical Diagnosis from Referral: M43.16 (ICD-10-CM) - Spondylolisthesis of lumbar region M43.00 (ICD-10-CM) - Spondylolysis M54.16 (ICD-10-CM) - Lumbar radiculopathy M54.2 (ICD-10-CM) - Neck pain M50.30 (ICD-10-CM) - DDD (degenerative disc disease), cervical   Evaluation Date: 10/23/2019  Authorization Period Expiration: 12/31/2019  Plan of Care Expiration: 12/6/2019  Visit # / Visits authorized: 1/ 20    Time In: 8:10 AM  Time Out: 9:00 AM  Total Billable Time: 50 minutes    Precautions: Standard    Subjective   Date of onset: June  History of current condition - Nicole reports: pain to R side of neck since June. Chronic pain to L low back since her 20s, with multiple episodes of therapy of the years. Low back pain exacerbated in March -dx with endometriosis. Had hysterectomy which resolved some pain, but says low back pain never really calmed down from then. Denies radicular symptoms in legs. History of carpal tunnel syndrome to B wrists.        Past Medical History:   Diagnosis Date    Anemia     Arthritis     Carpal tunnel syndrome     Depression     Keratitis secondary to contact lens     OD    Other disorders of eyelid(374.89)     MGD    Spondylolisthesis      Deja Theodore  has a past surgical history that includes Tonsillectomy; Adenoidectomy; Nose surgery; Diagnostic laparoscopy (N/A, 3/18/2019); Partial hysterectomy (4/15/2019); and  Hysterectomy.    Deja has a current medication list which includes the following prescription(s): hydroxyzine, ibuprofen, propranolol, sertraline, and urea.    Review of patient's allergies indicates:   Allergen Reactions    No known allergies         Imaging, bone scan films: FINDINGS:  Vertebral bodies are intact.  Mild narrowing of the C 5-C6 disc space is seen.  No instability in flexion and extension       Prior Therapy: multiple episodes for low back, none for neck  Social History: Pt lives with their family. Does yoga regularly, and swims. 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 No difficulty driving. I with ADL's. Difficulty carrying in heavy groceries (does grocery delivery due to pain with pushing cart).    Pain:  Neck: Current 5/10, worst 8/10, best 4/10   Low back: Current 7/10, worst 9/10, best 6/10  Location: R neck, L low back  Description: Aching, Dull and Sharp  Aggravating Factors: prolonged positions, lifting, walking  Easing Factors: heating pad and rest    Pts goals: get rid of neck pain    Objective   Observation: pt is alert and oriented, good historian    Cervical Range of Motion:    % WFL Pain   Flexion 75%      Extension WFL relieves     Right Rotation WFL      Left Rotation WFL      Right Side Bending 50% Pain R   Left Side Bending 50% Tight R      Shoulder Range of Motion: WFL and equal B UE      Upper Extremity Strength  (R) UE  (L) UE    Shoulder flexion: 4/5 Shoulder flexion: 4+/5   Shoulder Abduction: 4/5 Shoulder abduction: 4+/5   Shoulder ER 4+/5 Shoulder ER 5/5   Shoulder IR 4+/5 Shoulder IR 5/5   Elbow flexion: 4+/5 Elbow flexion: 5/5   Elbow extension: 4+/5 Elbow extension: 5/5   Lower Trap 4/5 Lower Trap 4+/5   Middle Trap 4/5 Middle Trap 4+/5   Rhomboids 4/5 Rhomboids 4+/5          Special Tests:  Distraction relieves   Compression -   Spurlings -   Sharp-Arsh -   Lateral Flexion Alar Ligament -         Palpation: tenderness to B UT, LS, cervical PVM, suboccipitals (R>L)      Sensation: grossly intact to light touch B UE    Flexibility: restriction to B UT and LS (R>L)      Lumbar Range of Motion:    Percent WFL Pain   Flexion WFL   No change        Extension WFL           Left Side Bending WFL         Right Side Bending WFL         Left rotation   WFL         Right Rotation   WFL              Lower Extremity Strength  Right LE  Left LE    Ankle dorsiflexion: 5/5 Ankle dorsiflexion: 5/5   Knee extension: 5/5 Knee extension: 5/5   Knee flexion: 5/5 Knee flexion: 5/5   Hip flexion: 5/5 Hip flexion: 5/5   Hip external rotation: 4/5 Hip external rotation: 4+/5   Hip internal rotation: 4-/5 Hip internal rotation: 4-/5   Hip extension:  4+/5 Hip extension: 4+/5   Hip abduction: 5/5 Hip abduction: 5/5   Hip adduction: 4-/5 Hip adduction 4/5           Neuro Dynamic Testing:    Sciatic nerve:      SLR: R = -     L = -    Palpation: tenderness to L piriformis, PSIS, glut med    Sensation: grossly intact to light touch B LE    Flexibility:    Hamstring: R = WFL; L = WFL   Quad: R = slight; L = WFL   Piriformis: R: slight; L WFL            CMS Impairment/Limitation/Restriction for FOTO Neck Survey    Therapist reviewed FOTO scores for Deja Theodore on 10/23/2019.   FOTO documents entered into EPIC - see Media section.    Limitation Score: 42%  Category: Body Position    Current : CK = at least 40% but < 60% impaired, limited or restricted  Goal: CJ = at least 20% but < 40% impaired, limited or restricted (38%)  Discharge: n/a         CMS Impairment/Limitation/Restriction for FOTO Lumbar Spine Survey    Therapist reviewed FOTO scores for Deja Theodore on 10/23/2019.   FOTO documents entered into EPIC - see Media section.    Limitation Score: 56%  Category: Mobility    Current : CK = at  least 40% but < 60% impaired, limited or restricted  Goal: CK = at least 40% but < 60% impaired, limited or restricted  Discharge: n/a           TREATMENT   Treatment Time In: 8:55 AM  Treatment Time Out: 9:00  Total Treatment time separate from Evaluation: 5 minutes    Nicole received therapeutic exercises to develop strength and posture for 5 minutes including:  Reviewed and demonstrated for HEP: chin tucks and scap squeezes  .    Home Exercises and Patient Education Provided    Education provided:   - Therapy rationale and plan of care    Written Home Exercises Provided: yes.  Exercises were reviewed and Nicole was able to demonstrate them prior to the end of the session.  Nicole demonstrated good  understanding of the education provided.     See EMR under Patient Instructions for exercises provided 10/23/2019.    Assessment   Deja is a 49 y.o. female referred to outpatient Physical Therapy with a medical diagnosis of M43.16 (ICD-10-CM) - Spondylolisthesis of lumbar region M43.00 (ICD-10-CM) - Spondylolysis M54.16 (ICD-10-CM) - Lumbar radiculopathy M54.2 (ICD-10-CM) - Neck pain M50.30 (ICD-10-CM) - DDD (degenerative disc disease), cervical. Pt presents with report of chronic low back pain flared earlier this year following episode of endometriosis followed by hysterectomy. More recent onset of R sided cervical pain exacerbated by stress and recently starting graduate school program. Weakness to B hips with MMT. Trigger point tenderness to L glut medius and pirifomis, and B UT, LS, suboccipitals (R>L).     Pt prognosis is Good.   Pt will benefit from skilled outpatient Physical Therapy to address the deficits stated above and in the chart below, provide pt/family education, and to maximize pt's level of independence.     Plan of care discussed with patient: Yes  Pt's spiritual, cultural and educational needs considered and patient is agreeable to the plan of care and goals as stated below:     Anticipated Barriers for  therapy: school schedule and childcare    Medical Necessity is demonstrated by the following  History  Co-morbidities and personal factors that may impact the plan of care Co-morbidities:   depression, prior abdominal surgery and spondylolisthesis    Personal Factors:   social background  lifestyle  character  attitudes     moderate   Examination  Body Structures and Functions, activity limitations and participation restrictions that may impact the plan of care Body Regions:   neck  back  lower extremities  upper extremities  trunk    Body Systems:    gross symmetry  ROM  strength  transfers  transitions    Participation Restrictions:   Standing, lifting, prolonged positions    Activity limitations:   Learning and applying knowledge  no deficits    General Tasks and Commands  no deficits    Communication  no deficits    Mobility  lifting and carrying objects    Self care  no deficits    Domestic Life  shopping  cooking  doing house work (cleaning house, washing dishes, laundry)    Interactions/Relationships  no deficits    Life Areas  no deficits    Community and Social Life  community life  recreation and leisure         moderate   Clinical Presentation evolving clinical presentation with changing clinical characteristics moderate   Decision Making/ Complexity Score: moderate     Goals:  Short Term Goals (4 Weeks):   1. Pt will report 20% reduction in pain of the R CSP and L LSP for ease with ADL's  2. PT will demonstrate improved upright posture with minimal cuing for ease with functional positioning in home and community.      Long Term Goals (8 Weeks):   1. Pt will report being independent with HEP for maintenance of improvements gained during therapy sessions  2. PT will report 50% reduction of pain of the neck and low back for ease with household chores.   3. Pt will demonstrate trunk and extremity strength to >=4+/5 without the provocation of pain for ease with yoga  4. Pt will demonstrate appropriate upright  posture without external cueing for ease with schoolwork.   5. Pt to demonstrate improved functional ability with FOTO limitation <=20-40% disability.    Plan   Plan of care Certification: 10/23/2019 to 12/6/2019.    Outpatient Physical Therapy 2 times weekly for 6 weeks to include the following interventions: Cervical/Lumbar Traction, Manual Therapy, Moist Heat/ Ice, Patient Education, Therapeutic Exercise and Dry Needling.     Tiffani Love, PT

## 2019-10-28 ENCOUNTER — PATIENT MESSAGE (OUTPATIENT)
Dept: REHABILITATION | Facility: OTHER | Age: 49
End: 2019-10-28

## 2019-11-05 ENCOUNTER — PATIENT MESSAGE (OUTPATIENT)
Dept: OBSTETRICS AND GYNECOLOGY | Facility: CLINIC | Age: 49
End: 2019-11-05

## 2019-11-06 ENCOUNTER — OFFICE VISIT (OUTPATIENT)
Dept: OBSTETRICS AND GYNECOLOGY | Facility: CLINIC | Age: 49
End: 2019-11-06
Attending: OBSTETRICS & GYNECOLOGY
Payer: COMMERCIAL

## 2019-11-06 VITALS
HEIGHT: 62 IN | BODY MASS INDEX: 21.14 KG/M2 | WEIGHT: 114.88 LBS | DIASTOLIC BLOOD PRESSURE: 60 MMHG | SYSTOLIC BLOOD PRESSURE: 100 MMHG

## 2019-11-06 DIAGNOSIS — Z12.39 BREAST CANCER SCREENING: ICD-10-CM

## 2019-11-06 DIAGNOSIS — Z01.419 WELL FEMALE EXAM WITH ROUTINE GYNECOLOGICAL EXAM: Primary | ICD-10-CM

## 2019-11-06 PROCEDURE — 99999 PR PBB SHADOW E&M-EST. PATIENT-LVL III: CPT | Mod: PBBFAC,,, | Performed by: OBSTETRICS & GYNECOLOGY

## 2019-11-06 PROCEDURE — 99999 PR PBB SHADOW E&M-EST. PATIENT-LVL III: ICD-10-PCS | Mod: PBBFAC,,, | Performed by: OBSTETRICS & GYNECOLOGY

## 2019-11-06 PROCEDURE — 99396 PR PREVENTIVE VISIT,EST,40-64: ICD-10-PCS | Mod: S$GLB,,, | Performed by: OBSTETRICS & GYNECOLOGY

## 2019-11-06 PROCEDURE — 99396 PREV VISIT EST AGE 40-64: CPT | Mod: S$GLB,,, | Performed by: OBSTETRICS & GYNECOLOGY

## 2019-11-06 NOTE — PROGRESS NOTES
SUBJECTIVE:   49 y.o. female   for routine gyn exam. Patient's last menstrual period was 2019 (approximate)..  She had supracervical hyst / BSO for endo.  She is not taking HRT.  She has no unusual complaints          Past Medical History:   Diagnosis Date    Anemia     Arthritis     Carpal tunnel syndrome     Depression     Keratitis secondary to contact lens     OD    Other disorders of eyelid(374.89)     MGD    Spondylolisthesis      Past Surgical History:   Procedure Laterality Date    ADENOIDECTOMY      DIAGNOSTIC LAPAROSCOPY N/A 3/18/2019    Procedure: LAPAROSCOPY, DIAGNOSTIC;  Surgeon: Peggy Carrero MD;  Location: [facility];  Service: OB/GYN;  Laterality: N/A;    HYSTERECTOMY      NOSE SURGERY      Age 18-cosmetic    PARTIAL HYSTERECTOMY  4/15/2019    Procedure: HYSTERECTOMY, SUPRACERVICAL;  Surgeon: Peggy Carrero MD;  Location: [facility];  Service: OB/GYN;;  with bilateral salpingo-oopherectomy    TONSILLECTOMY       Social History     Socioeconomic History    Marital status: Legally      Spouse name: Not on file    Number of children: 2    Years of education: Not on file    Highest education level: Not on file   Occupational History     Employer: not employed   Social Needs    Financial resource strain: Not hard at all    Food insecurity:     Worry: Never true     Inability: Never true    Transportation needs:     Medical: No     Non-medical: No   Tobacco Use    Smoking status: Former Smoker     Packs/day: 0.30     Years: 10.00     Pack years: 3.00     Types: Cigarettes     Last attempt to quit: 2005     Years since quittin.1    Smokeless tobacco: Never Used   Substance and Sexual Activity    Alcohol use: Not Currently     Frequency: Never     Binge frequency: Never     Comment: Rare    Drug use: No    Sexual activity: Not Currently     Partners: Male     Birth control/protection: None   Lifestyle    Physical activity:     Days per week:  0 days     Minutes per session: 0 min    Stress: Rather much   Relationships    Social connections:     Talks on phone: More than three times a week     Gets together: Three times a week     Attends Spiritism service: Not on file     Active member of club or organization: No     Attends meetings of clubs or organizations: Never     Relationship status:    Other Topics Concern    Not on file   Social History Narrative    Not on file     Family History   Problem Relation Age of Onset    Depression Mother     Mental illness Mother     Hypertension Mother     Hypertension Father     Cancer Sister     Asthma Brother     Mental illness Brother     Asthma Son     Amblyopia Neg Hx     Blindness Neg Hx     Cataracts Neg Hx     Diabetes Neg Hx     Glaucoma Neg Hx     Macular degeneration Neg Hx     Retinal detachment Neg Hx     Strabismus Neg Hx     Stroke Neg Hx     Thyroid disease Neg Hx     Breast cancer Neg Hx      OB History    Para Term  AB Living   1 1 1 0   1   SAB TAB Ectopic Multiple Live Births           1      # Outcome Date GA Lbr Khurram/2nd Weight Sex Delivery Anes PTL Lv   1 Term 11/    M Vag-Spont   ABUNDIO         Current Outpatient Medications   Medication Sig Dispense Refill    hydrOXYzine (ATARAX) 50 MG tablet TK 1 T PO  HS PRN FOR INSOMNIA  0    urea 10 % Lotn Apply 1 application topically once daily. To dry skin on the feet. 177 mL 10    ibuprofen (ADVIL,MOTRIN) 600 MG tablet TAKE 1 TABLET(600 MG) BY MOUTH EVERY 6 HOURS AS NEEDED 30 tablet 0    propranolol (INDERAL) 10 MG tablet TK 1/2 TO 1 T PO QAM  0    sertraline (ZOLOFT) 100 MG tablet Take 200 mg by mouth once daily.       No current facility-administered medications for this visit.      Allergies: No known allergies     ROS:  Constitutional: no weight loss, weight gain, fever, fatigue  Eyes:  No vision changes, glasses/contacts  ENT/Mouth: No ulcers, sinus problems, ears ringing,  "headache  Cardiovascular: No inability to lie flat, chest pain, exercise intolerance, swelling, heart palpitations  Respiratory: No wheezing, coughing blood, shortness of breath, or cough  Gastrointestinal: No diarrhea, bloody stool, nausea/vomiting, constipation, gas, hemorrhoids  Genitourinary: No blood in urine, painful urination, urgency of urination, frequency of urination, incomplete emptying, incontinence, abnormal bleeding, painful periods, heavy periods, vaginal discharge, vaginal odor, painful intercourse, sexual problems, bleeding after intercourse.  Musculoskeletal: No muscle weakness  Skin/Breast: No painful breasts, nipple discharge, masses, rash, ulcers  Neurological: No passing out, seizures, numbness, headache  Endocrine: No diabetes, hypothyroid, hyperthyroid, hot flashes, hair loss, abnormal hair growth, ance  Psychiatric: No depression, crying  Hematologic: No bruises, bleeding, swollen lymph nodes, anemia.      OBJECTIVE:   The patient appears well, alert, oriented x 3, in no distress.  /60   Ht 5' 2" (1.575 m)   Wt 52.1 kg (114 lb 13.8 oz)   LMP 03/18/2019 (Approximate)   BMI 21.01 kg/m²   NECK: no thyromegaly, trachea midline  SKIN: no acne, striae, hirsutism  CHEST: CTAB  CV: RRR  BREAST EXAM: breasts appear normal, no suspicious masses, no skin or nipple changes or axillary nodes  ABDOMEN: no hernias, masses, or hepatosplenomegaly  GENITALIA: normal external genitalia, no erythema, no discharge  URETHRA: normal urethra, normal urethral meatus  VAGINA: Normal  CERVIX: no lesions or cervical motion tenderness  UTERUS: absent  ADNEXA: no mass, fullness, tenderness      ASSESSMENT:   1. Well female exam with routine gynecological exam     2. Breast cancer screening  Mammo Digital Screening Bilat w/ Deniz       PLAN:   Orders Placed This Encounter    Mammo Digital Screening Bilat w/ Deniz     Return to clinic in 1 year  "

## 2019-11-07 ENCOUNTER — PATIENT MESSAGE (OUTPATIENT)
Dept: REHABILITATION | Facility: OTHER | Age: 49
End: 2019-11-07

## 2019-11-13 ENCOUNTER — CLINICAL SUPPORT (OUTPATIENT)
Dept: REHABILITATION | Facility: OTHER | Age: 49
End: 2019-11-13
Payer: COMMERCIAL

## 2019-11-13 ENCOUNTER — HOSPITAL ENCOUNTER (OUTPATIENT)
Dept: RADIOLOGY | Facility: HOSPITAL | Age: 49
Discharge: HOME | End: 2019-11-13
Attending: OBSTETRICS & GYNECOLOGY
Payer: COMMERCIAL

## 2019-11-13 DIAGNOSIS — M54.2 NECK PAIN ON RIGHT SIDE: ICD-10-CM

## 2019-11-13 DIAGNOSIS — G89.29 CHRONIC LEFT-SIDED LOW BACK PAIN WITHOUT SCIATICA: ICD-10-CM

## 2019-11-13 DIAGNOSIS — M54.50 CHRONIC LEFT-SIDED LOW BACK PAIN WITHOUT SCIATICA: ICD-10-CM

## 2019-11-13 DIAGNOSIS — Z12.39 BREAST CANCER SCREENING: ICD-10-CM

## 2019-11-13 PROCEDURE — 77063 BREAST TOMOSYNTHESIS BI: CPT | Mod: 26,,, | Performed by: RADIOLOGY

## 2019-11-13 PROCEDURE — 77067 SCR MAMMO BI INCL CAD: CPT | Mod: 26,,, | Performed by: RADIOLOGY

## 2019-11-13 PROCEDURE — 97110 THERAPEUTIC EXERCISES: CPT | Mod: PN

## 2019-11-13 PROCEDURE — 77063 BREAST TOMOSYNTHESIS BI: CPT | Mod: TC

## 2019-11-13 PROCEDURE — 97140 MANUAL THERAPY 1/> REGIONS: CPT | Mod: PN

## 2019-11-13 PROCEDURE — 77063 MAMMO DIGITAL SCREENING BILAT WITH TOMOSYNTHESIS_CAD: ICD-10-PCS | Mod: 26,,, | Performed by: RADIOLOGY

## 2019-11-13 PROCEDURE — 77067 MAMMO DIGITAL SCREENING BILAT WITH TOMOSYNTHESIS_CAD: ICD-10-PCS | Mod: 26,,, | Performed by: RADIOLOGY

## 2019-11-13 NOTE — PROGRESS NOTES
"  Physical Therapy Daily Treatment Note     Name: Deja Theodore  Clinic Number: [id number]    Therapy Diagnosis:   Encounter Diagnoses   Name Primary?    Neck pain on right side     Chronic left-sided low back pain without sciatica      Physician: Bella Ying PA-C    Visit Date: 11/13/2019    Physician Orders: PT Eval and Treat Lumbar protocol with home exercises. ROM, stretching lumbar and abdominal musculature. Aerobic condidtioning, aqua therapy , with modalities including ultrasound, massage, dry needling PRN. 2-3 X week for 6 weeks.    Medical Diagnosis: M43.16 (ICD-10-CM) - Spondylolisthesis of lumbar region M43.00 (ICD-10-CM) - Spondylolysis M54.16 (ICD-10-CM) - Lumbar radiculopathy M54.2 (ICD-10-CM) - Neck pain M50.30 (ICD-10-CM) - DDD (degenerative disc disease), cervical    Evaluation Date: 10/23/2019  Authorization Period Expiration: 12/31/2019  Plan of Care Certification Period: 12/6/2019  Visit #/Visits authorized: 2/20     Time In: 12:15 PM (pt was accommodated for an appt today)  Time Out: 1:00 PM  Total Billable Time: 40 minutes    Precautions: Standard    Subjective     Pt reports: having increased pain with chin retractions. States she is unable to rotate her head without pain. States she cannot use any bands for exercises due to her carpal tunnel.   She was compliant with home exercise program.  Response to previous treatment: no new changes  Functional change: no new changes     Pain: 8/10  Location: bilateral neck, lower back    Objective     Nicole received therapeutic exercises to develop strength, endurance, ROM, flexibility, posture and core stabilization for 30 minutes including:  Chin tucks 15 x 5" (nt)  Scap retractions 15 x 5"  UT stretch 3 x 20" (increased L neck pain)   1/2 foam pec stretch (ceased due to increased numbness to fingers)  Supine serratus punch 10 x 3"  Supine no moneys (unable to do)  Prone retractions 15 x 5"  Prone I's (unable to do due to c/o increased " "numbness from carpal tunnel)    TA bracing 10 x 5"  TA bracing w/ adduction ball squeeze 10 x 5"  Piriformis stretch 3 x 30"     Nicole received the following manual therapy techniques: Myofacial release and Soft tissue Mobilization were applied to the: neck for 10 minutes, including:  STM to cervical paraspinals  STM to suboccipital  STM to B UT     Nicole received hot pack for 10 minutes to neck / LB.      Home Exercises Provided and Patient Education Provided     Education provided:   - importance of neck / scapular stabilization exercises for decreased neck pain, proper form and technique, rationale behind each ex     Written Home Exercises Provided: Patient instructed to cont prior HEP.  Exercises were reviewed and Nicole was able to demonstrate them prior to the end of the session.  Nicole demonstrated fair  understanding of the education provided.     See EMR under Media for exercises provided prior visit.    Assessment     Poor tolerance to initial treatment session today. Shoulder / neck stabilization exercises limited today due to frequent reports of increased neck pain and increased numbness to B fingers. Tenderness noted with palpation to suboccipital and UT region. Muscular guarding noted to B UT with palpation.    Nicole is progressing well towards her goals.   Pt prognosis is Fair.     Pt will continue to benefit from skilled outpatient physical therapy to address the deficits listed in the problem list box on initial evaluation, provide pt/family education and to maximize pt's level of independence in the home and community environment.     Pt's spiritual, cultural and educational needs considered and pt agreeable to plan of care and goals.     Anticipated barriers to physical therapy: school schedule and childcare     Goals:   Short Term Goals (4 Weeks):   1. Pt will report 20% reduction in pain of the R CSP and L LSP for ease with ADL's (progressing, not met)  2. PT will demonstrate improved upright posture with " minimal cuing for ease with functional positioning in home and community. (progressing, not met)         Long Term Goals (8 Weeks):   1. Pt will report being independent with HEP for maintenance of improvements gained during therapy sessions (progressing, not met)   2. PT will report 50% reduction of pain of the neck and low back for ease with household chores.  (progressing, not met)   3. Pt will demonstrate trunk and extremity strength to >=4+/5 without the provocation of pain for ease with yoga (progressing, not met)   4. Pt will demonstrate appropriate upright posture without external cueing for ease with schoolwork.  (progressing, not met)   5. Pt to demonstrate improved functional ability with FOTO limitation <=20-40% disability. (progressing, not met)     Plan     Continue with established PT Plan of Care and focus on cervical / scapular stabilization, core strengthening, and manual therapy.     Aime Riley, PTA

## 2019-11-18 ENCOUNTER — CLINICAL SUPPORT (OUTPATIENT)
Dept: REHABILITATION | Facility: OTHER | Age: 49
End: 2019-11-18
Payer: COMMERCIAL

## 2019-11-18 DIAGNOSIS — M54.2 NECK PAIN ON RIGHT SIDE: ICD-10-CM

## 2019-11-18 DIAGNOSIS — G89.29 CHRONIC LEFT-SIDED LOW BACK PAIN WITHOUT SCIATICA: ICD-10-CM

## 2019-11-18 DIAGNOSIS — M54.50 CHRONIC LEFT-SIDED LOW BACK PAIN WITHOUT SCIATICA: ICD-10-CM

## 2019-11-18 PROCEDURE — 97140 MANUAL THERAPY 1/> REGIONS: CPT | Mod: PN | Performed by: PHYSICAL THERAPIST

## 2019-11-18 NOTE — PROGRESS NOTES
"  Physical Therapy Daily Treatment Note     Name: Deja Theodore  Clinic Number: [id number]    Therapy Diagnosis:   Encounter Diagnoses   Name Primary?    Neck pain on right side     Chronic left-sided low back pain without sciatica      Physician: Bella Ying PA-C    Visit Date: 11/18/2019    Physician Orders: PT Eval and Treat Lumbar protocol with home exercises. ROM, stretching lumbar and abdominal musculature. Aerobic condidtioning, aqua therapy , with modalities including ultrasound, massage, dry needling PRN. 2-3 X week for 6 weeks.    Medical Diagnosis: M43.16 (ICD-10-CM) - Spondylolisthesis of lumbar region M43.00 (ICD-10-CM) - Spondylolysis M54.16 (ICD-10-CM) - Lumbar radiculopathy M54.2 (ICD-10-CM) - Neck pain M50.30 (ICD-10-CM) - DDD (degenerative disc disease), cervical    Evaluation Date: 10/23/2019  Authorization Period Expiration: 12/31/2019  Plan of Care Certification Period: 12/6/2019  Visit #/Visits authorized: 3/20     Time In: 9:05 AM  Time Out: 10:05 AM  Total Billable Time: 60 minutes    Precautions: Standard    Subjective     Pt reports: back feels better with yoga stretches, but says "the neck is a tragedy."   She was compliant with home exercise program.  Response to previous treatment: no new changes  Functional change: no new changes     Pain: 8/10  Location: bilateral neck, lower back    Objective     Nicole received therapeutic exercises to develop strength, endurance, ROM, flexibility, posture and core stabilization for 10 minutes including:    Not performed today  Chin tucks 15 x 5" (nt)  Scap retractions 15 x 5"  UT stretch 3 x 20" (increased L neck pain)   1/2 foam pec stretch (ceased due to increased numbness to fingers)  Supine serratus punch 10 x 3"  Supine no moneys (unable to do)  Prone retractions 15 x 5"  Prone I's (unable to do due to c/o increased numbness from carpal tunnel)    TA bracing 10 x 5"  TA bracing w/ adduction ball squeeze 10 x 5"  Piriformis stretch 3 x " "30"     Nicole received the following manual therapy techniques: Myofacial release and Soft tissue Mobilization were applied to the: neck for 40 minutes, includin min x Application of TDN: Pt educated on benefits and potential side effects of dry needling. Educated pt on benefits, precautions, side effects following TDN. Educated pt to use heat following treatment sessions if pt is experiencing pain or soreness. Pt verbalized good understanding of education.  Pt signed written consent to dry needling. Pt gave verbal consent for DN    Pt received dry needling to the below listed muscles using 30 and 60 mm needles.  CSP: B UT, LS, suboccipitals (GB20), PVM C3 and C5  LSP: L glut med, PSIS, proximal and distal piriformis  Winding performed every 5 minutes during treatment.     10 min x MET to CSP to improve lateral bending and rotation    Nicole received hot pack for 10 minutes to neck / LB.      Home Exercises Provided and Patient Education Provided     Education provided:   - importance of neck / scapular stabilization exercises for decreased neck pain, proper form and technique, rationale behind each ex     Written Home Exercises Provided: Patient instructed to cont prior HEP.  Exercises were reviewed and Nicole was able to demonstrate them prior to the end of the session.  Nicole demonstrated fair  understanding of the education provided.     See EMR under Patient Instructions for exercises provided 10/23/2019.    Assessment     Exercises held today due to c/o pain. Cervical ROM performed before and after manual intervention, with some improvement noted though pt continues to demonstrate restrictions particularly in lateral bending and flexion. Dry needling initiated today with pt's written and verbal consent. Good soft tissue response to dry needling evident by increased grasp with unilateral winding at all insertion points. Winding performed every 5 minutes during treatment. No adverse effects following treatment.     "   Nicole is progressing well towards her goals.   Pt prognosis is Fair.     Pt will continue to benefit from skilled outpatient physical therapy to address the deficits listed in the problem list box on initial evaluation, provide pt/family education and to maximize pt's level of independence in the home and community environment.     Pt's spiritual, cultural and educational needs considered and pt agreeable to plan of care and goals.     Anticipated barriers to physical therapy: school schedule and childcare     Goals: IE 10/23/2019  Short Term Goals (4 Weeks):   1. Pt will report 20% reduction in pain of the R CSP and L LSP for ease with ADL's (progressing, not met)  2. PT will demonstrate improved upright posture with minimal cuing for ease with functional positioning in home and community. (progressing, not met)         Long Term Goals (8 Weeks):   1. Pt will report being independent with HEP for maintenance of improvements gained during therapy sessions (progressing, not met)   2. PT will report 50% reduction of pain of the neck and low back for ease with household chores.  (progressing, not met)   3. Pt will demonstrate trunk and extremity strength to >=4+/5 without the provocation of pain for ease with yoga (progressing, not met)   4. Pt will demonstrate appropriate upright posture without external cueing for ease with schoolwork.  (progressing, not met)   5. Pt to demonstrate improved functional ability with FOTO limitation <=20-40% disability. (progressing, not met)     Plan     Continue with established PT Plan of Care and focus on cervical / scapular stabilization, core strengthening, and manual therapy.     Tiffani Love, PT

## 2019-11-20 ENCOUNTER — TELEPHONE (OUTPATIENT)
Dept: OPTOMETRY | Facility: CLINIC | Age: 49
End: 2019-11-20

## 2019-11-20 ENCOUNTER — PATIENT MESSAGE (OUTPATIENT)
Dept: OPTOMETRY | Facility: CLINIC | Age: 49
End: 2019-11-20

## 2019-11-20 NOTE — TELEPHONE ENCOUNTER
CLRx per below for daily disposal/replacement    -Advised against overnight wear, risks of overnight wear explained;     -Optive artificial tears for comfort prn;    -Optifree Puremoist solutions recommended            Contact Lens Final Rx     Final Contact Lens Rx       Brand Base Curve Diameter Sphere Add    Right Dailies Total 1 Multifocal 8.50 14.1 -5.00 Medium    Left Dailies Total 1 Multifocal 8.50 14.1 -5.75 Medium    Expiration Date:  11/20/2020    Replacement:  Daily    Solutions:  OptiFree PureMoist    Wearing Schedule:  Daily wear

## 2019-11-24 ENCOUNTER — PATIENT MESSAGE (OUTPATIENT)
Dept: REHABILITATION | Facility: OTHER | Age: 49
End: 2019-11-24

## 2019-12-02 ENCOUNTER — CLINICAL SUPPORT (OUTPATIENT)
Dept: REHABILITATION | Facility: OTHER | Age: 49
End: 2019-12-02
Payer: COMMERCIAL

## 2019-12-02 DIAGNOSIS — M54.2 NECK PAIN ON RIGHT SIDE: ICD-10-CM

## 2019-12-02 DIAGNOSIS — M54.50 CHRONIC LEFT-SIDED LOW BACK PAIN WITHOUT SCIATICA: ICD-10-CM

## 2019-12-02 DIAGNOSIS — G89.29 CHRONIC LEFT-SIDED LOW BACK PAIN WITHOUT SCIATICA: ICD-10-CM

## 2019-12-02 PROCEDURE — 97140 MANUAL THERAPY 1/> REGIONS: CPT | Mod: PN | Performed by: PHYSICAL THERAPIST

## 2019-12-02 NOTE — PROGRESS NOTES
"  Physical Therapy Daily Treatment Note     Name: Deja Theodore  Clinic Number: [id number]    Therapy Diagnosis:   Encounter Diagnoses   Name Primary?    Neck pain on right side     Chronic left-sided low back pain without sciatica      Physician: Bella Ying PA-C    Visit Date: 12/2/2019    Physician Orders: PT Eval and Treat Lumbar protocol with home exercises. ROM, stretching lumbar and abdominal musculature. Aerobic condidtioning, aqua therapy , with modalities including ultrasound, massage, dry needling PRN. 2-3 X week for 6 weeks.    Medical Diagnosis: M43.16 (ICD-10-CM) - Spondylolisthesis of lumbar region M43.00 (ICD-10-CM) - Spondylolysis M54.16 (ICD-10-CM) - Lumbar radiculopathy M54.2 (ICD-10-CM) - Neck pain M50.30 (ICD-10-CM) - DDD (degenerative disc disease), cervical    Evaluation Date: 10/23/2019  Authorization Period Expiration: 12/31/2019  Plan of Care Certification Period: 12/6/2019  Visit #/Visits authorized: 4/20     Time In: 8:05 AM  Time Out: 9:05 AM  Total Billable Time: 60 minutes    Precautions: Standard    Subjective     Pt reports: she had some relief after last session, but neck pain returned with schoolwork. She says low back is not hurting today as she has not been standing and walking much.   She was compliant with home exercise program.  Response to previous treatment: no new changes  Functional change: no new changes     Pain: 8/10  Location: bilateral neck, lower back    Objective     Nicole received therapeutic exercises to develop strength, endurance, ROM, flexibility, posture and core stabilization for 20 minutes including:  Chin tucks 15 x 5"   Scap retractions 15 x 5"    Not performed today  UT stretch 3 x 20" (increased L neck pain)   1/2 foam pec stretch (ceased due to increased numbness to fingers)  Supine serratus punch 10 x 3"  Supine no moneys (unable to do)  Prone retractions 15 x 5"  Prone I's (unable to do due to c/o increased numbness from carpal tunnel)    TA " "bracing 10 x 5"  TA bracing w/ adduction ball squeeze 10 x 5"  Piriformis stretch 3 x 30"     Nicole received the following manual therapy techniques: Myofacial release and Soft tissue Mobilization were applied to the: neck for 30 minutes, includin min x Application of TDN: Pt educated on benefits and potential side effects of dry needling. Educated pt on benefits, precautions, side effects following TDN. Educated pt to use heat following treatment sessions if pt is experiencing pain or soreness. Pt verbalized good understanding of education.  Pt signed written consent to dry needling. Pt gave verbal consent for DN    Pt received dry needling to the below listed muscles using 30 and 60 mm needles.  CSP: B UT, LS, rhomboid, middle trap, suboccipitals (GB20), L PVM C3 and C5  NP today: LSP: L glut med, PSIS, proximal and distal piriformis  Winding performed every 5 minutes during treatment.     00 min x MET to CSP to improve lateral bending and rotation    Nicole received hot pack for 10 minutes to neck / LB.      Home Exercises Provided and Patient Education Provided     Education provided:   - importance of neck / scapular stabilization exercises for decreased neck pain, proper form and technique, rationale behind each ex     Written Home Exercises Provided: Patient instructed to cont prior HEP.  Exercises were reviewed and Nicole was able to demonstrate them prior to the end of the session.  Nicole demonstrated fair  understanding of the education provided.     See EMR under Patient Instructions for exercises provided 10/23/2019.    Assessment     Good tolerance to treatment today. Good tolerance to dry needling, with good soft tissue response noted at all insertion points. Improved lateral bending to CSP noted following needling and moist heat, continues with limitations with R lateral bending.        Nicole is progressing well towards her goals.   Pt prognosis is Fair.     Pt will continue to benefit from skilled " outpatient physical therapy to address the deficits listed in the problem list box on initial evaluation, provide pt/family education and to maximize pt's level of independence in the home and community environment.     Pt's spiritual, cultural and educational needs considered and pt agreeable to plan of care and goals.     Anticipated barriers to physical therapy: school schedule and childcare     Goals: IE 10/23/2019  Short Term Goals (4 Weeks):   1. Pt will report 20% reduction in pain of the R CSP and L LSP for ease with ADL's (progressing, not met)  2. PT will demonstrate improved upright posture with minimal cuing for ease with functional positioning in home and community. (progressing, not met)         Long Term Goals (8 Weeks):   1. Pt will report being independent with HEP for maintenance of improvements gained during therapy sessions (progressing, not met)   2. PT will report 50% reduction of pain of the neck and low back for ease with household chores.  (progressing, not met)   3. Pt will demonstrate trunk and extremity strength to >=4+/5 without the provocation of pain for ease with yoga (progressing, not met)   4. Pt will demonstrate appropriate upright posture without external cueing for ease with schoolwork.  (progressing, not met)   5. Pt to demonstrate improved functional ability with FOTO limitation <=20-40% disability. (progressing, not met)     Plan     Continue with established PT Plan of Care and focus on cervical / scapular stabilization, core strengthening, and manual therapy.     Tiffani Love, PT

## 2019-12-11 ENCOUNTER — CLINICAL SUPPORT (OUTPATIENT)
Dept: REHABILITATION | Facility: OTHER | Age: 49
End: 2019-12-11
Payer: COMMERCIAL

## 2019-12-11 DIAGNOSIS — M54.2 NECK PAIN ON RIGHT SIDE: ICD-10-CM

## 2019-12-11 DIAGNOSIS — G89.29 CHRONIC LEFT-SIDED LOW BACK PAIN WITHOUT SCIATICA: ICD-10-CM

## 2019-12-11 DIAGNOSIS — M54.50 CHRONIC LEFT-SIDED LOW BACK PAIN WITHOUT SCIATICA: ICD-10-CM

## 2019-12-11 PROCEDURE — 97140 MANUAL THERAPY 1/> REGIONS: CPT | Mod: PN | Performed by: PHYSICAL THERAPIST

## 2019-12-11 NOTE — PROGRESS NOTES
"  Physical Therapy Daily Treatment Note     Name: Deja Theodore  Clinic Number: [id number]    Therapy Diagnosis:   Encounter Diagnoses   Name Primary?    Neck pain on right side     Chronic left-sided low back pain without sciatica      Physician: Bella Ying PA-C    Visit Date: 12/11/2019    Physician Orders: PT Eval and Treat Lumbar protocol with home exercises. ROM, stretching lumbar and abdominal musculature. Aerobic condidtioning, aqua therapy , with modalities including ultrasound, massage, dry needling PRN. 2-3 X week for 6 weeks.    Medical Diagnosis: M43.16 (ICD-10-CM) - Spondylolisthesis of lumbar region M43.00 (ICD-10-CM) - Spondylolysis M54.16 (ICD-10-CM) - Lumbar radiculopathy M54.2 (ICD-10-CM) - Neck pain M50.30 (ICD-10-CM) - DDD (degenerative disc disease), cervical    Evaluation Date: 10/23/2019  Authorization Period Expiration: 12/31/2019  Plan of Care Certification Period: 12/6/2019  Visit #/Visits authorized: 5/20     Time In: 8:05 AM  Time Out: 9:00 AM  Total Billable Time: 55 minutes    Precautions: Standard    Subjective     Pt reports: "It's a little better, but it still comes and goes."  She reports modifying study position, raising laptop up so she isn't looking down as much.  She feels dry needling has been helping a lot.   She was compliant with home exercise program.  Response to previous treatment: no new changes  Functional change: no new changes     Pain: 8/10  Location: bilateral neck, lower back    Objective     Nicole received therapeutic exercises to develop strength, endurance, ROM, flexibility, posture and core stabilization for 15 minutes including:  Chin tucks 15 x 5"   Scap retractions 15 x 5"  +Child's pose x 3 min    Not performed today  UT stretch 3 x 20" (increased L neck pain)   1/2 foam pec stretch (ceased due to increased numbness to fingers)  Supine serratus punch 10 x 3"  Supine no moneys (unable to do)  Prone retractions 15 x 5"  Prone I's (unable to do due " "to c/o increased numbness from carpal tunnel)    TA bracing 10 x 5"  TA bracing w/ adduction ball squeeze 10 x 5"  Piriformis stretch 3 x 30"     Nicole received the following manual therapy techniques: Myofacial release and Soft tissue Mobilization were applied to the: neck for 30 minutes, includin min x Application of TDN: Pt educated on benefits and potential side effects of dry needling. Educated pt on benefits, precautions, side effects following TDN. Educated pt to use heat following treatment sessions if pt is experiencing pain or soreness. Pt verbalized good understanding of education.  Pt signed written consent to dry needling. Pt gave verbal consent for DN    Pt received dry needling to the below listed muscles using 30 and 40 mm needles.  CSP: B UT, LS, rhomboid, middle trap, suboccipitals (GB20), L PVM C3 and C5    Winding performed every 5 minutes during treatment.     00 min x MET to CSP to improve lateral bending and rotation    Nicole received hot pack for 10 minutes to neck / LB.      Home Exercises Provided and Patient Education Provided     Education provided:   - importance of neck / scapular stabilization exercises for decreased neck pain, proper form and technique, rationale behind each ex     Written Home Exercises Provided: Patient instructed to cont prior HEP.  Exercises were reviewed and Nicole was able to demonstrate them prior to the end of the session.  Nicole demonstrated fair  understanding of the education provided.     See EMR under Patient Instructions for exercises provided 10/23/2019.    Assessment     Good tolerance to treatment. Pt reports relief of tightness with dry needling, and demonstrates improved cervical motion following treatment. Limited progression of therex due to time constraints. Pt educated on importance of continued postural strengthening with HEP.        Nicole is progressing well towards her goals.   Pt prognosis is Fair.     Pt will continue to benefit from skilled " outpatient physical therapy to address the deficits listed in the problem list box on initial evaluation, provide pt/family education and to maximize pt's level of independence in the home and community environment.     Pt's spiritual, cultural and educational needs considered and pt agreeable to plan of care and goals.     Anticipated barriers to physical therapy: school schedule and childcare     Goals: IE 10/23/2019  Short Term Goals (4 Weeks):   1. Pt will report 20% reduction in pain of the R CSP and L LSP for ease with ADL's (progressing, not met)  2. PT will demonstrate improved upright posture with minimal cuing for ease with functional positioning in home and community. (progressing, not met)         Long Term Goals (8 Weeks):   1. Pt will report being independent with HEP for maintenance of improvements gained during therapy sessions (progressing, not met)   2. PT will report 50% reduction of pain of the neck and low back for ease with household chores.  (progressing, not met)   3. Pt will demonstrate trunk and extremity strength to >=4+/5 without the provocation of pain for ease with yoga (progressing, not met)   4. Pt will demonstrate appropriate upright posture without external cueing for ease with schoolwork.  (progressing, not met)   5. Pt to demonstrate improved functional ability with FOTO limitation <=20-40% disability. (progressing, not met)     Plan     Continue with established PT Plan of Care and focus on cervical / scapular stabilization, core strengthening, and manual therapy.     Tiffani Love, PT

## 2019-12-18 ENCOUNTER — PATIENT MESSAGE (OUTPATIENT)
Dept: OBSTETRICS AND GYNECOLOGY | Facility: CLINIC | Age: 49
End: 2019-12-18

## 2019-12-18 ENCOUNTER — TELEPHONE (OUTPATIENT)
Dept: OBSTETRICS AND GYNECOLOGY | Facility: CLINIC | Age: 49
End: 2019-12-18

## 2019-12-18 ENCOUNTER — CLINICAL SUPPORT (OUTPATIENT)
Dept: REHABILITATION | Facility: OTHER | Age: 49
End: 2019-12-18
Payer: COMMERCIAL

## 2019-12-18 DIAGNOSIS — G89.29 CHRONIC LEFT-SIDED LOW BACK PAIN WITHOUT SCIATICA: ICD-10-CM

## 2019-12-18 DIAGNOSIS — R82.90 FOUL SMELLING URINE: Primary | ICD-10-CM

## 2019-12-18 DIAGNOSIS — M54.50 CHRONIC LEFT-SIDED LOW BACK PAIN WITHOUT SCIATICA: ICD-10-CM

## 2019-12-18 DIAGNOSIS — M54.2 NECK PAIN ON RIGHT SIDE: ICD-10-CM

## 2019-12-18 PROCEDURE — 97140 MANUAL THERAPY 1/> REGIONS: CPT | Mod: PN | Performed by: PHYSICAL THERAPIST

## 2019-12-18 PROCEDURE — 97110 THERAPEUTIC EXERCISES: CPT | Mod: PN | Performed by: PHYSICAL THERAPIST

## 2019-12-18 NOTE — PROGRESS NOTES
"  Physical Therapy Daily Treatment Note     Name: Deja Theodore  Clinic Number: [id number]    Therapy Diagnosis:   Encounter Diagnoses   Name Primary?    Neck pain on right side     Chronic left-sided low back pain without sciatica      Physician: Bella Ying PA-C    Visit Date: 12/18/2019    Physician Orders: PT Eval and Treat Lumbar protocol with home exercises. ROM, stretching lumbar and abdominal musculature. Aerobic condidtioning, aqua therapy , with modalities including ultrasound, massage, dry needling PRN. 2-3 X week for 6 weeks.    Medical Diagnosis: M43.16 (ICD-10-CM) - Spondylolisthesis of lumbar region M43.00 (ICD-10-CM) - Spondylolysis M54.16 (ICD-10-CM) - Lumbar radiculopathy M54.2 (ICD-10-CM) - Neck pain M50.30 (ICD-10-CM) - DDD (degenerative disc disease), cervical    Evaluation Date: 10/23/2019  Authorization Period Expiration: 12/31/2019  Plan of Care Certification Period: 12/6/2019  Visit #/Visits authorized: 6/20     Time In: 8:05 AM  Time Out: 9:05 AM  Total Billable Time: 60 minutes    Precautions: Standard    Subjective     Pt reports:felt a lot of relief with dry needling, but pain flared after long exam yesterday. She says she's trying to be very aware of her posture while she's studying and testing, but with the stress of her exams her neck pain is increased.    She was compliant with home exercise program.  Response to previous treatment: no new changes  Functional change: no new changes     Pain: 8/10  Location: bilateral neck, lower back    Objective     Nicole received therapeutic exercises to develop strength, endurance, ROM, flexibility, posture and core stabilization for 20 minutes including:  Chin tucks 15 x 5"   Scap retractions 15 x 5"  +Child's pose x 3 min    Not performed today  UT stretch 3 x 20" (increased L neck pain)   1/2 foam pec stretch (ceased due to increased numbness to fingers)  Supine serratus punch 10 x 3"  Supine no moneys (unable to do)  Prone retractions " "15 x 5"  Prone I's (unable to do due to c/o increased numbness from carpal tunnel)    TA bracing 10 x 5"  TA bracing w/ adduction ball squeeze 10 x 5"  Piriformis stretch 3 x 30"     Nicole received the following manual therapy techniques: Myofacial release and Soft tissue Mobilization were applied to the: neck for 30 minutes, includin min x Application of TDN: Pt educated on benefits and potential side effects of dry needling. Educated pt on benefits, precautions, side effects following TDN. Educated pt to use heat following treatment sessions if pt is experiencing pain or soreness. Pt verbalized good understanding of education.  Pt signed written consent to dry needling. Pt gave verbal consent for DN    Pt received dry needling to the below listed muscles using 30 and 40 mm needles.  CSP: B UT, LS, rhomboid, middle trap, suboccipitals (GB20), L PVM C7    Winding performed every 5 minutes during treatment.     00 min x MET to CSP to improve lateral bending and rotation    Nicole received hot pack for 10 minutes to neck / LB.      Home Exercises Provided and Patient Education Provided     Education provided:   - importance of neck / scapular stabilization exercises for decreased neck pain, proper form and technique, rationale behind each ex     Written Home Exercises Provided: Patient instructed to cont prior HEP.  Exercises were reviewed and Nicole was able to demonstrate them prior to the end of the session.  Nicole demonstrated fair  understanding of the education provided.     See EMR under Patient Instructions for exercises provided 10/23/2019.    Assessment     Good tolerance to treatment today. Good soft tissue response to dry needling evident by increased grasp with unilateral winding at all insertion points, particularly L suboccipital and R UT. Winding performed every 5 minutes during treatment. No adverse effects following treatment.       Nicole is progressing well towards her goals.   Pt prognosis is Fair. "     Pt will continue to benefit from skilled outpatient physical therapy to address the deficits listed in the problem list box on initial evaluation, provide pt/family education and to maximize pt's level of independence in the home and community environment.     Pt's spiritual, cultural and educational needs considered and pt agreeable to plan of care and goals.     Anticipated barriers to physical therapy: school schedule and childcare     Goals: IE 10/23/2019  Short Term Goals (4 Weeks):   1. Pt will report 20% reduction in pain of the R CSP and L LSP for ease with ADL's (progressing, not met)  2. PT will demonstrate improved upright posture with minimal cuing for ease with functional positioning in home and community. (progressing, not met)         Long Term Goals (8 Weeks):   1. Pt will report being independent with HEP for maintenance of improvements gained during therapy sessions (progressing, not met)   2. PT will report 50% reduction of pain of the neck and low back for ease with household chores.  (progressing, not met)   3. Pt will demonstrate trunk and extremity strength to >=4+/5 without the provocation of pain for ease with yoga (progressing, not met)   4. Pt will demonstrate appropriate upright posture without external cueing for ease with schoolwork.  (progressing, not met)   5. Pt to demonstrate improved functional ability with FOTO limitation <=20-40% disability. (progressing, not met)     Plan     Continue with established PT Plan of Care and focus on cervical / scapular stabilization, core strengthening, and manual therapy.     Tiffani Love, PT

## 2019-12-19 ENCOUNTER — LAB VISIT (OUTPATIENT)
Dept: LAB | Facility: HOSPITAL | Age: 49
End: 2019-12-19
Attending: OBSTETRICS & GYNECOLOGY
Payer: COMMERCIAL

## 2019-12-19 DIAGNOSIS — R82.90 FOUL SMELLING URINE: ICD-10-CM

## 2019-12-19 LAB
BACTERIA #/AREA URNS AUTO: NORMAL /HPF
BILIRUB UR QL STRIP: NEGATIVE
CLARITY UR REFRACT.AUTO: CLEAR
COLOR UR AUTO: ABNORMAL
GLUCOSE UR QL STRIP: NEGATIVE
HGB UR QL STRIP: NEGATIVE
KETONES UR QL STRIP: NEGATIVE
LEUKOCYTE ESTERASE UR QL STRIP: ABNORMAL
MICROSCOPIC COMMENT: NORMAL
NITRITE UR QL STRIP: NEGATIVE
PH UR STRIP: 7 [PH] (ref 5–8)
PROT UR QL STRIP: NEGATIVE
RBC #/AREA URNS AUTO: 0 /HPF (ref 0–4)
SP GR UR STRIP: 1 (ref 1–1.03)
SQUAMOUS #/AREA URNS AUTO: 0 /HPF
URN SPEC COLLECT METH UR: ABNORMAL
WBC #/AREA URNS AUTO: 1 /HPF (ref 0–5)

## 2019-12-19 PROCEDURE — 81001 URINALYSIS AUTO W/SCOPE: CPT

## 2019-12-19 PROCEDURE — 87086 URINE CULTURE/COLONY COUNT: CPT

## 2019-12-20 ENCOUNTER — PATIENT MESSAGE (OUTPATIENT)
Dept: OBSTETRICS AND GYNECOLOGY | Facility: CLINIC | Age: 49
End: 2019-12-20

## 2019-12-20 LAB
BACTERIA UR CULT: NORMAL
BACTERIA UR CULT: NORMAL

## 2019-12-23 ENCOUNTER — PATIENT MESSAGE (OUTPATIENT)
Dept: OBSTETRICS AND GYNECOLOGY | Facility: CLINIC | Age: 49
End: 2019-12-23

## 2020-01-10 ENCOUNTER — OFFICE VISIT (OUTPATIENT)
Dept: PODIATRY | Facility: CLINIC | Age: 50
End: 2020-01-10
Payer: COMMERCIAL

## 2020-01-10 VITALS — HEART RATE: 80 BPM | DIASTOLIC BLOOD PRESSURE: 81 MMHG | SYSTOLIC BLOOD PRESSURE: 109 MMHG

## 2020-01-10 DIAGNOSIS — L84 CALLUS OF FOOT: ICD-10-CM

## 2020-01-10 DIAGNOSIS — L85.1 PLANTAR POROKERATOSIS, ACQUIRED: Primary | ICD-10-CM

## 2020-01-10 PROCEDURE — 99213 OFFICE O/P EST LOW 20 MIN: CPT | Mod: S$GLB,,, | Performed by: PODIATRIST

## 2020-01-10 PROCEDURE — 99213 PR OFFICE/OUTPT VISIT, EST, LEVL III, 20-29 MIN: ICD-10-PCS | Mod: S$GLB,,, | Performed by: PODIATRIST

## 2020-01-10 PROCEDURE — 99999 PR PBB SHADOW E&M-EST. PATIENT-LVL II: ICD-10-PCS | Mod: PBBFAC,,, | Performed by: PODIATRIST

## 2020-01-10 PROCEDURE — 99999 PR PBB SHADOW E&M-EST. PATIENT-LVL II: CPT | Mod: PBBFAC,,, | Performed by: PODIATRIST

## 2020-01-10 NOTE — PROGRESS NOTES
Chief Complaint   Patient presents with    Callouses          HPI:   Deja Theodore is a 49 y.o. female who presents to clinic with complaints of bilateral foot pain, due to painful calluses sub metatarsal heads bilateral  She is usually in athletic shoes with arch supports.  Today in athletic shoes  She has used urea lotion but not consistently.           Patient Active Problem List   Diagnosis    Sciatica    Spondylosis with myelopathy, lumbar region    Degeneration of lumbar or lumbosacral intervertebral disc    Spondylolysis of lumbar region    Spondylolisthesis    Myopia of both eyes - Both Eyes    Gall bladder polyp    Headache, menstrual migraine, intractable    Bilateral carpal tunnel syndrome    Calculus of gallbladder without cholecystitis without obstruction    Paronychia of finger, right    Endometriosis    Neck pain on right side    Chronic left-sided low back pain without sciatica         Current Outpatient Medications on File Prior to Visit   Medication Sig Dispense Refill    hydrOXYzine (ATARAX) 50 MG tablet TK 1 T PO  HS PRN FOR INSOMNIA  0    ibuprofen (ADVIL,MOTRIN) 600 MG tablet TAKE 1 TABLET(600 MG) BY MOUTH EVERY 6 HOURS AS NEEDED 30 tablet 0    propranolol (INDERAL) 10 MG tablet TK 1/2 TO 1 T PO QAM  0    sertraline (ZOLOFT) 100 MG tablet Take 200 mg by mouth once daily.      urea 10 % Lotn Apply 1 application topically once daily. To dry skin on the feet. 177 mL 10     No current facility-administered medications on file prior to visit.            ALLG:  Review of patient's allergies indicates:   Allergen Reactions    No known allergies            Social History     Socioeconomic History    Marital status: Legally      Spouse name: Not on file    Number of children: 2    Years of education: Not on file    Highest education level: Not on file   Occupational History     Employer: not employed   Social Needs    Financial resource strain: Not hard at all     Food insecurity:     Worry: Never true     Inability: Never true    Transportation needs:     Medical: No     Non-medical: No   Tobacco Use    Smoking status: Former Smoker     Packs/day: 0.30     Years: 10.00     Pack years: 3.00     Types: Cigarettes     Last attempt to quit: 2005     Years since quittin.2    Smokeless tobacco: Never Used   Substance and Sexual Activity    Alcohol use: Not Currently     Frequency: Never     Binge frequency: Never     Comment: Rare    Drug use: No    Sexual activity: Not Currently     Partners: Male     Birth control/protection: None   Lifestyle    Physical activity:     Days per week: 0 days     Minutes per session: 0 min    Stress: Rather much   Relationships    Social connections:     Talks on phone: More than three times a week     Gets together: Three times a week     Attends Advent service: Not on file     Active member of club or organization: No     Attends meetings of clubs or organizations: Never     Relationship status:    Other Topics Concern    Not on file   Social History Narrative    Not on file         ROS:  General ROS: negative for - chills, fatigue or fever  Cardiovascular ROS: no chest pain or dyspnea on exertion  Musculoskeletal ROS: negative for - joint pain or joint stiffness   Skin: Negative for rash, negative for nail or hair changes. Positive for  Corn/callus on the foot.         EXAM:  Vitals:    01/10/20 0848   BP: 109/81   BP Location: Left arm   Patient Position: Sitting   BP Method: Medium (Automatic)   Pulse: 80        General: alert and orient x 3, well-developed, well-nourished and in no apparent distress.    Vasc: Palpable pedal pulses, feet appropriately warm to touch. Capillary refill time within normal limits.   Neuro: Epicritic sensation intact.  No focal deficits. No Tinels.   MSK: bilateral mild bunion deformity, minimal pes planus, arch still maintained weight bearing. Minimal equinus bilateral   Derm:   Multiple Nucleated porokeratosis, bilateral sub 2nd met head.  Tender to palpation.  No other open lesions, macerations, or rashes noted.   No redness or swelling           Assessment / Plan:    Problem List Items Addressed This Visit     None      Visit Diagnoses     Plantar porokeratosis, acquired - Left Foot    -  Primary    Callus of foot              I counseled the patient on her conditions, their implications and medical management.     Plantar porokeratosis, acquired - Left Foot    Callus of foot    - continue custom foot orthotics or consider Spenco orthotic Arch (OTC)  - calluses trimmed as a courtesy  - never walk barefoot  - moisturize feet daily    Call or return to clinic prn if these symptoms worsen or fail to improve as anticipated.

## 2020-01-20 ENCOUNTER — PATIENT MESSAGE (OUTPATIENT)
Dept: INTERNAL MEDICINE | Facility: CLINIC | Age: 50
End: 2020-01-20

## 2020-01-20 ENCOUNTER — OFFICE VISIT (OUTPATIENT)
Dept: INTERNAL MEDICINE | Facility: CLINIC | Age: 50
End: 2020-01-20
Payer: COMMERCIAL

## 2020-01-20 ENCOUNTER — HOSPITAL ENCOUNTER (OUTPATIENT)
Dept: RADIOLOGY | Facility: HOSPITAL | Age: 50
Discharge: HOME | End: 2020-01-20
Attending: INTERNAL MEDICINE
Payer: COMMERCIAL

## 2020-01-20 VITALS
SYSTOLIC BLOOD PRESSURE: 110 MMHG | DIASTOLIC BLOOD PRESSURE: 60 MMHG | OXYGEN SATURATION: 99 % | BODY MASS INDEX: 22.31 KG/M2 | WEIGHT: 121.25 LBS | HEART RATE: 83 BPM | HEIGHT: 62 IN

## 2020-01-20 DIAGNOSIS — M25.532 LEFT WRIST PAIN: Primary | ICD-10-CM

## 2020-01-20 DIAGNOSIS — M25.532 LEFT WRIST PAIN: ICD-10-CM

## 2020-01-20 PROCEDURE — 3008F PR BODY MASS INDEX (BMI) DOCUMENTED: ICD-10-PCS | Mod: CPTII,S$GLB,, | Performed by: INTERNAL MEDICINE

## 2020-01-20 PROCEDURE — 73110 X-RAY EXAM OF WRIST: CPT | Mod: TC,LT

## 2020-01-20 PROCEDURE — 99999 PR PBB SHADOW E&M-EST. PATIENT-LVL III: CPT | Mod: PBBFAC,,, | Performed by: INTERNAL MEDICINE

## 2020-01-20 PROCEDURE — 99999 PR PBB SHADOW E&M-EST. PATIENT-LVL III: ICD-10-PCS | Mod: PBBFAC,,, | Performed by: INTERNAL MEDICINE

## 2020-01-20 PROCEDURE — 99214 PR OFFICE/OUTPT VISIT, EST, LEVL IV, 30-39 MIN: ICD-10-PCS | Mod: S$GLB,,, | Performed by: INTERNAL MEDICINE

## 2020-01-20 PROCEDURE — 73110 X-RAY EXAM OF WRIST: CPT | Mod: 26,LT,, | Performed by: RADIOLOGY

## 2020-01-20 PROCEDURE — 3008F BODY MASS INDEX DOCD: CPT | Mod: CPTII,S$GLB,, | Performed by: INTERNAL MEDICINE

## 2020-01-20 PROCEDURE — 73110 XR WRIST COMPLETE 3 VIEWS LEFT: ICD-10-PCS | Mod: 26,LT,, | Performed by: RADIOLOGY

## 2020-01-20 PROCEDURE — 99214 OFFICE O/P EST MOD 30 MIN: CPT | Mod: S$GLB,,, | Performed by: INTERNAL MEDICINE

## 2020-01-20 NOTE — PROGRESS NOTES
Subjective:       Patient ID: Deja Theodore is a 49 y.o. female.    Chief Complaint: Wrist Pain (left)    HPI - urgent care visit.  Nicole has had left wrist discomfort at the base of the fifth digit for about two weeks.  She remembers a FOOSH, and wanted to get it checked out.  No swelling or disability, just a moderate pain.  She's got known LBP from DJD/old injury.  Not a smoker.    Pmh/meds: Reviewed and reconciled in EPIC with patient during visit today.    Review of Systems   Constitutional: Negative for fever.   HENT: Negative for congestion.    Respiratory: Negative for shortness of breath.    Cardiovascular: Negative for chest pain.   Gastrointestinal: Negative for abdominal pain.   Genitourinary: Negative for difficulty urinating.   Musculoskeletal: Positive for arthralgias.   Skin: Negative for rash.   Neurological: Negative for headaches.   Psychiatric/Behavioral: Negative for sleep disturbance.       Objective:      Physical Exam   Constitutional: She appears well-developed and well-nourished. No distress.   HENT:   Head: Normocephalic and atraumatic.   Musculoskeletal:   FROM bilateral wrists.  No edema or erythema.  No point tenderness.  Positive Tinel's (known CTS).     Neurological: She is alert.   Skin: She is not diaphoretic.   Psychiatric: She has a normal mood and affect.   Nursing note and vitals reviewed.      Assessment:       1. Left wrist pain        Plan:       Deja was seen today for wrist pain.    Diagnoses and all orders for this visit:    Left wrist pain - new problem.  Doubt fracture, but will send for plain films.  Use Motrin prn pain and can use CTS braces for pain relief.  Will call with results  -     X-Ray Wrist 2 View Left; Future    rtc prn    G Malou Ortiz MD MPH  Staff Internist

## 2020-02-07 ENCOUNTER — OFFICE VISIT (OUTPATIENT)
Dept: PODIATRY | Facility: CLINIC | Age: 50
End: 2020-02-07
Payer: COMMERCIAL

## 2020-02-07 VITALS
WEIGHT: 121 LBS | HEART RATE: 90 BPM | HEIGHT: 62 IN | SYSTOLIC BLOOD PRESSURE: 111 MMHG | DIASTOLIC BLOOD PRESSURE: 62 MMHG | BODY MASS INDEX: 22.26 KG/M2

## 2020-02-07 DIAGNOSIS — L85.1 PLANTAR POROKERATOSIS, ACQUIRED: Primary | ICD-10-CM

## 2020-02-07 DIAGNOSIS — M79.671 BILATERAL FOOT PAIN: ICD-10-CM

## 2020-02-07 DIAGNOSIS — M79.672 BILATERAL FOOT PAIN: ICD-10-CM

## 2020-02-07 PROCEDURE — 99999 PR PBB SHADOW E&M-EST. PATIENT-LVL III: ICD-10-PCS | Mod: PBBFAC,,, | Performed by: PODIATRIST

## 2020-02-07 PROCEDURE — 99213 PR OFFICE/OUTPT VISIT, EST, LEVL III, 20-29 MIN: ICD-10-PCS | Mod: S$GLB,,, | Performed by: PODIATRIST

## 2020-02-07 PROCEDURE — 3008F PR BODY MASS INDEX (BMI) DOCUMENTED: ICD-10-PCS | Mod: CPTII,S$GLB,, | Performed by: PODIATRIST

## 2020-02-07 PROCEDURE — 99999 PR PBB SHADOW E&M-EST. PATIENT-LVL III: CPT | Mod: PBBFAC,,, | Performed by: PODIATRIST

## 2020-02-07 PROCEDURE — 3008F BODY MASS INDEX DOCD: CPT | Mod: CPTII,S$GLB,, | Performed by: PODIATRIST

## 2020-02-07 PROCEDURE — 99213 OFFICE O/P EST LOW 20 MIN: CPT | Mod: S$GLB,,, | Performed by: PODIATRIST

## 2020-02-07 NOTE — PROGRESS NOTES
Chief Complaint   Patient presents with    Callouses     bilateral corn scraping          HPI:   Deja Thoedore is a 49 y.o. female who presents to clinic with complaints of bilateral foot pain, due to painful calluses sub metatarsal heads bilateral.  She is usually in athletic shoes with arch supports.  Today in boots due to weather.   She has used urea lotion but not consistently.   She has been on her feet more lately.           Patient Active Problem List   Diagnosis    Sciatica    Spondylosis with myelopathy, lumbar region    Degeneration of lumbar or lumbosacral intervertebral disc    Spondylolysis of lumbar region    Spondylolisthesis    Myopia of both eyes - Both Eyes    Gall bladder polyp    Headache, menstrual migraine, intractable    Bilateral carpal tunnel syndrome    Calculus of gallbladder without cholecystitis without obstruction    Paronychia of finger, right    Endometriosis    Neck pain on right side    Chronic left-sided low back pain without sciatica         Current Outpatient Medications on File Prior to Visit   Medication Sig Dispense Refill    hydrOXYzine (ATARAX) 50 MG tablet TK 1 T PO  HS PRN FOR INSOMNIA  0    ibuprofen (ADVIL,MOTRIN) 600 MG tablet TAKE 1 TABLET(600 MG) BY MOUTH EVERY 6 HOURS AS NEEDED 30 tablet 0    sertraline (ZOLOFT) 100 MG tablet Take 200 mg by mouth once daily.       No current facility-administered medications on file prior to visit.            ALLG:  Review of patient's allergies indicates:   Allergen Reactions    No known allergies            Social History     Socioeconomic History    Marital status:      Spouse name: Not on file    Number of children: 2    Years of education: Not on file    Highest education level: Not on file   Occupational History     Employer: not employed   Social Needs    Financial resource strain: Not hard at all    Food insecurity:     Worry: Never true     Inability: Never true    Transportation needs:  "    Medical: No     Non-medical: No   Tobacco Use    Smoking status: Former Smoker     Packs/day: 0.30     Years: 10.00     Pack years: 3.00     Types: Cigarettes     Last attempt to quit: 2005     Years since quittin.3    Smokeless tobacco: Never Used   Substance and Sexual Activity    Alcohol use: Not Currently     Frequency: Never     Binge frequency: Never     Comment: Rare    Drug use: No    Sexual activity: Not Currently     Partners: Male     Birth control/protection: None   Lifestyle    Physical activity:     Days per week: 0 days     Minutes per session: 0 min    Stress: Rather much   Relationships    Social connections:     Talks on phone: More than three times a week     Gets together: Three times a week     Attends Christianity service: Not on file     Active member of club or organization: No     Attends meetings of clubs or organizations: Never     Relationship status:    Other Topics Concern    Not on file   Social History Narrative    Not on file         ROS:  General ROS: negative for - chills, fatigue or fever  Cardiovascular ROS: no chest pain or dyspnea on exertion  Musculoskeletal ROS: negative for - joint pain or joint stiffness   Skin: Negative for rash, negative for nail or hair changes. Positive for  Corn/callus on the foot.         EXAM:  Vitals:    20 1117   BP: 111/62   BP Location: Left arm   Patient Position: Sitting   BP Method: Medium (Automatic)   Pulse: 90   Weight: 54.9 kg (121 lb)   Height: 5' 2" (1.575 m)        General: alert and orient x 3, well-developed, well-nourished and in no apparent distress.    Vasc: Palpable pedal pulses, feet appropriately warm to touch. Capillary refill time within normal limits.   Neuro: Epicritic sensation intact.  No focal deficits. No Tinels.   MSK: bilateral mild bunion deformity, minimal pes planus, arch still maintained weight bearing. Minimal equinus bilateral   Derm:  Multiple Nucleated porokeratosis, " bilateral sub 2nd met head.  Tender to palpation.  No other open lesions, macerations, or rashes noted.   No redness or swelling           Assessment / Plan:    Problem List Items Addressed This Visit     None      Visit Diagnoses     Plantar porokeratosis, acquired - Left Foot    -  Primary    Bilateral foot pain              I counseled the patient on her conditions, their implications and medical management.     Plantar porokeratosis, acquired - Left Foot    Bilateral foot pain    - continue custom foot orthotics  - calluses trimmed as a courtesy  - never walk barefoot  - moisturize feet daily    Call or return to clinic prn if these symptoms worsen or fail to improve as anticipated.

## 2020-04-08 ENCOUNTER — DOCUMENTATION ONLY (OUTPATIENT)
Dept: REHABILITATION | Facility: OTHER | Age: 50
End: 2020-04-08

## 2020-04-08 DIAGNOSIS — M54.50 CHRONIC LEFT-SIDED LOW BACK PAIN WITHOUT SCIATICA: ICD-10-CM

## 2020-04-08 DIAGNOSIS — G89.29 CHRONIC LEFT-SIDED LOW BACK PAIN WITHOUT SCIATICA: ICD-10-CM

## 2020-04-08 DIAGNOSIS — M54.2 NECK PAIN ON RIGHT SIDE: ICD-10-CM

## 2020-04-08 NOTE — PROGRESS NOTES
Outpatient Therapy Discharge Summary     Name: Deja Theodore  [facility] Number: [id number]    Therapy Diagnosis:   Encounter Diagnoses   Name Primary?    Neck pain on right side     Chronic left-sided low back pain without sciatica      Physician: Bella Ying PA-C    Physician Orders: PT Eval and Treat Lumbar protocol with home exercises. ROM, stretching lumbar and abdominal musculature. Aerobic condidtioning, aqua therapy , with modalities including ultrasound, massage, dry needling PRN. 2-3 X week for 6 weeks.    Medical Diagnosis: M43.16 (ICD-10-CM) - Spondylolisthesis of lumbar region M43.00 (ICD-10-CM) - Spondylolysis M54.16 (ICD-10-CM) - Lumbar radiculopathy M54.2 (ICD-10-CM) - Neck pain M50.30 (ICD-10-CM) - DDD (degenerative disc disease), cervical    Evaluation Date: 10/23/2019      Date of Last visit: 12/18/2019  Total Visits Received: 6  Cancelled Visits: 8  No Show Visits: 0    Assessment    Goals: Unable to assess, pt did not return for further treatment or assessment     Discharge reason: Patient has not attended therapy since 12/18/2019    Plan   This patient is discharged from Physical Therapy

## 2020-05-13 ENCOUNTER — PATIENT MESSAGE (OUTPATIENT)
Dept: OBSTETRICS AND GYNECOLOGY | Facility: CLINIC | Age: 50
End: 2020-05-13

## 2020-05-15 ENCOUNTER — PATIENT MESSAGE (OUTPATIENT)
Dept: INTERNAL MEDICINE | Facility: CLINIC | Age: 50
End: 2020-05-15

## 2020-05-15 DIAGNOSIS — Z12.11 COLON CANCER SCREENING: ICD-10-CM

## 2020-05-16 ENCOUNTER — PATIENT MESSAGE (OUTPATIENT)
Dept: OPTOMETRY | Facility: CLINIC | Age: 50
End: 2020-05-16

## 2020-05-22 ENCOUNTER — OFFICE VISIT (OUTPATIENT)
Dept: INTERNAL MEDICINE | Facility: CLINIC | Age: 50
End: 2020-05-22
Payer: COMMERCIAL

## 2020-05-22 ENCOUNTER — PATIENT OUTREACH (OUTPATIENT)
Dept: ADMINISTRATIVE | Facility: OTHER | Age: 50
End: 2020-05-22

## 2020-05-22 VITALS
HEART RATE: 74 BPM | DIASTOLIC BLOOD PRESSURE: 60 MMHG | OXYGEN SATURATION: 98 % | WEIGHT: 117.94 LBS | HEIGHT: 62 IN | BODY MASS INDEX: 21.7 KG/M2 | TEMPERATURE: 98 F | SYSTOLIC BLOOD PRESSURE: 100 MMHG

## 2020-05-22 DIAGNOSIS — M25.532 LEFT WRIST PAIN: Primary | ICD-10-CM

## 2020-05-22 PROCEDURE — 99213 OFFICE O/P EST LOW 20 MIN: CPT | Mod: S$GLB,,, | Performed by: INTERNAL MEDICINE

## 2020-05-22 PROCEDURE — 99999 PR PBB SHADOW E&M-EST. PATIENT-LVL IV: CPT | Mod: PBBFAC,,, | Performed by: INTERNAL MEDICINE

## 2020-05-22 PROCEDURE — 99999 PR PBB SHADOW E&M-EST. PATIENT-LVL IV: ICD-10-PCS | Mod: PBBFAC,,, | Performed by: INTERNAL MEDICINE

## 2020-05-22 PROCEDURE — 3008F PR BODY MASS INDEX (BMI) DOCUMENTED: ICD-10-PCS | Mod: CPTII,S$GLB,, | Performed by: INTERNAL MEDICINE

## 2020-05-22 PROCEDURE — 99213 PR OFFICE/OUTPT VISIT, EST, LEVL III, 20-29 MIN: ICD-10-PCS | Mod: S$GLB,,, | Performed by: INTERNAL MEDICINE

## 2020-05-22 PROCEDURE — 3008F BODY MASS INDEX DOCD: CPT | Mod: CPTII,S$GLB,, | Performed by: INTERNAL MEDICINE

## 2020-05-22 NOTE — PROGRESS NOTES
Subjective:       Patient ID: Deja Theodore is a 50 y.o. female.    Chief Complaint: Wrist Pain    HPI - I saw her in January after a FOOSH injury with pain in left wrist.  This has persisted since that time, now with pain on the ulnar dorsum of the wrist, radiating to the fifth digit.  She has pain with certain movements of the wrist.  NSAIDS help a bit.  Wonders can she use ice (yes)    Pmh/meds:  Reviewed and reconciled in EPIC with patient during visit today.    Review of Systems   Constitutional: Negative for activity change and unexpected weight change.   HENT: Negative for hearing loss, rhinorrhea and trouble swallowing.    Eyes: Negative for discharge and visual disturbance.   Respiratory: Negative for chest tightness and wheezing.    Cardiovascular: Negative for chest pain and palpitations.   Gastrointestinal: Negative for blood in stool, constipation, diarrhea and vomiting.   Endocrine: Negative for polydipsia and polyuria.   Genitourinary: Negative for difficulty urinating, dysuria, hematuria and menstrual problem.   Musculoskeletal: Positive for arthralgias and joint swelling. Negative for neck pain.   Skin: Negative for rash.   Neurological: Negative for dizziness and weakness.   Psychiatric/Behavioral: Negative for confusion and dysphoric mood.       Objective:      Physical Exam   Constitutional: She appears well-developed and well-nourished. No distress.   HENT:   Head: Normocephalic and atraumatic.   Musculoskeletal: She exhibits no edema.   Left wrist with FROM, ttp over distal ulna.  No deformity or loss of strength   Neurological: She is alert.   Skin: She is not diaphoretic.   Psychiatric: She has a normal mood and affect.   Vitals reviewed.      Assessment:       1. Left wrist pain        Plan:       Deja was seen today for wrist pain.    Diagnoses and all orders for this visit:    Left wrist pain - unclear etiology.  Will ask ortho for assistance  -     Ambulatory referral/consult to  Orthopedics; Future    rtc prn    G Malou Ortiz MD MPH  Staff Internist

## 2020-05-22 NOTE — PROGRESS NOTES
Answers for HPI/ROS submitted by the patient on 5/18/2020   activity change: No  unexpected weight change: No  neck pain: No  hearing loss: No  rhinorrhea: No  trouble swallowing: No  eye discharge: No  visual disturbance: No  chest tightness: No  wheezing: No  chest pain: No  palpitations: No  blood in stool: No  constipation: No  vomiting: No  diarrhea: No  polydipsia: No  polyuria: No  difficulty urinating: No  hematuria: No  menstrual problem: No  dysuria: No  joint swelling: Yes  arthralgias: Yes  headaches: Yes  weakness: No  confusion: No  dysphoric mood: No

## 2020-05-23 NOTE — PROGRESS NOTES
Chart reviewed.   Immunizations: Triggered Imm Registry     Orders placed: n/a  Upcoming appts to satisfy LANE topics: n/a

## 2020-05-25 ENCOUNTER — OFFICE VISIT (OUTPATIENT)
Dept: ORTHOPEDICS | Facility: CLINIC | Age: 50
End: 2020-05-25
Payer: COMMERCIAL

## 2020-05-25 ENCOUNTER — PATIENT MESSAGE (OUTPATIENT)
Dept: ORTHOPEDICS | Facility: CLINIC | Age: 50
End: 2020-05-25

## 2020-05-25 VITALS
HEIGHT: 62 IN | SYSTOLIC BLOOD PRESSURE: 127 MMHG | BODY MASS INDEX: 21.53 KG/M2 | DIASTOLIC BLOOD PRESSURE: 78 MMHG | HEART RATE: 88 BPM | WEIGHT: 117 LBS

## 2020-05-25 DIAGNOSIS — M25.332 DRUJ (DISTAL RADIOULNAR JOINT) INSTABILITY, POST-TRAUMATIC, LEFT: Primary | ICD-10-CM

## 2020-05-25 DIAGNOSIS — M25.532 LEFT WRIST PAIN: ICD-10-CM

## 2020-05-25 PROCEDURE — 99213 OFFICE O/P EST LOW 20 MIN: CPT | Mod: S$GLB,,, | Performed by: ORTHOPAEDIC SURGERY

## 2020-05-25 PROCEDURE — 3008F BODY MASS INDEX DOCD: CPT | Mod: CPTII,S$GLB,, | Performed by: ORTHOPAEDIC SURGERY

## 2020-05-25 PROCEDURE — 99213 PR OFFICE/OUTPT VISIT, EST, LEVL III, 20-29 MIN: ICD-10-PCS | Mod: S$GLB,,, | Performed by: ORTHOPAEDIC SURGERY

## 2020-05-25 PROCEDURE — 3008F PR BODY MASS INDEX (BMI) DOCUMENTED: ICD-10-PCS | Mod: CPTII,S$GLB,, | Performed by: ORTHOPAEDIC SURGERY

## 2020-05-25 PROCEDURE — 99999 PR PBB SHADOW E&M-EST. PATIENT-LVL III: ICD-10-PCS | Mod: PBBFAC,,, | Performed by: ORTHOPAEDIC SURGERY

## 2020-05-25 PROCEDURE — 99999 PR PBB SHADOW E&M-EST. PATIENT-LVL III: CPT | Mod: PBBFAC,,, | Performed by: ORTHOPAEDIC SURGERY

## 2020-05-25 NOTE — LETTER
May 25, 2020      Richard Ortiz II, MD  [address]  [facility] [address]           [place] [address], [address]  [facility] [address]  Phone: [phone]          Patient: Deja Theodore   MR Number: [id number]   YOB: 1970   Date of Visit: 5/25/2020       Dear Dr. Richard Ortiz II:    Thank you for referring Deja Theodore to me for evaluation. Attached you will find relevant portions of my assessment and plan of care.    If you have questions, please do not hesitate to call me. I look forward to following Deja Theodore along with you.    Sincerely,    Tay Hardin MD    Enclosure  CC:  No Recipients    If you would like to receive this communication electronically, please contact [email or web address] or [phone] to request more information on [facility] access.    For providers and/or their staff who would like to refer a patient to [facility], please contact us through our one-stop-shop provider referral line, [facility], at [phone].    If you feel you have received this communication in error or would no longer like to receive these types of communications, please e-mail [email or web address]

## 2020-05-25 NOTE — PROGRESS NOTES
[facility]  History & Physical  Orthopedics    SUBJECTIVE:      Chief Complaint:  Left wrist pain    Referring Provider: Richard Ortiz II, MD     History of Present Illness:  Patient is a 50 y.o. right hand dominant female who presents today with complaints of left wrist pain for the last 5 months.  Patient had a mechanical fall from standing onto an outstretched left hand 5 months ago and was able to bear weight and did not have any noticeable pain in her left wrist after the fall.  One week after the fall she ended up having ulnar-sided wrist pain that has progressed. Hence, she presented to her primary care and ruled out an acute fracture based off of x-ray and recommended bracing and RICE.  Earlier this month she re-presented to her primary care and was referred to our clinic for initial evaluation.  She has tried using her carpal tunnel wrist brace as well as NSAIDs without relief.  She denies any numbness/tingling/weakness.  She also denies any open wounds/swelling/erythema.  Prior to her fall she denies any traumatic history to her left upper extremity.  Of note the patient was diagnosed with bilateral carpal tunnel syndrome (R>L) several years ago that was managed by injections by an orthopedic surgeon in [facility] system (she does not remember the name of the physician) and she saw Dr. Pang in 2018 for I&D of a right small finger felon.  She still has decreased sensation in the distal aspect all digits in her right hand and is satisfied with controlling her symptoms with nighttime splinting.    The patient is a/an law student.    Onset of symptoms/DOI was 5 months ago.    Symptoms are aggravated by activity, movement, driving and during the day.    Symptoms are alleviated by rest, immobilization and medication.    Symptoms consist of pain and decreased ROM.    The patient rates their pain as a 7/10.    Attempted treatment(s) and/or interventions include rest, activity modification,  anti-inflammatory medications and immobilization.     The patient denies any fevers, chills, N/V, D/C and presents for evaluation.       Past Medical History:   Diagnosis Date    Anemia     Arthritis     Carpal tunnel syndrome     Depression     Keratitis secondary to contact lens     OD    Other disorders of eyelid(374.89)     MGD    Spondylolisthesis      Past Surgical History:   Procedure Laterality Date    ADENOIDECTOMY      DIAGNOSTIC LAPAROSCOPY N/A 3/18/2019    Procedure: LAPAROSCOPY, DIAGNOSTIC;  Surgeon: Peggy Carrero MD;  Location: [facility] OR;  Service: OB/GYN;  Laterality: N/A;    HYSTERECTOMY      NOSE SURGERY      Age 18-cosmetic    PARTIAL HYSTERECTOMY  4/15/2019    Procedure: HYSTERECTOMY, SUPRACERVICAL;  Surgeon: Peggy Carrero MD;  Location: [facility];  Service: OB/GYN;;  with bilateral salpingo-oopherectomy    TONSILLECTOMY       Review of patient's allergies indicates:   Allergen Reactions    No known allergies      Social History     Social History Narrative    Not on file     Family History   Problem Relation Age of Onset    Depression Mother     Mental illness Mother     Hypertension Mother     Hypertension Father     Cancer Sister     Asthma Brother     Mental illness Brother     Asthma Son     Amblyopia Neg Hx     Blindness Neg Hx     Cataracts Neg Hx     Diabetes Neg Hx     Glaucoma Neg Hx     Macular degeneration Neg Hx     Retinal detachment Neg Hx     Strabismus Neg Hx     Stroke Neg Hx     Thyroid disease Neg Hx     Breast cancer Neg Hx          Current Outpatient Medications:     hydrOXYzine (ATARAX) 50 MG tablet, TK 1 T PO  HS PRN FOR INSOMNIA, Disp: , Rfl: 0    ibuprofen (ADVIL,MOTRIN) 600 MG tablet, TAKE 1 TABLET(600 MG) BY MOUTH EVERY 6 HOURS AS NEEDED, Disp: 30 tablet, Rfl: 0    sertraline (ZOLOFT) 100 MG tablet, Take 200 mg by mouth once daily., Disp: , Rfl:       Review of Systems:  Constitutional: no fever or chills  Eyes: no visual  "changes  ENT: no nasal congestion or sore throat  Respiratory: no cough or shortness of breath  Cardiovascular: no chest pain  Gastrointestinal: no nausea or vomiting, tolerating diet  Musculoskeletal: arthralgias, myalgias, pain and soreness    OBJECTIVE:      Vital Signs (Most Recent):  Vitals:    05/25/20 1336   BP: 127/78   Pulse: 88   Weight: 53.1 kg (117 lb)   Height: 5' 2" (1.575 m)     Body mass index is 21.4 kg/m².      Physical Exam:  Constitutional: The patient appears well-developed and well-nourished. No distress.   Head: Normocephalic and atraumatic.   Nose: Nose normal.   Eyes: Conjunctivae and EOM are normal.   Neck: No tracheal deviation present.   Cardiovascular: Normal rate and intact distal pulses.    Pulmonary/Chest: Effort normal. No respiratory distress.   Abdominal: There is no guarding.   Neurological: The patient is alert.   Psychiatric: The patient has a normal mood and affect.     Left Hand/Wrist Examination:    Observation/Inspection:  Swelling  none    Deformity  none  Discoloration  none     Scars   none    Atrophy  none    HAND/WRIST EXAMINATION:  Finkelstein's Test   Neg  WHAT Test    Neg  Snuff box tenderness   Neg  South's Test    Neg  Hook of Hamate Tenderness  Neg  CMC grind    Neg  Circumduction test   Neg    Neurovascular Exam:  Digits WWP, brisk CR < 3s throughout  NVI motor/LTS to M/R/U nerves, radial pulse 2+  Tinel's Test - Carpal Tunnel  Neg  Tinel's Test - Cubital Tunnel  Neg  Phalen's Test    Neg  Median Nerve Compression Test Neg    ROM hand/wrist/elbow full, painless    Patient is able to make a full composite fist without extensor lag.  Significant tenderness to palpation over the FCU tendon as well as the ulnar styloid.  Mild tenderness to palpation over the DRUJ.  She has significant pain over the ulnar aspect of the wrist during wrist extension and adduction, but wrist ROM intact with 5/5 strength. Extensors and flexors to the small finger are intact with 5/5 " strength. Negative Shuck test, but moderate pain on DRUJ compression.  Sensation intact to light touch throughout.    RRR  CTAB  Abd S/NT/ND +BS    Diagnostic Results:     Xray -  left wrist films show an ulnar styloid fracture and mild widening of the DRUJ  EMG - 2015 procedure with mild median nerve compression at the carpal tunnel bilaterally (R>L).    ASSESSMENT/PLAN:      Deja Theodore is a 50 y.o. female with possible TFCC tear and or DRUJ injury.  Plan:  Counseled the patient that since she had a traumatic injury to her left wrist that has not improved over the past 5 months and has a small ulnar styloid fracture on imaging, that she likely has a soft tissue injury that would be best seen on more invasive imaging.  Hence, in order to determine how to proceed regarding recommendations of conservative versus nonconservative treatments at this time I recommended an MR Arthrogram of her left wrist.  She agreed with this plan and an MR arthrogram of her left wrist was ordered during this visit.  She will follow up with us upon completion of that study.         Please be aware that this note has been generated with the assistance of [employer] voice-to-text.  Please excuse any spelling or grammatical errors.

## 2020-05-27 ENCOUNTER — PATIENT MESSAGE (OUTPATIENT)
Dept: ORTHOPEDICS | Facility: CLINIC | Age: 50
End: 2020-05-27

## 2020-07-20 ENCOUNTER — LAB VISIT (OUTPATIENT)
Dept: LAB | Facility: HOSPITAL | Age: 50
End: 2020-07-20
Attending: FAMILY MEDICINE
Payer: COMMERCIAL

## 2020-07-20 DIAGNOSIS — Z12.11 COLON CANCER SCREENING: ICD-10-CM

## 2020-07-20 PROCEDURE — 82274 ASSAY TEST FOR BLOOD FECAL: CPT

## 2020-07-23 LAB — HEMOCCULT STL QL IA: NEGATIVE

## 2020-08-03 ENCOUNTER — LAB VISIT (OUTPATIENT)
Dept: INTERNAL MEDICINE | Facility: CLINIC | Age: 50
End: 2020-08-03
Attending: FAMILY MEDICINE
Payer: COMMERCIAL

## 2020-08-03 DIAGNOSIS — B34.9 VIRAL SYNDROME: ICD-10-CM

## 2020-08-03 PROCEDURE — U0003 INFECTIOUS AGENT DETECTION BY NUCLEIC ACID (DNA OR RNA); SEVERE ACUTE RESPIRATORY SYNDROME CORONAVIRUS 2 (SARS-COV-2) (CORONAVIRUS DISEASE [COVID-19]), AMPLIFIED PROBE TECHNIQUE, MAKING USE OF HIGH THROUGHPUT TECHNOLOGIES AS DESCRIBED BY CMS-2020-01-R: HCPCS

## 2020-08-04 ENCOUNTER — LAB VISIT (OUTPATIENT)
Dept: LAB | Facility: HOSPITAL | Age: 50
End: 2020-08-04
Attending: FAMILY MEDICINE
Payer: COMMERCIAL

## 2020-08-04 LAB
SARS-COV-2 IGG SERPLBLD QL IA.RAPID: NEGATIVE
SARS-COV-2 RNA RESP QL NAA+PROBE: NOT DETECTED

## 2020-08-04 PROCEDURE — 36415 COLL VENOUS BLD VENIPUNCTURE: CPT

## 2020-08-04 PROCEDURE — 86769 SARS-COV-2 COVID-19 ANTIBODY: CPT

## 2020-09-01 ENCOUNTER — PATIENT OUTREACH (OUTPATIENT)
Dept: ADMINISTRATIVE | Facility: OTHER | Age: 50
End: 2020-09-01

## 2020-09-01 ENCOUNTER — OFFICE VISIT (OUTPATIENT)
Dept: PODIATRY | Facility: CLINIC | Age: 50
End: 2020-09-01
Payer: COMMERCIAL

## 2020-09-01 VITALS
SYSTOLIC BLOOD PRESSURE: 105 MMHG | WEIGHT: 117 LBS | BODY MASS INDEX: 21.53 KG/M2 | TEMPERATURE: 98 F | HEART RATE: 87 BPM | DIASTOLIC BLOOD PRESSURE: 60 MMHG | HEIGHT: 62 IN

## 2020-09-01 DIAGNOSIS — L85.1 PLANTAR POROKERATOSIS, ACQUIRED: Primary | ICD-10-CM

## 2020-09-01 PROCEDURE — 99212 OFFICE O/P EST SF 10 MIN: CPT | Mod: S$GLB,,, | Performed by: PODIATRIST

## 2020-09-01 PROCEDURE — 99999 PR PBB SHADOW E&M-EST. PATIENT-LVL III: ICD-10-PCS | Mod: PBBFAC,,, | Performed by: PODIATRIST

## 2020-09-01 PROCEDURE — 3008F PR BODY MASS INDEX (BMI) DOCUMENTED: ICD-10-PCS | Mod: CPTII,S$GLB,, | Performed by: PODIATRIST

## 2020-09-01 PROCEDURE — 99999 PR PBB SHADOW E&M-EST. PATIENT-LVL III: CPT | Mod: PBBFAC,,, | Performed by: PODIATRIST

## 2020-09-01 PROCEDURE — 99212 PR OFFICE/OUTPT VISIT, EST, LEVL II, 10-19 MIN: ICD-10-PCS | Mod: S$GLB,,, | Performed by: PODIATRIST

## 2020-09-01 PROCEDURE — 3008F BODY MASS INDEX DOCD: CPT | Mod: CPTII,S$GLB,, | Performed by: PODIATRIST

## 2020-09-01 NOTE — PROGRESS NOTES
Chief Complaint   Patient presents with    Callouses     Bilateral           HPI:   Deja Theodore is a 50 y.o. female who presents to clinic with complaints of bilateral foot pain, due to painful calluses sub metatarsal heads bilateral.  She is usually in athletic shoes with arch supports.    She has used urea lotion but not consistently.   She has been on her feet more lately.           Patient Active Problem List   Diagnosis    Sciatica    Spondylosis with myelopathy, lumbar region    Degeneration of lumbar or lumbosacral intervertebral disc    Spondylolysis of lumbar region    Spondylolisthesis    Myopia of both eyes - Both Eyes    Gall bladder polyp    Headache, menstrual migraine, intractable    Bilateral carpal tunnel syndrome    Calculus of gallbladder without cholecystitis without obstruction    Paronychia of finger, right    Endometriosis         Current Outpatient Medications on File Prior to Visit   Medication Sig Dispense Refill    hydrOXYzine (ATARAX) 50 MG tablet TK 1 T PO  HS PRN FOR INSOMNIA  0    ibuprofen (ADVIL,MOTRIN) 600 MG tablet TAKE 1 TABLET(600 MG) BY MOUTH EVERY 6 HOURS AS NEEDED 30 tablet 0    sertraline (ZOLOFT) 100 MG tablet Take 200 mg by mouth once daily.       No current facility-administered medications on file prior to visit.            ALLG:  Review of patient's allergies indicates:   Allergen Reactions    No known allergies            Social History     Socioeconomic History    Marital status:      Spouse name: Not on file    Number of children: 2    Years of education: Not on file    Highest education level: Not on file   Occupational History     Employer: not employed   Social Needs    Financial resource strain: Not hard at all    Food insecurity     Worry: Never true     Inability: Never true    Transportation needs     Medical: No     Non-medical: No   Tobacco Use    Smoking status: Former Smoker     Packs/day: 0.30     Years: 10.00      "Pack years: 3.00     Types: Cigarettes     Quit date: 2005     Years since quittin.9    Smokeless tobacco: Never Used   Substance and Sexual Activity    Alcohol use: Not Currently     Frequency: Never     Binge frequency: Never     Comment: Rare    Drug use: No    Sexual activity: Not Currently     Partners: Male     Birth control/protection: None   Lifestyle    Physical activity     Days per week: 0 days     Minutes per session: 0 min    Stress: Rather much   Relationships    Social connections     Talks on phone: More than three times a week     Gets together: Three times a week     Attends [employer]ist service: Not on file     Active member of club or organization: No     Attends meetings of clubs or organizations: Never     Relationship status:    Other Topics Concern    Not on file   Social History Narrative    Not on file         ROS:  General ROS: negative for - chills, fatigue or fever  Cardiovascular ROS: no chest pain or dyspnea on exertion  Musculoskeletal ROS: negative for - joint pain or joint stiffness   Skin: Negative for rash, negative for nail or hair changes. Positive for  Corn/callus on the foot.         EXAM:  Vitals:    20 1335   BP: 105/60   Pulse: 87   Temp: 98.1 °F (36.7 °C)   Weight: 53.1 kg (117 lb)   Height: 5' 2" (1.575 m)        General: alert and orient x 3, well-developed, well-nourished and in no apparent distress.    Vasc: Palpable pedal pulses, feet appropriately warm to touch. Capillary refill time within normal limits.   Neuro: Epicritic sensation intact.  No focal deficits. No Tinels.   MSK: bilateral mild bunion deformity, minimal pes planus, arch still maintained weight bearing. Minimal equinus bilateral   Derm:  Multiple Nucleated porokeratosis, bilateral sub 2nd met head.  Tender to palpation.  No other open lesions, macerations, or rashes noted.   No redness or swelling           Assessment / Plan:    Problem List Items Addressed This Visit     " None      Visit Diagnoses     Plantar porokeratosis, acquired    -  Primary          I counseled the patient on her conditions, their implications and medical management.     Plantar porokeratosis, acquired    - continue custom foot orthotics  - never walk barefoot  - moisturize feet daily; consider urea foot soaks.  Prescription provided. ([facility])    Call or return to clinic prn if these symptoms worsen or fail to improve as anticipated.

## 2020-09-08 ENCOUNTER — OFFICE VISIT (OUTPATIENT)
Dept: INTERNAL MEDICINE | Facility: CLINIC | Age: 50
End: 2020-09-08
Attending: FAMILY MEDICINE
Payer: COMMERCIAL

## 2020-09-08 VITALS
WEIGHT: 114 LBS | SYSTOLIC BLOOD PRESSURE: 90 MMHG | HEIGHT: 62 IN | BODY MASS INDEX: 20.98 KG/M2 | DIASTOLIC BLOOD PRESSURE: 58 MMHG

## 2020-09-08 DIAGNOSIS — M54.32 SCIATICA OF LEFT SIDE: Primary | ICD-10-CM

## 2020-09-08 DIAGNOSIS — M47.16 SPONDYLOSIS WITH MYELOPATHY, LUMBAR REGION: ICD-10-CM

## 2020-09-08 DIAGNOSIS — R22.42 MASS OF LEFT KNEE: ICD-10-CM

## 2020-09-08 PROCEDURE — 99214 PR OFFICE/OUTPT VISIT, EST, LEVL IV, 30-39 MIN: ICD-10-PCS | Mod: S$GLB,,, | Performed by: FAMILY MEDICINE

## 2020-09-08 PROCEDURE — 99999 PR PBB SHADOW E&M-EST. PATIENT-LVL IV: ICD-10-PCS | Mod: PBBFAC,,, | Performed by: FAMILY MEDICINE

## 2020-09-08 PROCEDURE — 3008F PR BODY MASS INDEX (BMI) DOCUMENTED: ICD-10-PCS | Mod: CPTII,S$GLB,, | Performed by: FAMILY MEDICINE

## 2020-09-08 PROCEDURE — 3008F BODY MASS INDEX DOCD: CPT | Mod: CPTII,S$GLB,, | Performed by: FAMILY MEDICINE

## 2020-09-08 PROCEDURE — 99999 PR PBB SHADOW E&M-EST. PATIENT-LVL IV: CPT | Mod: PBBFAC,,, | Performed by: FAMILY MEDICINE

## 2020-09-08 PROCEDURE — 99214 OFFICE O/P EST MOD 30 MIN: CPT | Mod: S$GLB,,, | Performed by: FAMILY MEDICINE

## 2020-09-08 RX ORDER — ASPIRIN 325 MG
325 TABLET ORAL DAILY
COMMUNITY
End: 2021-03-05

## 2020-09-08 RX ORDER — ACETAMINOPHEN AND CODEINE PHOSPHATE 300; 30 MG/1; MG/1
TABLET ORAL
COMMUNITY
End: 2022-04-22

## 2020-09-08 RX ORDER — NAPROXEN 250 MG/1
250 TABLET ORAL
COMMUNITY
End: 2021-03-05

## 2020-09-08 NOTE — PROGRESS NOTES
Subjective:       Patient ID: Deja Theodore is a 50 y.o. female.    Chief Complaint: Cyst (reports of a possible Baker's cyst behind L knee. noticeable on last Thurs.)    HPI  Review of Systems   Constitutional: Negative for activity change, chills, fatigue, fever and unexpected weight change.   HENT: Negative for congestion, hearing loss, rhinorrhea and trouble swallowing.    Eyes: Negative for discharge, redness and visual disturbance.   Respiratory: Negative for cough, chest tightness, shortness of breath and wheezing.    Cardiovascular: Negative for chest pain, palpitations and leg swelling.   Gastrointestinal: Negative for abdominal pain, blood in stool, constipation, diarrhea and vomiting.   Endocrine: Negative for polydipsia and polyuria.   Genitourinary: Negative for difficulty urinating, dysuria, hematuria and menstrual problem.   Musculoskeletal: Positive for arthralgias, back pain, joint swelling and myalgias. Negative for gait problem and neck pain.   Skin: Negative for color change and rash.   Neurological: Positive for headaches. Negative for tremors, speech difficulty, weakness and numbness.   Hematological: Negative for adenopathy. Does not bruise/bleed easily.   Psychiatric/Behavioral: Negative for behavioral problems, confusion, dysphoric mood and sleep disturbance. The patient is not nervous/anxious.        Objective:      Physical Exam  Vitals signs and nursing note reviewed.   Constitutional:       General: She is not in acute distress.     Appearance: She is well-developed.   Neck:      Musculoskeletal: Neck supple.   Pulmonary:      Effort: Pulmonary effort is normal.   Abdominal:      Tenderness: There is no abdominal tenderness. There is no rebound.   Musculoskeletal:      Right hip: She exhibits normal range of motion and normal strength.      Left hip: She exhibits normal range of motion and normal strength.      Right knee: She exhibits normal range of motion, no swelling, no  effusion, no ecchymosis, no deformity, no laceration, no erythema, normal alignment, no LCL laxity, normal patellar mobility, no bony tenderness, normal meniscus and no MCL laxity. No tenderness found. No medial joint line, no lateral joint line, no MCL, no LCL and no patellar tendon tenderness noted.      Left knee: She exhibits normal range of motion, no swelling, no effusion, no ecchymosis, no deformity, no laceration, no erythema, normal alignment, no LCL laxity, normal patellar mobility, no bony tenderness, normal meniscus and no MCL laxity. No tenderness found. No medial joint line, no lateral joint line, no MCL, no LCL and no patellar tendon tenderness noted.      Thoracic back: She exhibits normal range of motion and no tenderness.      Lumbar back: She exhibits normal range of motion, no tenderness and no spasm.      Right lower leg: No edema.      Left lower leg: No edema.        Legs:    Skin:     General: Skin is warm and dry.      Findings: No rash.   Neurological:      Mental Status: She is alert and oriented to person, place, and time.      GCS: GCS eye subscore is 4. GCS verbal subscore is 5. GCS motor subscore is 6.      Sensory: No sensory deficit.      Motor: No abnormal muscle tone.      Coordination: Coordination normal.      Gait: Gait normal.      Deep Tendon Reflexes:      Reflex Scores:       Patellar reflexes are 2+ on the right side and 2+ on the left side.       Achilles reflexes are 2+ on the right side and 2+ on the left side.     Comments: Negative SLR.  Negative ADELE.  Negative FADIR.   Psychiatric:         Behavior: Behavior normal.         Thought Content: Thought content normal.         Judgment: Judgment normal.         Assessment:       1. Sciatica of left side    2. Spondylosis with myelopathy, lumbar region    3. Mass of left knee        Plan:     Medication List with Changes/Refills   Current Medications    ACETAMINOPHEN-CODEINE 300-30MG (TYLENOL #3) 300-30 MG TAB    Take by  mouth.    ASPIRIN 325 MG TABLET    Take 325 mg by mouth once daily.    HYDROXYZINE (ATARAX) 50 MG TABLET    TK 1 T PO  HS PRN FOR INSOMNIA    IBUPROFEN (ADVIL,MOTRIN) 600 MG TABLET    TAKE 1 TABLET(600 MG) BY MOUTH EVERY 6 HOURS AS NEEDED    NAPROXEN (NAPROSYN) 250 MG TABLET    Take 250 mg by mouth as needed.    SERTRALINE (ZOLOFT) 100 MG TABLET    Take 200 mg by mouth once daily.     Deja was seen today for cyst.    Diagnoses and all orders for this visit:    Sciatica of left side  -     Ambulatory referral/consult to [facility]; Future    Spondylosis with myelopathy, lumbar region  -     Ambulatory referral/consult to [facility]; Future    Mass of left knee  -     US Lower Extremity Veins Bilateral; Future  -     MRI Knee Without Contrast Left; Future      See meds, orders, follow up, routing and instructions sections of encounter and AVS. Discussed with patient and provided on AVS.      Notice swollen area behind the left knee about a week ago.  Also has recurrent sciatica of or pain from hip distally.  She equates the to however they are likely separate entities.  No prior left knee injury or left knee complaints such as mechanical or other.  The area may represent a Baker cyst.  I explained that this is usually the consequence of arthritis if that is the case.  Other considerations include ganglion and much less likely vascular.  I do not think sciatica and the knee are connected at this time.  I recommended an MRI to better classify the soft tissue mass behind the left knee.

## 2020-09-28 ENCOUNTER — TELEPHONE (OUTPATIENT)
Dept: OBSTETRICS AND GYNECOLOGY | Facility: CLINIC | Age: 50
End: 2020-09-28

## 2020-09-28 DIAGNOSIS — Z12.31 BREAST CANCER SCREENING BY MAMMOGRAM: Primary | ICD-10-CM

## 2020-10-12 ENCOUNTER — OFFICE VISIT (OUTPATIENT)
Dept: OPTOMETRY | Facility: CLINIC | Age: 50
End: 2020-10-12
Payer: COMMERCIAL

## 2020-10-12 DIAGNOSIS — H04.123 BILATERAL DRY EYES: Primary | ICD-10-CM

## 2020-10-12 DIAGNOSIS — H52.13 MYOPIA WITH ASTIGMATISM AND PRESBYOPIA, BILATERAL: Primary | ICD-10-CM

## 2020-10-12 DIAGNOSIS — H52.203 MYOPIA WITH ASTIGMATISM AND PRESBYOPIA, BILATERAL: Primary | ICD-10-CM

## 2020-10-12 DIAGNOSIS — H52.13 MYOPIA WITH ASTIGMATISM AND PRESBYOPIA, BILATERAL: ICD-10-CM

## 2020-10-12 DIAGNOSIS — H52.203 MYOPIA WITH ASTIGMATISM AND PRESBYOPIA, BILATERAL: ICD-10-CM

## 2020-10-12 DIAGNOSIS — H52.4 MYOPIA WITH ASTIGMATISM AND PRESBYOPIA, BILATERAL: Primary | ICD-10-CM

## 2020-10-12 DIAGNOSIS — H52.4 MYOPIA WITH ASTIGMATISM AND PRESBYOPIA, BILATERAL: ICD-10-CM

## 2020-10-12 PROCEDURE — 92014 PR EYE EXAM, EST PATIENT,COMPREHESV: ICD-10-PCS | Mod: S$GLB,,, | Performed by: OPTOMETRIST

## 2020-10-12 PROCEDURE — 99499 UNLISTED E&M SERVICE: CPT | Mod: S$GLB,,, | Performed by: OPTOMETRIST

## 2020-10-12 PROCEDURE — 99499 NO LOS: ICD-10-PCS | Mod: S$GLB,,, | Performed by: OPTOMETRIST

## 2020-10-12 PROCEDURE — 99999 PR PBB SHADOW E&M-EST. PATIENT-LVL III: CPT | Mod: PBBFAC,,, | Performed by: OPTOMETRIST

## 2020-10-12 PROCEDURE — 99999 PR PBB SHADOW E&M-EST. PATIENT-LVL III: ICD-10-PCS | Mod: PBBFAC,,, | Performed by: OPTOMETRIST

## 2020-10-12 PROCEDURE — 92014 COMPRE OPH EXAM EST PT 1/>: CPT | Mod: S$GLB,,, | Performed by: OPTOMETRIST

## 2020-10-12 RX ORDER — TRAZODONE HYDROCHLORIDE 50 MG/1
TABLET ORAL
COMMUNITY
Start: 2020-09-22 | End: 2020-12-11

## 2020-10-12 RX ORDER — MULTIVITAMIN/IRON/FOLIC ACID 18MG-0.4MG
TABLET ORAL
COMMUNITY
Start: 2020-08-15 | End: 2021-08-16

## 2020-10-12 NOTE — PROGRESS NOTES
WADE ALVAREZ 09/2019  Wears contact and patient feels that they are too tight and   she has trouble removing them, vision seems fine.  Wears OTC +1.50 with   contacts and seems to work.  Glasses about 1 yr. Old and still seem fine.   Uses AT's BID OU for dry eyes with relief    Last edited by Hoang Palacio, OD on 10/12/2020  9:17 AM. (History)              Assessment /Plan     For exam results, see Encounter Report.    Bilateral dry eyes    Myopia with astigmatism and presbyopia, bilateral      1. Recommend continue artificial tears. 1 drop 2x per day. Chronicity of disease and treatment discussed.  2.  Contact lens trials ordered for pt. Had trouble getting total dailies one out, Daily wear only advised, with education to risks of extended wear.  Discussed lens care, compliance and solutions.  RTC 2 weeks contact lens follow up if problems.

## 2020-10-12 NOTE — PROGRESS NOTES
Assessment /Plan     For exam results, see Encounter Report.    Myopia with astigmatism and presbyopia, bilateral      1.  Contact lens trials dispensed to pt. Daily wear only advised, with education to risks of extended wear.  Discussed lens care, compliance and solutions.  RTC 2 weeks contact lens follow up.

## 2020-11-02 ENCOUNTER — TELEPHONE (OUTPATIENT)
Dept: OPTOMETRY | Facility: CLINIC | Age: 50
End: 2020-11-02

## 2020-11-02 ENCOUNTER — PATIENT MESSAGE (OUTPATIENT)
Dept: OBSTETRICS AND GYNECOLOGY | Facility: CLINIC | Age: 50
End: 2020-11-02

## 2020-11-02 ENCOUNTER — PATIENT MESSAGE (OUTPATIENT)
Dept: OPTOMETRY | Facility: CLINIC | Age: 50
End: 2020-11-02

## 2020-11-03 ENCOUNTER — OFFICE VISIT (OUTPATIENT)
Dept: PODIATRY | Facility: CLINIC | Age: 50
End: 2020-11-03
Payer: COMMERCIAL

## 2020-11-03 VITALS
HEIGHT: 62 IN | SYSTOLIC BLOOD PRESSURE: 106 MMHG | HEART RATE: 83 BPM | BODY MASS INDEX: 20.98 KG/M2 | WEIGHT: 114 LBS | DIASTOLIC BLOOD PRESSURE: 57 MMHG

## 2020-11-03 DIAGNOSIS — M79.671 BILATERAL FOOT PAIN: ICD-10-CM

## 2020-11-03 DIAGNOSIS — L85.1 PLANTAR POROKERATOSIS, ACQUIRED: Primary | ICD-10-CM

## 2020-11-03 DIAGNOSIS — M79.672 BILATERAL FOOT PAIN: ICD-10-CM

## 2020-11-03 PROCEDURE — 99999 PR PBB SHADOW E&M-EST. PATIENT-LVL III: CPT | Mod: PBBFAC,,, | Performed by: PODIATRIST

## 2020-11-03 PROCEDURE — 99212 PR OFFICE/OUTPT VISIT, EST, LEVL II, 10-19 MIN: ICD-10-PCS | Mod: S$GLB,,, | Performed by: PODIATRIST

## 2020-11-03 PROCEDURE — 3008F PR BODY MASS INDEX (BMI) DOCUMENTED: ICD-10-PCS | Mod: CPTII,S$GLB,, | Performed by: PODIATRIST

## 2020-11-03 PROCEDURE — 99212 OFFICE O/P EST SF 10 MIN: CPT | Mod: S$GLB,,, | Performed by: PODIATRIST

## 2020-11-03 PROCEDURE — 3008F BODY MASS INDEX DOCD: CPT | Mod: CPTII,S$GLB,, | Performed by: PODIATRIST

## 2020-11-03 PROCEDURE — 99999 PR PBB SHADOW E&M-EST. PATIENT-LVL III: ICD-10-PCS | Mod: PBBFAC,,, | Performed by: PODIATRIST

## 2020-11-03 NOTE — PROGRESS NOTES
Chief Complaint   Patient presents with    Callouses          HPI:   Deja Theodore is a 50 y.o. female who presents to clinic with complaints of bilateral foot pain, due to painful calluses sub metatarsal heads bilateral.  She is usually in athletic shoes with arch supports.    She has used urea lotion but not consistently.   She was prescribed a topical prescription medication.  Admits to not using it as prescribed.          Patient Active Problem List   Diagnosis    Sciatica    Spondylosis with myelopathy, lumbar region    Degeneration of lumbar or lumbosacral intervertebral disc    Spondylolysis of lumbar region    Spondylolisthesis    Myopia of both eyes - Both Eyes    Gall bladder polyp    Headache, menstrual migraine, intractable    Bilateral carpal tunnel syndrome    Calculus of gallbladder without cholecystitis without obstruction    Paronychia of finger, right    Endometriosis         Current Outpatient Medications on File Prior to Visit   Medication Sig Dispense Refill    acetaminophen-codeine 300-30mg (TYLENOL #3) 300-30 mg Tab Take by mouth.      aspirin 325 MG tablet Take 325 mg by mouth once daily.      astragalus root (ASTRAGALUS ORAL)       glucosamine/chondr prince A sod (GLUCOSAMINE-CHONDROITIN) 1,500-1,200 mg/30 mL Liqd       hydrOXYzine (ATARAX) 50 MG tablet TK 1 T PO  HS PRN FOR INSOMNIA  0    ibuprofen (ADVIL,MOTRIN) 600 MG tablet TAKE 1 TABLET(600 MG) BY MOUTH EVERY 6 HOURS AS NEEDED 30 tablet 0    lysine HCl/vitamin B complex (B COMPLEX-LYSINE ORAL)       naproxen (NAPROSYN) 250 MG tablet Take 250 mg by mouth as needed.      sertraline (ZOLOFT) 100 MG tablet Take 200 mg by mouth once daily.      traZODone (DESYREL) 50 MG tablet        No current facility-administered medications on file prior to visit.            ALLG:  Review of patient's allergies indicates:   Allergen Reactions    No known allergies            Social History     Socioeconomic History    Marital  "status:      Spouse name: Not on file    Number of children: 2    Years of education: Not on file    Highest education level: Not on file   Occupational History     Employer: not employed   Social Needs    Financial resource strain: Not very hard    Food insecurity     Worry: Never true     Inability: Never true    Transportation needs     Medical: No     Non-medical: No   Tobacco Use    Smoking status: Former Smoker     Packs/day: 0.30     Years: 10.00     Pack years: 3.00     Types: Cigarettes     Quit date: 9/26/2005     Years since quitting: 15.1    Smokeless tobacco: Never Used   Substance and Sexual Activity    Alcohol use: Not Currently     Frequency: Never     Drinks per session: Patient refused     Binge frequency: Never     Comment: Rare    Drug use: No    Sexual activity: Not Currently     Partners: Male     Birth control/protection: None   Lifestyle    Physical activity     Days per week: 7 days     Minutes per session: 30 min    Stress: To some extent   Relationships    Social connections     Talks on phone: More than three times a week     Gets together: Once a week     Attends Restorationist service: Not on file     Active member of club or organization: Yes     Attends meetings of clubs or organizations: 1 to 4 times per year     Relationship status:    Other Topics Concern    Not on file   Social History Narrative    Not on file         ROS:  General ROS: negative for - chills, fatigue or fever  Cardiovascular ROS: no chest pain or dyspnea on exertion  Musculoskeletal ROS: negative for - joint pain or joint stiffness   Skin: Negative for rash, negative for nail or hair changes. Positive for  Corn/callus on the foot.         EXAM:  Vitals:    11/03/20 1338   BP: (!) 106/57   BP Location: Left arm   Patient Position: Sitting   BP Method: Medium (Automatic)   Pulse: 83   Weight: 51.7 kg (113 lb 15.7 oz)   Height: 5' 2" (1.575 m)        General: alert and orient x 3, " well-developed, well-nourished and in no apparent distress.    Vasc: Palpable pedal pulses, feet appropriately warm to touch. Capillary refill time within normal limits.   Neuro: Epicritic sensation intact.  No focal deficits. No Tinels.   MSK: bilateral mild bunion deformity, minimal pes planus, arch still maintained weight bearing. Minimal equinus bilateral   Derm:  Multiple Nucleated porokeratosis, bilateral sub 2nd met head.  Tender to palpation.  No other open lesions, macerations, or rashes noted.   No redness or swelling           Assessment / Plan:    Problem List Items Addressed This Visit     None      Visit Diagnoses     Plantar porokeratosis, acquired    -  Primary    Bilateral foot pain              I counseled the patient on her conditions, their implications and medical management.     Plantar porokeratosis, acquired    Bilateral foot pain    - continue custom foot orthotics  - never walk barefoot  - moisturize feet daily; consider urea foot soaks daily as prescribed.    Call or return to clinic prn if these symptoms worsen or fail to improve as anticipated.

## 2020-11-04 ENCOUNTER — OFFICE VISIT (OUTPATIENT)
Dept: OBSTETRICS AND GYNECOLOGY | Facility: CLINIC | Age: 50
End: 2020-11-04
Attending: OBSTETRICS & GYNECOLOGY
Payer: COMMERCIAL

## 2020-11-04 VITALS
DIASTOLIC BLOOD PRESSURE: 60 MMHG | SYSTOLIC BLOOD PRESSURE: 116 MMHG | HEIGHT: 62 IN | WEIGHT: 121.5 LBS | BODY MASS INDEX: 22.36 KG/M2

## 2020-11-04 DIAGNOSIS — Z01.419 WELL FEMALE EXAM WITH ROUTINE GYNECOLOGICAL EXAM: Primary | ICD-10-CM

## 2020-11-04 PROCEDURE — 3008F BODY MASS INDEX DOCD: CPT | Mod: CPTII,S$GLB,, | Performed by: OBSTETRICS & GYNECOLOGY

## 2020-11-04 PROCEDURE — 99999 PR PBB SHADOW E&M-EST. PATIENT-LVL IV: ICD-10-PCS | Mod: PBBFAC,,, | Performed by: OBSTETRICS & GYNECOLOGY

## 2020-11-04 PROCEDURE — 99396 PR PREVENTIVE VISIT,EST,40-64: ICD-10-PCS | Mod: S$GLB,,, | Performed by: OBSTETRICS & GYNECOLOGY

## 2020-11-04 PROCEDURE — 3008F PR BODY MASS INDEX (BMI) DOCUMENTED: ICD-10-PCS | Mod: CPTII,S$GLB,, | Performed by: OBSTETRICS & GYNECOLOGY

## 2020-11-04 PROCEDURE — 99396 PREV VISIT EST AGE 40-64: CPT | Mod: S$GLB,,, | Performed by: OBSTETRICS & GYNECOLOGY

## 2020-11-04 PROCEDURE — 99999 PR PBB SHADOW E&M-EST. PATIENT-LVL IV: CPT | Mod: PBBFAC,,, | Performed by: OBSTETRICS & GYNECOLOGY

## 2020-11-04 NOTE — PROGRESS NOTES
SUBJECTIVE:   50 y.o. female   for routine gyn exam. Patient's last menstrual period was 2019 (approximate)..  She has no unusual complaints.  No ERT.  No PMB.          Past Medical History:   Diagnosis Date    Anemia     Arthritis     Carpal tunnel syndrome     Depression     Keratitis secondary to contact lens     OD    Other disorders of eyelid(374.89)     MGD    Spondylolisthesis      Past Surgical History:   Procedure Laterality Date    ADENOIDECTOMY      DIAGNOSTIC LAPAROSCOPY N/A 3/18/2019    Procedure: LAPAROSCOPY, DIAGNOSTIC;  Surgeon: Peggy Carrero MD;  Location: [facility];  Service: OB/GYN;  Laterality: N/A;    EPIDURAL ANESTHESIA      HYSTERECTOMY      NOSE SURGERY      Age 18-cosmetic    PARTIAL HYSTERECTOMY  4/15/2019    Procedure: HYSTERECTOMY, SUPRACERVICAL;  Surgeon: Peggy Carrero MD;  Location: [facility];  Service: OB/GYN;;  with bilateral salpingo-oopherectomy    TONSILLECTOMY       Social History     Socioeconomic History    Marital status:      Spouse name: Not on file    Number of children: 2    Years of education: Not on file    Highest education level: Not on file   Occupational History     Employer: not employed   Social Needs    Financial resource strain: Not very hard    Food insecurity     Worry: Never true     Inability: Never true    Transportation needs     Medical: No     Non-medical: No   Tobacco Use    Smoking status: Former Smoker     Packs/day: 0.30     Years: 10.00     Pack years: 3.00     Types: Cigarettes     Quit date: 2005     Years since quitting: 15.1    Smokeless tobacco: Never Used   Substance and Sexual Activity    Alcohol use: Not Currently     Frequency: Never     Drinks per session: Patient refused     Binge frequency: Never     Comment: Rare    Drug use: No    Sexual activity: Not Currently     Partners: Male     Birth control/protection: None   Lifestyle    Physical activity     Days per week: 7 days      Minutes per session: 30 min    Stress: To some extent   Relationships    Social connections     Talks on phone: More than three times a week     Gets together: Once a week     Attends Christian service: Not on file     Active member of club or organization: Yes     Attends meetings of clubs or organizations: 1 to 4 times per year     Relationship status:    Other Topics Concern    Not on file   Social History Narrative    Not on file     Family History   Problem Relation Age of Onset    Depression Mother     Mental illness Mother     Hypertension Mother     Hypertension Father     Cancer Sister     Asthma Brother     Mental illness Brother     Asthma Son     Amblyopia Neg Hx     Blindness Neg Hx     Cataracts Neg Hx     Diabetes Neg Hx     Glaucoma Neg Hx     Macular degeneration Neg Hx     Retinal detachment Neg Hx     Strabismus Neg Hx     Stroke Neg Hx     Thyroid disease Neg Hx     Breast cancer Neg Hx      OB History    Para Term  AB Living   1 1 1 0   1   SAB TAB Ectopic Multiple Live Births           1      # Outcome Date GA Lbr Khurram/2nd Weight Sex Delivery Anes PTL Lv   1 Term /    M Vag-Spont   ABUNDIO         Current Outpatient Medications   Medication Sig Dispense Refill    acetaminophen-codeine 300-30mg (TYLENOL #3) 300-30 mg Tab Take by mouth.      aspirin 325 MG tablet Take 325 mg by mouth once daily.      astragalus root (ASTRAGALUS ORAL)       glucosamine/chondr prince A sod (GLUCOSAMINE-CHONDROITIN) 1,500-1,200 mg/30 mL Liqd       hydrOXYzine (ATARAX) 50 MG tablet TK 1 T PO  HS PRN FOR INSOMNIA  0    ibuprofen (ADVIL,MOTRIN) 600 MG tablet TAKE 1 TABLET(600 MG) BY MOUTH EVERY 6 HOURS AS NEEDED 30 tablet 0    lysine HCl/vitamin B complex (B COMPLEX-LYSINE ORAL)       naproxen (NAPROSYN) 250 MG tablet Take 250 mg by mouth as needed.      sertraline (ZOLOFT) 100 MG tablet Take 200 mg by mouth once daily.      traZODone (DESYREL) 50 MG tablet     "    No current facility-administered medications for this visit.      Allergies: No known allergies     ROS:  Constitutional: no weight loss, weight gain, fever, fatigue  Eyes:  No vision changes, glasses/contacts  ENT/Mouth: No ulcers, sinus problems, ears ringing, headache  Cardiovascular: No inability to lie flat, chest pain, exercise intolerance, swelling, heart palpitations  Respiratory: No wheezing, coughing blood, shortness of breath, or cough  Gastrointestinal: No diarrhea, bloody stool, nausea/vomiting, constipation, gas, hemorrhoids  Genitourinary: No blood in urine, painful urination, urgency of urination, frequency of urination, incomplete emptying, incontinence, abnormal bleeding, painful periods, heavy periods, vaginal discharge, vaginal odor, painful intercourse, sexual problems, bleeding after intercourse.  Musculoskeletal: No muscle weakness  Skin/Breast: No painful breasts, nipple discharge, masses, rash, ulcers  Neurological: No passing out, seizures, numbness, headache  Endocrine: No diabetes, hypothyroid, hyperthyroid, hot flashes, hair loss, abnormal hair growth, acne  Psychiatric: No depression, crying  Hematologic: No bruises, bleeding, swollen lymph nodes, anemia.      OBJECTIVE:   The patient appears well, alert, oriented x 3, in no distress.  /60   Ht 5' 2" (1.575 m)   Wt 55.1 kg (121 lb 7.6 oz)   LMP 03/18/2019 (Approximate)   BMI 22.22 kg/m²   NECK: no thyromegaly, trachea midline  SKIN: no acne, striae, hirsutism  CHEST: CTAB  CV: RRR  BREAST EXAM: breasts appear normal, no suspicious masses, no skin or nipple changes or axillary nodes  ABDOMEN: no hernias, masses, or hepatosplenomegaly  GENITALIA: normal external genitalia, no erythema, no discharge  URETHRA: normal urethra, normal urethral meatus  VAGINA: Normal  CERVIX: normal, no lesions or cervical motion tenderness  UTERUS: absent  ADNEXA: no mass, fullness, tenderness      ASSESSMENT:   1. Well female exam with routine " gynecological exam         PLAN:      Discussed healthy lifestyle including regular exercise, healthy eating, etc.  Return to clinic in 1 year

## 2020-11-10 ENCOUNTER — OFFICE VISIT (OUTPATIENT)
Dept: OPTOMETRY | Facility: CLINIC | Age: 50
End: 2020-11-10
Payer: COMMERCIAL

## 2020-11-10 DIAGNOSIS — H52.203 MYOPIA WITH ASTIGMATISM AND PRESBYOPIA, BILATERAL: Primary | ICD-10-CM

## 2020-11-10 DIAGNOSIS — H52.13 MYOPIA WITH ASTIGMATISM AND PRESBYOPIA, BILATERAL: Primary | ICD-10-CM

## 2020-11-10 DIAGNOSIS — H52.4 MYOPIA WITH ASTIGMATISM AND PRESBYOPIA, BILATERAL: Primary | ICD-10-CM

## 2020-11-10 PROCEDURE — 99499 NO LOS: ICD-10-PCS | Mod: S$GLB,,, | Performed by: OPTOMETRIST

## 2020-11-10 PROCEDURE — 99499 UNLISTED E&M SERVICE: CPT | Mod: S$GLB,,, | Performed by: OPTOMETRIST

## 2020-11-13 ENCOUNTER — HOSPITAL ENCOUNTER (OUTPATIENT)
Dept: RADIOLOGY | Facility: OTHER | Age: 50
Discharge: HOME | End: 2020-11-13
Attending: OBSTETRICS & GYNECOLOGY
Payer: COMMERCIAL

## 2020-11-13 DIAGNOSIS — Z12.31 BREAST CANCER SCREENING BY MAMMOGRAM: ICD-10-CM

## 2020-11-13 PROCEDURE — 77067 MAMMO DIGITAL SCREENING BILAT WITH TOMO: ICD-10-PCS | Mod: 26,,, | Performed by: RADIOLOGY

## 2020-11-13 PROCEDURE — 77067 SCR MAMMO BI INCL CAD: CPT | Mod: 26,,, | Performed by: RADIOLOGY

## 2020-11-13 PROCEDURE — 77067 SCR MAMMO BI INCL CAD: CPT | Mod: TC

## 2020-11-13 PROCEDURE — 77063 MAMMO DIGITAL SCREENING BILAT WITH TOMO: ICD-10-PCS | Mod: 26,,, | Performed by: RADIOLOGY

## 2020-11-13 PROCEDURE — 77063 BREAST TOMOSYNTHESIS BI: CPT | Mod: 26,,, | Performed by: RADIOLOGY

## 2020-11-24 ENCOUNTER — PATIENT MESSAGE (OUTPATIENT)
Dept: OBSTETRICS AND GYNECOLOGY | Facility: CLINIC | Age: 50
End: 2020-11-24

## 2020-11-24 ENCOUNTER — PATIENT MESSAGE (OUTPATIENT)
Dept: OPTOMETRY | Facility: CLINIC | Age: 50
End: 2020-11-24

## 2020-11-24 DIAGNOSIS — Z13.29 SCREENING FOR THYROID DISORDER: Primary | ICD-10-CM

## 2020-11-25 ENCOUNTER — TELEPHONE (OUTPATIENT)
Dept: OBSTETRICS AND GYNECOLOGY | Facility: CLINIC | Age: 50
End: 2020-11-25

## 2020-11-25 ENCOUNTER — PATIENT MESSAGE (OUTPATIENT)
Dept: OBSTETRICS AND GYNECOLOGY | Facility: CLINIC | Age: 50
End: 2020-11-25

## 2020-11-25 NOTE — TELEPHONE ENCOUNTER
----- Message from Trell Rose sent at 11/25/2020  1:59 PM CST -----  Patient is calling to schedule for labs that was discussed, pt phone number is [phone]. Thanks

## 2020-11-27 ENCOUNTER — LAB VISIT (OUTPATIENT)
Dept: LAB | Facility: HOSPITAL | Age: 50
End: 2020-11-27
Attending: OBSTETRICS & GYNECOLOGY
Payer: COMMERCIAL

## 2020-11-27 DIAGNOSIS — Z13.29 SCREENING FOR THYROID DISORDER: ICD-10-CM

## 2020-11-27 LAB
T4 FREE SERPL-MCNC: 0.95 NG/DL (ref 0.71–1.51)
TSH SERPL DL<=0.005 MIU/L-ACNC: 2.65 UIU/ML (ref 0.4–4)

## 2020-11-27 PROCEDURE — 36415 COLL VENOUS BLD VENIPUNCTURE: CPT

## 2020-11-27 PROCEDURE — 84443 ASSAY THYROID STIM HORMONE: CPT

## 2020-11-27 PROCEDURE — 84439 ASSAY OF FREE THYROXINE: CPT

## 2020-11-29 ENCOUNTER — PATIENT MESSAGE (OUTPATIENT)
Dept: ORTHOPEDICS | Facility: CLINIC | Age: 50
End: 2020-11-29

## 2020-11-30 ENCOUNTER — PATIENT MESSAGE (OUTPATIENT)
Dept: ORTHOPEDICS | Facility: CLINIC | Age: 50
End: 2020-11-30

## 2020-11-30 ENCOUNTER — HOSPITAL ENCOUNTER (OUTPATIENT)
Dept: RADIOLOGY | Facility: HOSPITAL | Age: 50
Discharge: HOME | End: 2020-11-30
Attending: NURSE PRACTITIONER
Payer: COMMERCIAL

## 2020-11-30 ENCOUNTER — OFFICE VISIT (OUTPATIENT)
Dept: ORTHOPEDICS | Facility: CLINIC | Age: 50
End: 2020-11-30
Payer: COMMERCIAL

## 2020-11-30 DIAGNOSIS — L03.818 CELLULITIS OF OTHER SPECIFIED SITE: ICD-10-CM

## 2020-11-30 DIAGNOSIS — M25.561 RIGHT KNEE PAIN, UNSPECIFIED CHRONICITY: Primary | ICD-10-CM

## 2020-11-30 DIAGNOSIS — M25.561 RIGHT KNEE PAIN, UNSPECIFIED CHRONICITY: ICD-10-CM

## 2020-11-30 PROCEDURE — 73564 XR KNEE ORTHO RIGHT WITH FLEXION: ICD-10-PCS | Mod: 26,RT,, | Performed by: RADIOLOGY

## 2020-11-30 PROCEDURE — 99212 OFFICE O/P EST SF 10 MIN: CPT | Mod: S$GLB,,, | Performed by: NURSE PRACTITIONER

## 2020-11-30 PROCEDURE — 73562 XR KNEE ORTHO RIGHT WITH FLEXION: ICD-10-PCS | Mod: 26,LT,, | Performed by: RADIOLOGY

## 2020-11-30 PROCEDURE — 73562 X-RAY EXAM OF KNEE 3: CPT | Mod: 26,LT,, | Performed by: RADIOLOGY

## 2020-11-30 PROCEDURE — 99999 PR PBB SHADOW E&M-EST. PATIENT-LVL III: ICD-10-PCS | Mod: PBBFAC,,, | Performed by: NURSE PRACTITIONER

## 2020-11-30 PROCEDURE — 1125F AMNT PAIN NOTED PAIN PRSNT: CPT | Mod: S$GLB,,, | Performed by: NURSE PRACTITIONER

## 2020-11-30 PROCEDURE — 99212 PR OFFICE/OUTPT VISIT, EST, LEVL II, 10-19 MIN: ICD-10-PCS | Mod: S$GLB,,, | Performed by: NURSE PRACTITIONER

## 2020-11-30 PROCEDURE — 1125F PR PAIN SEVERITY QUANTIFIED, PAIN PRESENT: ICD-10-PCS | Mod: S$GLB,,, | Performed by: NURSE PRACTITIONER

## 2020-11-30 PROCEDURE — 73564 X-RAY EXAM KNEE 4 OR MORE: CPT | Mod: 26,RT,, | Performed by: RADIOLOGY

## 2020-11-30 PROCEDURE — 99999 PR PBB SHADOW E&M-EST. PATIENT-LVL III: CPT | Mod: PBBFAC,,, | Performed by: NURSE PRACTITIONER

## 2020-11-30 PROCEDURE — 73562 X-RAY EXAM OF KNEE 3: CPT | Mod: TC,LT,59

## 2020-11-30 RX ORDER — CEFADROXIL 1000 MG/1
1 TABLET ORAL 2 TIMES DAILY
Qty: 14 TABLET | Refills: 0 | Status: SHIPPED | OUTPATIENT
Start: 2020-11-30 | End: 2021-03-05

## 2020-11-30 NOTE — PROGRESS NOTES
CC: Pain of the Right Knee      HPI: Pt with c/o acute onset of right knee pain which started 2 days ago while she was watching TV. She reports sudden onset of pain and erythema over the knee which has only gotten worse. She denies fever or chills. She denies injury, but she does do yoga and she kneels at times for that. She is very active. The pain and stiffness have affected her ability to use her right leg well. She is ambulating without assistive device and there is not a limp.    ROS  General: denies fever and chills  Resp: no c/o sob  CVS: no c/o cp  MSK: c/o right knee pain, swelling and redness    PE  General: AAOx3, pleasant and cooperative  Resp: respirations even and unlabored  MSK: right knee exam  0 degrees extension  120 degrees flexion  + warmth and erythema   + prepatellar swelling    Xray:  Reviewed by me with the patient: There is prepatellar edema.  This could be prepatellar bursitis.  No fracture dislocation bone destruction or OCD seen.  There is a fibrous cortical defect of the distal femur.    Assessment:  Prepatellar bursitis, right knee    Plan:  duricef 1gm po bid starting today for cellulitis  Once the erythema resolves, if there is still prepatellar swelling will consider aspiration

## 2020-12-02 ENCOUNTER — TELEPHONE (OUTPATIENT)
Dept: PRIMARY CARE CLINIC | Facility: CLINIC | Age: 50
End: 2020-12-02

## 2020-12-02 ENCOUNTER — PATIENT MESSAGE (OUTPATIENT)
Dept: ORTHOPEDICS | Facility: CLINIC | Age: 50
End: 2020-12-02

## 2020-12-04 ENCOUNTER — OFFICE VISIT (OUTPATIENT)
Dept: ORTHOPEDICS | Facility: CLINIC | Age: 50
End: 2020-12-04
Payer: COMMERCIAL

## 2020-12-04 DIAGNOSIS — M70.41 PREPATELLAR BURSITIS OF RIGHT KNEE: ICD-10-CM

## 2020-12-04 PROCEDURE — 99499 NO LOS: ICD-10-PCS | Mod: S$GLB,,, | Performed by: NURSE PRACTITIONER

## 2020-12-04 PROCEDURE — 99999 PR PBB SHADOW E&M-EST. PATIENT-LVL III: CPT | Mod: PBBFAC,,, | Performed by: NURSE PRACTITIONER

## 2020-12-04 PROCEDURE — 20610 DRAIN/INJ JOINT/BURSA W/O US: CPT | Mod: RT,S$GLB,, | Performed by: NURSE PRACTITIONER

## 2020-12-04 PROCEDURE — 99499 UNLISTED E&M SERVICE: CPT | Mod: S$GLB,,, | Performed by: NURSE PRACTITIONER

## 2020-12-04 PROCEDURE — 99999 PR PBB SHADOW E&M-EST. PATIENT-LVL III: ICD-10-PCS | Mod: PBBFAC,,, | Performed by: NURSE PRACTITIONER

## 2020-12-04 PROCEDURE — 20610 PR DRAIN/INJECT LARGE JOINT/BURSA: ICD-10-PCS | Mod: RT,S$GLB,, | Performed by: NURSE PRACTITIONER

## 2020-12-04 PROCEDURE — 1125F PR PAIN SEVERITY QUANTIFIED, PAIN PRESENT: ICD-10-PCS | Mod: S$GLB,,, | Performed by: NURSE PRACTITIONER

## 2020-12-04 PROCEDURE — 1125F AMNT PAIN NOTED PAIN PRSNT: CPT | Mod: S$GLB,,, | Performed by: NURSE PRACTITIONER

## 2020-12-04 RX ORDER — TRIAMCINOLONE ACETONIDE 40 MG/ML
40 INJECTION, SUSPENSION INTRA-ARTICULAR; INTRAMUSCULAR
Status: COMPLETED | OUTPATIENT
Start: 2020-12-04 | End: 2020-12-04

## 2020-12-04 RX ADMIN — TRIAMCINOLONE ACETONIDE 40 MG: 40 INJECTION, SUSPENSION INTRA-ARTICULAR; INTRAMUSCULAR at 03:12

## 2020-12-04 NOTE — PROGRESS NOTES
Pt returns for aspiration of the prepatellar bursa. She has completed duricef antibiotics and the erythema and warmth have resolved.       Pre-operative Diagnosis: right knee prepatellar bursitis    Post-operative Diagnosis: same    Indications: right knee pain    Anesthesia: none    Procedure Details     Verbal consent was obtained for the procedure.An 18 gauge needle was inserted into the superior aspect of the bursa from a lateral approach. 10 ml of blood tinged clear fluid was removed from the joint and discarded. 2% lidocaine 1 ml and 40mg of kenalog was then injected into the joint through the same needle. The needle was removed and the area cleansed and dressed.    Complications:  None; patient tolerated the procedure well.    She will f/u as needed.

## 2020-12-05 ENCOUNTER — TELEPHONE (OUTPATIENT)
Dept: INTERNAL MEDICINE | Facility: CLINIC | Age: 50
End: 2020-12-05

## 2020-12-05 DIAGNOSIS — Z03.818 ENCOUNTER FOR OBSERVATION FOR SUSPECTED EXPOSURE TO OTHER BIOLOGICAL AGENTS RULED OUT: ICD-10-CM

## 2020-12-05 NOTE — TELEPHONE ENCOUNTER
----- Message from Pina Decker sent at 12/4/2020  5:16 PM CST -----  Regarding: Orders  Contact: [facility] Request  [employer] Patient Portal Request     Patient is requesting an order be submitted to have a covid test completed   Please assist patient with request     Patient can be reached at [phone]

## 2020-12-07 ENCOUNTER — LAB VISIT (OUTPATIENT)
Dept: INTERNAL MEDICINE | Facility: CLINIC | Age: 50
End: 2020-12-07
Attending: FAMILY MEDICINE
Payer: COMMERCIAL

## 2020-12-07 DIAGNOSIS — Z03.818 ENCOUNTER FOR OBSERVATION FOR SUSPECTED EXPOSURE TO OTHER BIOLOGICAL AGENTS RULED OUT: ICD-10-CM

## 2020-12-07 PROCEDURE — U0003 INFECTIOUS AGENT DETECTION BY NUCLEIC ACID (DNA OR RNA); SEVERE ACUTE RESPIRATORY SYNDROME CORONAVIRUS 2 (SARS-COV-2) (CORONAVIRUS DISEASE [COVID-19]), AMPLIFIED PROBE TECHNIQUE, MAKING USE OF HIGH THROUGHPUT TECHNOLOGIES AS DESCRIBED BY CMS-2020-01-R: HCPCS

## 2020-12-08 LAB — SARS-COV-2 RNA RESP QL NAA+PROBE: NOT DETECTED

## 2020-12-11 ENCOUNTER — OFFICE VISIT (OUTPATIENT)
Dept: INTERNAL MEDICINE | Facility: CLINIC | Age: 50
End: 2020-12-11
Payer: COMMERCIAL

## 2020-12-11 VITALS
HEART RATE: 89 BPM | HEIGHT: 62 IN | WEIGHT: 118.19 LBS | TEMPERATURE: 97 F | DIASTOLIC BLOOD PRESSURE: 70 MMHG | SYSTOLIC BLOOD PRESSURE: 110 MMHG | BODY MASS INDEX: 21.75 KG/M2

## 2020-12-11 DIAGNOSIS — Z13.220 SCREENING FOR LIPID DISORDERS: ICD-10-CM

## 2020-12-11 DIAGNOSIS — Z00.00 ANNUAL PHYSICAL EXAM: Primary | ICD-10-CM

## 2020-12-11 PROCEDURE — 99396 PR PREVENTIVE VISIT,EST,40-64: ICD-10-PCS | Mod: S$GLB,,, | Performed by: NURSE PRACTITIONER

## 2020-12-11 PROCEDURE — 3008F BODY MASS INDEX DOCD: CPT | Mod: CPTII,S$GLB,, | Performed by: NURSE PRACTITIONER

## 2020-12-11 PROCEDURE — 1126F AMNT PAIN NOTED NONE PRSNT: CPT | Mod: S$GLB,,, | Performed by: NURSE PRACTITIONER

## 2020-12-11 PROCEDURE — 99999 PR PBB SHADOW E&M-EST. PATIENT-LVL III: CPT | Mod: PBBFAC,,, | Performed by: NURSE PRACTITIONER

## 2020-12-11 PROCEDURE — 3008F PR BODY MASS INDEX (BMI) DOCUMENTED: ICD-10-PCS | Mod: CPTII,S$GLB,, | Performed by: NURSE PRACTITIONER

## 2020-12-11 PROCEDURE — 1126F PR PAIN SEVERITY QUANTIFIED, NO PAIN PRESENT: ICD-10-PCS | Mod: S$GLB,,, | Performed by: NURSE PRACTITIONER

## 2020-12-11 PROCEDURE — 99396 PREV VISIT EST AGE 40-64: CPT | Mod: S$GLB,,, | Performed by: NURSE PRACTITIONER

## 2020-12-11 PROCEDURE — 99999 PR PBB SHADOW E&M-EST. PATIENT-LVL III: ICD-10-PCS | Mod: PBBFAC,,, | Performed by: NURSE PRACTITIONER

## 2020-12-11 NOTE — PROGRESS NOTES
[facility] Note    Chief Complaint      Chief Complaint   Patient presents with    Establish Care     History of Present Illness      Deja Theodore is a 50 y.o. female patient who is new to me and presents today for establish care visit.  Patient feeling well no complaints, denies shortness of breath chest pain, reviewed meds and history patient.  Patient is past tearing, stays hydrated, works out at home.    Labs- ordered  Gyn- Dr. Carrero, [facility]  Mammogram- [facility]  Colonoscopy- [facility],   Vaccines- declines this year    Problem List Items Addressed This Visit     None      Visit Diagnoses     Annual physical exam    -  Primary    Relevant Orders    CBC Auto Differential    Comprehensive Metabolic Panel    Lipid Panel    URINALYSIS    Screening for lipid disorders        Relevant Orders    CBC Auto Differential    Comprehensive Metabolic Panel    Lipid Panel    URINALYSIS          Health Maintenance   Topic Date Due    Hepatitis C Screening  1970    Mammogram  11/13/2021    Lipid Panel  06/09/2022    TETANUS VACCINE  07/31/2029       Past Medical History:   Diagnosis Date    Anemia     Arthritis     Carpal tunnel syndrome     Depression     Keratitis secondary to contact lens     OD    Other disorders of eyelid(374.89)     MGD    Spondylolisthesis        Past Surgical History:   Procedure Laterality Date    ADENOIDECTOMY      DIAGNOSTIC LAPAROSCOPY N/A 3/18/2019    Procedure: LAPAROSCOPY, DIAGNOSTIC;  Surgeon: Peggy Carrero MD;  Location: [facility];  Service: OB/GYN;  Laterality: N/A;    EPIDURAL ANESTHESIA      NOSE SURGERY      Age 18-cosmetic    PARTIAL HYSTERECTOMY  4/15/2019    supracervical hyst / BSO- endo    TONSILLECTOMY         family history includes Asthma in her brother and son; Cancer in her sister; Depression in her mother; Hypertension in her father and mother; Mental illness in her brother and mother.    Social History     Tobacco Use    Smoking  status: Former Smoker     Packs/day: 0.30     Years: 10.00     Pack years: 3.00     Types: Cigarettes     Quit date: 9/26/2005     Years since quitting: 15.2    Smokeless tobacco: Never Used   Substance Use Topics    Alcohol use: Not Currently     Frequency: Never     Drinks per session: Patient refused     Binge frequency: Never     Comment: Rare    Drug use: No       Review of Systems   Constitutional: Negative for chills and fever.   HENT: Negative for congestion, sinus pain and sore throat.    Eyes: Negative for blurred vision.   Respiratory: Negative for cough, shortness of breath and wheezing.    Cardiovascular: Negative for chest pain, palpitations and leg swelling.   Gastrointestinal: Negative for abdominal pain, constipation, diarrhea, nausea and vomiting.   Genitourinary: Negative for dysuria.   Musculoskeletal: Negative for myalgias.   Skin: Negative for rash.   Neurological: Negative for dizziness, weakness and headaches.   Psychiatric/Behavioral: Negative for depression. The patient is not nervous/anxious.         Outpatient Encounter Medications as of 12/11/2020   Medication Sig Dispense Refill    acetaminophen-codeine 300-30mg (TYLENOL #3) 300-30 mg Tab Take by mouth.      aspirin 325 MG tablet Take 325 mg by mouth once daily.      astragalus root (ASTRAGALUS ORAL)       cefadroxil (DURICEF) 1 gram tablet Take 1 tablet (1 g total) by mouth 2 (two) times daily. 14 tablet 0    glucosamine/chondr prince A sod (GLUCOSAMINE-CHONDROITIN) 1,500-1,200 mg/30 mL Liqd       hydrOXYzine (ATARAX) 50 MG tablet TK 1 T PO  HS PRN FOR INSOMNIA  0    ibuprofen (ADVIL,MOTRIN) 600 MG tablet TAKE 1 TABLET(600 MG) BY MOUTH EVERY 6 HOURS AS NEEDED 30 tablet 0    lysine HCl/vitamin B complex (B COMPLEX-LYSINE ORAL)       naproxen (NAPROSYN) 250 MG tablet Take 250 mg by mouth as needed.      sertraline (ZOLOFT) 100 MG tablet Take 200 mg by mouth once daily.      [DISCONTINUED] traZODone (DESYREL) 50 MG tablet     "    No facility-administered encounter medications on file as of 12/11/2020.         Review of patient's allergies indicates:   Allergen Reactions    No known allergies        Physical Exam      Vital Signs  Temp: 97.3 °F (36.3 °C)  Temp src: Temporal  Pulse: 89  BP: 110/70  BP Location: Left arm  Patient Position: Sitting  Pain Score: 0-No pain  Height and Weight  Height: 5' 2" (157.5 cm)  Weight: 53.6 kg (118 lb 2.7 oz)  BSA (Calculated - sq m): 1.53 sq meters  BMI (Calculated): 21.6  Weight in (lb) to have BMI = 25: 136.4]    Physical Exam  Vitals signs and nursing note reviewed.   Constitutional:       Appearance: Normal appearance. She is well-developed.   HENT:      Head: Normocephalic and atraumatic.      Right Ear: External ear normal.      Left Ear: External ear normal.   Eyes:      Conjunctiva/sclera: Conjunctivae normal.      Pupils: Pupils are equal, round, and reactive to light.   Neck:      Musculoskeletal: Normal range of motion and neck supple.      Thyroid: No thyromegaly.      Vascular: No JVD.      Trachea: No tracheal deviation.   Cardiovascular:      Rate and Rhythm: Normal rate and regular rhythm.      Heart sounds: Normal heart sounds.   Pulmonary:      Effort: Pulmonary effort is normal.      Breath sounds: Normal breath sounds.   Abdominal:      General: Bowel sounds are normal.      Palpations: Abdomen is soft.   Musculoskeletal: Normal range of motion.   Lymphadenopathy:      Cervical: No cervical adenopathy.   Skin:     General: Skin is warm and dry.   Neurological:      Mental Status: She is alert and oriented to person, place, and time.   Psychiatric:         Behavior: Behavior normal.         Thought Content: Thought content normal.         Judgment: Judgment normal.          Laboratory:  CBC:  No results for input(s): WBC, RBC, HGB, HCT, PLT, MCV, MCH, MCHC in the last 2160 hours.  CMP:  No results for input(s): GLU, CALCIUM, ALBUMIN, PROT, NA, K, CO2, CL, BUN, ALKPHOS, ALT, AST, " BILITOT in the last 2160 hours.    Invalid input(s): CREATININ  URINALYSIS:  No results for input(s): COLORU, CLARITYU, SPECGRAV, PHUR, PROTEINUA, GLUCOSEU, BILIRUBINCON, BLOODU, WBCU, RBCU, BACTERIA, MUCUS, NITRITE, LEUKOCYTESUR, UROBILINOGEN, HYALINECASTS in the last 2160 hours.   LIPIDS:  Recent Labs   Lab Result Units 11/27/20  0914   TSH uIU/mL 2.645     TSH:  Recent Labs   Lab Result Units 11/27/20  0914   TSH uIU/mL 2.645     A1C:  No results for input(s): HGBA1C in the last 2160 hours.      Assessment/Plan     Deja Theodore is a 50 y.o.female with:    Encounter Diagnoses   Name Primary?    Annual physical exam Yes    Screening for lipid disorders         PLAN  Labs ordered          -Continue current medications and maintain follow up with specialists.  Return to clinic to establish with new PCP.      Erin Jiminez, NP-C [facility]

## 2020-12-14 ENCOUNTER — LAB VISIT (OUTPATIENT)
Dept: LAB | Facility: OTHER | Age: 50
End: 2020-12-14
Attending: NURSE PRACTITIONER
Payer: COMMERCIAL

## 2020-12-14 ENCOUNTER — PATIENT MESSAGE (OUTPATIENT)
Dept: OPTOMETRY | Facility: CLINIC | Age: 50
End: 2020-12-14

## 2020-12-14 DIAGNOSIS — Z00.00 ANNUAL PHYSICAL EXAM: ICD-10-CM

## 2020-12-14 DIAGNOSIS — Z13.220 SCREENING FOR LIPID DISORDERS: ICD-10-CM

## 2020-12-14 LAB
BACTERIA #/AREA URNS HPF: NORMAL /HPF
BILIRUB UR QL STRIP: NEGATIVE
CLARITY UR: CLEAR
COLOR UR: YELLOW
GLUCOSE UR QL STRIP: NEGATIVE
HGB UR QL STRIP: NEGATIVE
HYALINE CASTS #/AREA URNS LPF: 1 /LPF
KETONES UR QL STRIP: NEGATIVE
LEUKOCYTE ESTERASE UR QL STRIP: ABNORMAL
MICROSCOPIC COMMENT: NORMAL
NITRITE UR QL STRIP: NEGATIVE
PH UR STRIP: 6 [PH] (ref 5–8)
PROT UR QL STRIP: NEGATIVE
RBC #/AREA URNS HPF: 1 /HPF (ref 0–4)
SP GR UR STRIP: 1.02 (ref 1–1.03)
SQUAMOUS #/AREA URNS HPF: 2 /HPF
URN SPEC COLLECT METH UR: ABNORMAL
UROBILINOGEN UR STRIP-ACNC: NEGATIVE EU/DL
WBC #/AREA URNS HPF: 2 /HPF (ref 0–5)

## 2020-12-14 PROCEDURE — 81000 URINALYSIS NONAUTO W/SCOPE: CPT

## 2020-12-15 ENCOUNTER — PATIENT MESSAGE (OUTPATIENT)
Dept: OPTOMETRY | Facility: CLINIC | Age: 50
End: 2020-12-15

## 2020-12-15 ENCOUNTER — PATIENT MESSAGE (OUTPATIENT)
Dept: PODIATRY | Facility: CLINIC | Age: 50
End: 2020-12-15

## 2020-12-15 ENCOUNTER — PATIENT MESSAGE (OUTPATIENT)
Dept: INTERNAL MEDICINE | Facility: CLINIC | Age: 50
End: 2020-12-15

## 2020-12-15 ENCOUNTER — PATIENT MESSAGE (OUTPATIENT)
Dept: ORTHOPEDICS | Facility: CLINIC | Age: 50
End: 2020-12-15

## 2020-12-16 ENCOUNTER — PATIENT MESSAGE (OUTPATIENT)
Dept: INTERNAL MEDICINE | Facility: CLINIC | Age: 50
End: 2020-12-16

## 2020-12-16 NOTE — TELEPHONE ENCOUNTER
Cholesterol panel looks good, total cholesterol 4 point elevated but concerned about this it looks good.  Electrolytes, blood sugar, kidney and liver function all normal  No infection or anemia noted  Urinalysis shows no infection and no blood, trace white blood cells.  Stay hydrated    Overall labs look good

## 2020-12-17 ENCOUNTER — PATIENT MESSAGE (OUTPATIENT)
Dept: INTERNAL MEDICINE | Facility: CLINIC | Age: 50
End: 2020-12-17

## 2020-12-18 RX ORDER — MUPIROCIN 20 MG/G
OINTMENT TOPICAL 3 TIMES DAILY
Qty: 30 G | Refills: 0 | Status: SHIPPED | OUTPATIENT
Start: 2020-12-18 | End: 2021-08-16

## 2021-01-05 ENCOUNTER — PATIENT MESSAGE (OUTPATIENT)
Dept: ORTHOPEDICS | Facility: CLINIC | Age: 51
End: 2021-01-05

## 2021-01-14 ENCOUNTER — OFFICE VISIT (OUTPATIENT)
Dept: PODIATRY | Facility: CLINIC | Age: 51
End: 2021-01-14
Payer: COMMERCIAL

## 2021-01-14 VITALS
DIASTOLIC BLOOD PRESSURE: 76 MMHG | HEART RATE: 91 BPM | HEIGHT: 62 IN | SYSTOLIC BLOOD PRESSURE: 118 MMHG | BODY MASS INDEX: 21.71 KG/M2 | WEIGHT: 118 LBS

## 2021-01-14 DIAGNOSIS — L84 CALLUS OF FOOT: ICD-10-CM

## 2021-01-14 DIAGNOSIS — L85.1 PLANTAR POROKERATOSIS, ACQUIRED: Primary | ICD-10-CM

## 2021-01-14 PROCEDURE — 99999 PR PBB SHADOW E&M-EST. PATIENT-LVL III: ICD-10-PCS | Mod: PBBFAC,,, | Performed by: PODIATRIST

## 2021-01-14 PROCEDURE — 1125F AMNT PAIN NOTED PAIN PRSNT: CPT | Mod: S$GLB,,, | Performed by: PODIATRIST

## 2021-01-14 PROCEDURE — 3008F BODY MASS INDEX DOCD: CPT | Mod: CPTII,S$GLB,, | Performed by: PODIATRIST

## 2021-01-14 PROCEDURE — 99212 OFFICE O/P EST SF 10 MIN: CPT | Mod: S$GLB,,, | Performed by: PODIATRIST

## 2021-01-14 PROCEDURE — 99212 PR OFFICE/OUTPT VISIT, EST, LEVL II, 10-19 MIN: ICD-10-PCS | Mod: S$GLB,,, | Performed by: PODIATRIST

## 2021-01-14 PROCEDURE — 3008F PR BODY MASS INDEX (BMI) DOCUMENTED: ICD-10-PCS | Mod: CPTII,S$GLB,, | Performed by: PODIATRIST

## 2021-01-14 PROCEDURE — 99999 PR PBB SHADOW E&M-EST. PATIENT-LVL III: CPT | Mod: PBBFAC,,, | Performed by: PODIATRIST

## 2021-01-14 PROCEDURE — 1125F PR PAIN SEVERITY QUANTIFIED, PAIN PRESENT: ICD-10-PCS | Mod: S$GLB,,, | Performed by: PODIATRIST

## 2021-01-19 ENCOUNTER — PATIENT MESSAGE (OUTPATIENT)
Dept: OBSTETRICS AND GYNECOLOGY | Facility: CLINIC | Age: 51
End: 2021-01-19

## 2021-03-05 ENCOUNTER — PATIENT MESSAGE (OUTPATIENT)
Dept: OPTOMETRY | Facility: CLINIC | Age: 51
End: 2021-03-05

## 2021-03-05 ENCOUNTER — OFFICE VISIT (OUTPATIENT)
Dept: INTERNAL MEDICINE | Facility: CLINIC | Age: 51
End: 2021-03-05
Payer: COMMERCIAL

## 2021-03-05 VITALS
DIASTOLIC BLOOD PRESSURE: 70 MMHG | OXYGEN SATURATION: 99 % | TEMPERATURE: 98 F | WEIGHT: 123.88 LBS | HEART RATE: 90 BPM | BODY MASS INDEX: 22.8 KG/M2 | HEIGHT: 62 IN | SYSTOLIC BLOOD PRESSURE: 110 MMHG

## 2021-03-05 DIAGNOSIS — L53.9 NASAL ERYTHEMA: Primary | ICD-10-CM

## 2021-03-05 PROCEDURE — 1125F AMNT PAIN NOTED PAIN PRSNT: CPT | Mod: S$GLB,,, | Performed by: HOSPITALIST

## 2021-03-05 PROCEDURE — 1125F PR PAIN SEVERITY QUANTIFIED, PAIN PRESENT: ICD-10-PCS | Mod: S$GLB,,, | Performed by: HOSPITALIST

## 2021-03-05 PROCEDURE — 99213 OFFICE O/P EST LOW 20 MIN: CPT | Mod: S$GLB,,, | Performed by: HOSPITALIST

## 2021-03-05 PROCEDURE — 99999 PR PBB SHADOW E&M-EST. PATIENT-LVL IV: CPT | Mod: PBBFAC,,, | Performed by: HOSPITALIST

## 2021-03-05 PROCEDURE — 99999 PR PBB SHADOW E&M-EST. PATIENT-LVL IV: ICD-10-PCS | Mod: PBBFAC,,, | Performed by: HOSPITALIST

## 2021-03-05 PROCEDURE — 3008F BODY MASS INDEX DOCD: CPT | Mod: CPTII,S$GLB,, | Performed by: HOSPITALIST

## 2021-03-05 PROCEDURE — 99213 PR OFFICE/OUTPT VISIT, EST, LEVL III, 20-29 MIN: ICD-10-PCS | Mod: S$GLB,,, | Performed by: HOSPITALIST

## 2021-03-05 PROCEDURE — 3008F PR BODY MASS INDEX (BMI) DOCUMENTED: ICD-10-PCS | Mod: CPTII,S$GLB,, | Performed by: HOSPITALIST

## 2021-03-10 ENCOUNTER — OFFICE VISIT (OUTPATIENT)
Dept: OTOLARYNGOLOGY | Facility: CLINIC | Age: 51
End: 2021-03-10
Payer: COMMERCIAL

## 2021-03-10 VITALS
HEART RATE: 88 BPM | SYSTOLIC BLOOD PRESSURE: 120 MMHG | BODY MASS INDEX: 22.86 KG/M2 | WEIGHT: 125 LBS | DIASTOLIC BLOOD PRESSURE: 77 MMHG

## 2021-03-10 DIAGNOSIS — L53.9 NASAL ERYTHEMA: ICD-10-CM

## 2021-03-10 PROCEDURE — 99203 PR OFFICE/OUTPT VISIT, NEW, LEVL III, 30-44 MIN: ICD-10-PCS | Mod: S$GLB,,, | Performed by: NURSE PRACTITIONER

## 2021-03-10 PROCEDURE — 99999 PR PBB SHADOW E&M-EST. PATIENT-LVL III: ICD-10-PCS | Mod: PBBFAC,,, | Performed by: NURSE PRACTITIONER

## 2021-03-10 PROCEDURE — 99999 PR PBB SHADOW E&M-EST. PATIENT-LVL III: CPT | Mod: PBBFAC,,, | Performed by: NURSE PRACTITIONER

## 2021-03-10 PROCEDURE — 3008F PR BODY MASS INDEX (BMI) DOCUMENTED: ICD-10-PCS | Mod: CPTII,S$GLB,, | Performed by: NURSE PRACTITIONER

## 2021-03-10 PROCEDURE — 99203 OFFICE O/P NEW LOW 30 MIN: CPT | Mod: S$GLB,,, | Performed by: NURSE PRACTITIONER

## 2021-03-10 PROCEDURE — 3008F BODY MASS INDEX DOCD: CPT | Mod: CPTII,S$GLB,, | Performed by: NURSE PRACTITIONER

## 2021-03-10 PROCEDURE — 1125F AMNT PAIN NOTED PAIN PRSNT: CPT | Mod: S$GLB,,, | Performed by: NURSE PRACTITIONER

## 2021-03-10 PROCEDURE — 1125F PR PAIN SEVERITY QUANTIFIED, PAIN PRESENT: ICD-10-PCS | Mod: S$GLB,,, | Performed by: NURSE PRACTITIONER

## 2021-03-15 ENCOUNTER — PATIENT MESSAGE (OUTPATIENT)
Dept: OPTOMETRY | Facility: CLINIC | Age: 51
End: 2021-03-15

## 2021-03-15 ENCOUNTER — TELEPHONE (OUTPATIENT)
Dept: OPTOMETRY | Facility: CLINIC | Age: 51
End: 2021-03-15

## 2021-03-15 ENCOUNTER — OFFICE VISIT (OUTPATIENT)
Dept: URGENT CARE | Facility: CLINIC | Age: 51
End: 2021-03-15
Payer: COMMERCIAL

## 2021-03-15 VITALS
DIASTOLIC BLOOD PRESSURE: 93 MMHG | HEIGHT: 62 IN | WEIGHT: 118 LBS | BODY MASS INDEX: 21.71 KG/M2 | HEART RATE: 88 BPM | SYSTOLIC BLOOD PRESSURE: 145 MMHG | TEMPERATURE: 98 F | RESPIRATION RATE: 16 BRPM | OXYGEN SATURATION: 99 %

## 2021-03-15 DIAGNOSIS — Z11.59 SCREENING FOR VIRAL DISEASE: Primary | ICD-10-CM

## 2021-03-15 DIAGNOSIS — G51.0 FACIAL PARALYSIS/BELLS PALSY: ICD-10-CM

## 2021-03-15 LAB
CTP QC/QA: YES
SARS-COV-2 RDRP RESP QL NAA+PROBE: NEGATIVE

## 2021-03-15 PROCEDURE — 3008F BODY MASS INDEX DOCD: CPT | Mod: CPTII,S$GLB,, | Performed by: FAMILY MEDICINE

## 2021-03-15 PROCEDURE — 99214 OFFICE O/P EST MOD 30 MIN: CPT | Mod: S$GLB,,, | Performed by: FAMILY MEDICINE

## 2021-03-15 PROCEDURE — 3008F PR BODY MASS INDEX (BMI) DOCUMENTED: ICD-10-PCS | Mod: CPTII,S$GLB,, | Performed by: FAMILY MEDICINE

## 2021-03-15 PROCEDURE — 1126F PR PAIN SEVERITY QUANTIFIED, NO PAIN PRESENT: ICD-10-PCS | Mod: S$GLB,,, | Performed by: FAMILY MEDICINE

## 2021-03-15 PROCEDURE — U0002 COVID-19 LAB TEST NON-CDC: HCPCS | Mod: QW,S$GLB,, | Performed by: FAMILY MEDICINE

## 2021-03-15 PROCEDURE — 1126F AMNT PAIN NOTED NONE PRSNT: CPT | Mod: S$GLB,,, | Performed by: FAMILY MEDICINE

## 2021-03-15 PROCEDURE — 99214 PR OFFICE/OUTPT VISIT, EST, LEVL IV, 30-39 MIN: ICD-10-PCS | Mod: S$GLB,,, | Performed by: FAMILY MEDICINE

## 2021-03-15 PROCEDURE — U0002: ICD-10-PCS | Mod: QW,S$GLB,, | Performed by: FAMILY MEDICINE

## 2021-03-15 RX ORDER — PREDNISONE 10 MG/1
TABLET ORAL
Qty: 35 TABLET | Refills: 0 | Status: SHIPPED | OUTPATIENT
Start: 2021-03-15 | End: 2021-08-16

## 2021-03-15 RX ORDER — ERYTHROMYCIN 5 MG/G
OINTMENT OPHTHALMIC NIGHTLY
Qty: 3.5 G | Refills: 0 | Status: SHIPPED | OUTPATIENT
Start: 2021-03-15 | End: 2021-03-22

## 2021-03-15 RX ORDER — VALACYCLOVIR HYDROCHLORIDE 1 G/1
1000 TABLET, FILM COATED ORAL EVERY 8 HOURS
Qty: 21 TABLET | Refills: 0 | Status: SHIPPED | OUTPATIENT
Start: 2021-03-15 | End: 2021-08-16

## 2021-03-16 ENCOUNTER — OFFICE VISIT (OUTPATIENT)
Dept: OPTOMETRY | Facility: CLINIC | Age: 51
End: 2021-03-16
Payer: COMMERCIAL

## 2021-03-16 DIAGNOSIS — G51.0 RIGHT-SIDED BELL'S PALSY: Primary | ICD-10-CM

## 2021-03-16 PROCEDURE — 99999 PR PBB SHADOW E&M-EST. PATIENT-LVL III: CPT | Mod: PBBFAC,,, | Performed by: OPTOMETRIST

## 2021-03-16 PROCEDURE — 1125F PR PAIN SEVERITY QUANTIFIED, PAIN PRESENT: ICD-10-PCS | Mod: S$GLB,,, | Performed by: OPTOMETRIST

## 2021-03-16 PROCEDURE — 92012 PR EYE EXAM, EST PATIENT,INTERMED: ICD-10-PCS | Mod: S$GLB,,, | Performed by: OPTOMETRIST

## 2021-03-16 PROCEDURE — 1125F AMNT PAIN NOTED PAIN PRSNT: CPT | Mod: S$GLB,,, | Performed by: OPTOMETRIST

## 2021-03-16 PROCEDURE — 92012 INTRM OPH EXAM EST PATIENT: CPT | Mod: S$GLB,,, | Performed by: OPTOMETRIST

## 2021-03-16 PROCEDURE — 99999 PR PBB SHADOW E&M-EST. PATIENT-LVL III: ICD-10-PCS | Mod: PBBFAC,,, | Performed by: OPTOMETRIST

## 2021-03-17 ENCOUNTER — TELEPHONE (OUTPATIENT)
Dept: URGENT CARE | Facility: CLINIC | Age: 51
End: 2021-03-17

## 2021-08-16 ENCOUNTER — OFFICE VISIT (OUTPATIENT)
Dept: PODIATRY | Facility: CLINIC | Age: 51
End: 2021-08-16
Payer: COMMERCIAL

## 2021-08-16 VITALS
HEIGHT: 62 IN | BODY MASS INDEX: 21.71 KG/M2 | DIASTOLIC BLOOD PRESSURE: 57 MMHG | WEIGHT: 118 LBS | HEART RATE: 65 BPM | SYSTOLIC BLOOD PRESSURE: 105 MMHG

## 2021-08-16 DIAGNOSIS — L85.1 PLANTAR POROKERATOSIS, ACQUIRED: Primary | ICD-10-CM

## 2021-08-16 DIAGNOSIS — M77.42 METATARSALGIA OF BOTH FEET: ICD-10-CM

## 2021-08-16 DIAGNOSIS — M77.41 METATARSALGIA OF BOTH FEET: ICD-10-CM

## 2021-08-16 DIAGNOSIS — L84 CALLUS OF FOOT: ICD-10-CM

## 2021-08-16 PROCEDURE — 3074F SYST BP LT 130 MM HG: CPT | Mod: CPTII,S$GLB,, | Performed by: PODIATRIST

## 2021-08-16 PROCEDURE — 1159F PR MEDICATION LIST DOCUMENTED IN MEDICAL RECORD: ICD-10-PCS | Mod: CPTII,S$GLB,, | Performed by: PODIATRIST

## 2021-08-16 PROCEDURE — 3074F PR MOST RECENT SYSTOLIC BLOOD PRESSURE < 130 MM HG: ICD-10-PCS | Mod: CPTII,S$GLB,, | Performed by: PODIATRIST

## 2021-08-16 PROCEDURE — 99213 PR OFFICE/OUTPT VISIT, EST, LEVL III, 20-29 MIN: ICD-10-PCS | Mod: S$GLB,,, | Performed by: PODIATRIST

## 2021-08-16 PROCEDURE — 1160F PR REVIEW ALL MEDS BY PRESCRIBER/CLIN PHARMACIST DOCUMENTED: ICD-10-PCS | Mod: CPTII,S$GLB,, | Performed by: PODIATRIST

## 2021-08-16 PROCEDURE — 3008F PR BODY MASS INDEX (BMI) DOCUMENTED: ICD-10-PCS | Mod: CPTII,S$GLB,, | Performed by: PODIATRIST

## 2021-08-16 PROCEDURE — 99213 OFFICE O/P EST LOW 20 MIN: CPT | Mod: S$GLB,,, | Performed by: PODIATRIST

## 2021-08-16 PROCEDURE — 1125F AMNT PAIN NOTED PAIN PRSNT: CPT | Mod: CPTII,S$GLB,, | Performed by: PODIATRIST

## 2021-08-16 PROCEDURE — 3008F BODY MASS INDEX DOCD: CPT | Mod: CPTII,S$GLB,, | Performed by: PODIATRIST

## 2021-08-16 PROCEDURE — 3078F PR MOST RECENT DIASTOLIC BLOOD PRESSURE < 80 MM HG: ICD-10-PCS | Mod: CPTII,S$GLB,, | Performed by: PODIATRIST

## 2021-08-16 PROCEDURE — 3078F DIAST BP <80 MM HG: CPT | Mod: CPTII,S$GLB,, | Performed by: PODIATRIST

## 2021-08-16 PROCEDURE — 99999 PR PBB SHADOW E&M-EST. PATIENT-LVL III: ICD-10-PCS | Mod: PBBFAC,,, | Performed by: PODIATRIST

## 2021-08-16 PROCEDURE — 99999 PR PBB SHADOW E&M-EST. PATIENT-LVL III: CPT | Mod: PBBFAC,,, | Performed by: PODIATRIST

## 2021-08-16 PROCEDURE — 1160F RVW MEDS BY RX/DR IN RCRD: CPT | Mod: CPTII,S$GLB,, | Performed by: PODIATRIST

## 2021-08-16 PROCEDURE — 1125F PR PAIN SEVERITY QUANTIFIED, PAIN PRESENT: ICD-10-PCS | Mod: CPTII,S$GLB,, | Performed by: PODIATRIST

## 2021-08-16 PROCEDURE — 1159F MED LIST DOCD IN RCRD: CPT | Mod: CPTII,S$GLB,, | Performed by: PODIATRIST

## 2021-08-23 ENCOUNTER — PATIENT OUTREACH (OUTPATIENT)
Dept: ADMINISTRATIVE | Facility: HOSPITAL | Age: 51
End: 2021-08-23

## 2021-08-23 ENCOUNTER — PATIENT MESSAGE (OUTPATIENT)
Dept: ADMINISTRATIVE | Facility: HOSPITAL | Age: 51
End: 2021-08-23

## 2021-08-23 DIAGNOSIS — Z12.11 COLON CANCER SCREENING: Primary | ICD-10-CM

## 2022-02-18 DIAGNOSIS — Z12.31 OTHER SCREENING MAMMOGRAM: ICD-10-CM

## 2022-03-16 ENCOUNTER — PATIENT MESSAGE (OUTPATIENT)
Dept: ADMINISTRATIVE | Facility: HOSPITAL | Age: 52
End: 2022-03-16
Payer: COMMERCIAL

## 2022-04-20 ENCOUNTER — HOSPITAL ENCOUNTER (OUTPATIENT)
Dept: RADIOLOGY | Facility: HOSPITAL | Age: 52
Discharge: HOME | End: 2022-04-20
Attending: HOSPITALIST
Payer: COMMERCIAL

## 2022-04-20 DIAGNOSIS — Z12.31 OTHER SCREENING MAMMOGRAM: ICD-10-CM

## 2022-04-20 PROCEDURE — 77063 BREAST TOMOSYNTHESIS BI: CPT | Mod: 26,,, | Performed by: RADIOLOGY

## 2022-04-20 PROCEDURE — 77067 MAMMO DIGITAL SCREENING BILAT WITH TOMO: ICD-10-PCS | Mod: 26,,, | Performed by: RADIOLOGY

## 2022-04-20 PROCEDURE — 77063 MAMMO DIGITAL SCREENING BILAT WITH TOMO: ICD-10-PCS | Mod: 26,,, | Performed by: RADIOLOGY

## 2022-04-20 PROCEDURE — 77063 BREAST TOMOSYNTHESIS BI: CPT | Mod: TC,PO

## 2022-04-20 PROCEDURE — 77067 SCR MAMMO BI INCL CAD: CPT | Mod: 26,,, | Performed by: RADIOLOGY

## 2022-04-20 PROCEDURE — 77067 SCR MAMMO BI INCL CAD: CPT | Mod: TC,PO

## 2022-04-22 ENCOUNTER — LAB VISIT (OUTPATIENT)
Dept: LAB | Facility: HOSPITAL | Age: 52
End: 2022-04-22
Attending: OBSTETRICS & GYNECOLOGY
Payer: COMMERCIAL

## 2022-04-22 ENCOUNTER — OFFICE VISIT (OUTPATIENT)
Dept: OBSTETRICS AND GYNECOLOGY | Facility: CLINIC | Age: 52
End: 2022-04-22
Payer: COMMERCIAL

## 2022-04-22 VITALS
DIASTOLIC BLOOD PRESSURE: 60 MMHG | WEIGHT: 104.06 LBS | HEIGHT: 62 IN | BODY MASS INDEX: 19.15 KG/M2 | SYSTOLIC BLOOD PRESSURE: 98 MMHG

## 2022-04-22 DIAGNOSIS — Z11.3 SCREENING FOR VENEREAL DISEASE: ICD-10-CM

## 2022-04-22 DIAGNOSIS — Z01.419 WELL FEMALE EXAM WITH ROUTINE GYNECOLOGICAL EXAM: Primary | ICD-10-CM

## 2022-04-22 PROCEDURE — 99396 PR PREVENTIVE VISIT,EST,40-64: ICD-10-PCS | Mod: S$GLB,,, | Performed by: OBSTETRICS & GYNECOLOGY

## 2022-04-22 PROCEDURE — 3008F BODY MASS INDEX DOCD: CPT | Mod: CPTII,S$GLB,, | Performed by: OBSTETRICS & GYNECOLOGY

## 2022-04-22 PROCEDURE — 88175 CYTOPATH C/V AUTO FLUID REDO: CPT | Performed by: OBSTETRICS & GYNECOLOGY

## 2022-04-22 PROCEDURE — 87389 HIV-1 AG W/HIV-1&-2 AB AG IA: CPT | Performed by: OBSTETRICS & GYNECOLOGY

## 2022-04-22 PROCEDURE — 87481 CANDIDA DNA AMP PROBE: CPT | Mod: 59 | Performed by: OBSTETRICS & GYNECOLOGY

## 2022-04-22 PROCEDURE — 99396 PREV VISIT EST AGE 40-64: CPT | Mod: S$GLB,,, | Performed by: OBSTETRICS & GYNECOLOGY

## 2022-04-22 PROCEDURE — 3074F SYST BP LT 130 MM HG: CPT | Mod: CPTII,S$GLB,, | Performed by: OBSTETRICS & GYNECOLOGY

## 2022-04-22 PROCEDURE — 87801 DETECT AGNT MULT DNA AMPLI: CPT | Performed by: OBSTETRICS & GYNECOLOGY

## 2022-04-22 PROCEDURE — 87591 N.GONORRHOEAE DNA AMP PROB: CPT | Performed by: OBSTETRICS & GYNECOLOGY

## 2022-04-22 PROCEDURE — 99999 PR PBB SHADOW E&M-EST. PATIENT-LVL III: ICD-10-PCS | Mod: PBBFAC,,, | Performed by: OBSTETRICS & GYNECOLOGY

## 2022-04-22 PROCEDURE — 87491 CHLMYD TRACH DNA AMP PROBE: CPT | Performed by: OBSTETRICS & GYNECOLOGY

## 2022-04-22 PROCEDURE — 3078F DIAST BP <80 MM HG: CPT | Mod: CPTII,S$GLB,, | Performed by: OBSTETRICS & GYNECOLOGY

## 2022-04-22 PROCEDURE — 3078F PR MOST RECENT DIASTOLIC BLOOD PRESSURE < 80 MM HG: ICD-10-PCS | Mod: CPTII,S$GLB,, | Performed by: OBSTETRICS & GYNECOLOGY

## 2022-04-22 PROCEDURE — 3008F PR BODY MASS INDEX (BMI) DOCUMENTED: ICD-10-PCS | Mod: CPTII,S$GLB,, | Performed by: OBSTETRICS & GYNECOLOGY

## 2022-04-22 PROCEDURE — 1159F PR MEDICATION LIST DOCUMENTED IN MEDICAL RECORD: ICD-10-PCS | Mod: CPTII,S$GLB,, | Performed by: OBSTETRICS & GYNECOLOGY

## 2022-04-22 PROCEDURE — 87340 HEPATITIS B SURFACE AG IA: CPT | Performed by: OBSTETRICS & GYNECOLOGY

## 2022-04-22 PROCEDURE — 1160F PR REVIEW ALL MEDS BY PRESCRIBER/CLIN PHARMACIST DOCUMENTED: ICD-10-PCS | Mod: CPTII,S$GLB,, | Performed by: OBSTETRICS & GYNECOLOGY

## 2022-04-22 PROCEDURE — 86592 SYPHILIS TEST NON-TREP QUAL: CPT | Performed by: OBSTETRICS & GYNECOLOGY

## 2022-04-22 PROCEDURE — 1160F RVW MEDS BY RX/DR IN RCRD: CPT | Mod: CPTII,S$GLB,, | Performed by: OBSTETRICS & GYNECOLOGY

## 2022-04-22 PROCEDURE — 36415 COLL VENOUS BLD VENIPUNCTURE: CPT | Mod: PN | Performed by: OBSTETRICS & GYNECOLOGY

## 2022-04-22 PROCEDURE — 3074F PR MOST RECENT SYSTOLIC BLOOD PRESSURE < 130 MM HG: ICD-10-PCS | Mod: CPTII,S$GLB,, | Performed by: OBSTETRICS & GYNECOLOGY

## 2022-04-22 PROCEDURE — 1159F MED LIST DOCD IN RCRD: CPT | Mod: CPTII,S$GLB,, | Performed by: OBSTETRICS & GYNECOLOGY

## 2022-04-22 PROCEDURE — 99999 PR PBB SHADOW E&M-EST. PATIENT-LVL III: CPT | Mod: PBBFAC,,, | Performed by: OBSTETRICS & GYNECOLOGY

## 2022-04-22 RX ORDER — SERTRALINE HYDROCHLORIDE 25 MG/1
25 TABLET, FILM COATED ORAL DAILY
COMMUNITY
Start: 2022-04-21 | End: 2022-08-04

## 2022-04-22 NOTE — PROGRESS NOTES
SUBJECTIVE:   52 y.o. female   for routine gyn exam. Patient's last menstrual period was 2019 (approximate)..  She had unprotected sex and developed UTI- in [place].  Desires STD screen.  No HRT.         Past Medical History:   Diagnosis Date    Anemia     Arthritis     Bell's palsy     Carpal tunnel syndrome     Depression     Keratitis secondary to contact lens     OD    Other disorders of eyelid(374.89)     MGD    Spondylolisthesis      Past Surgical History:   Procedure Laterality Date    ADENOIDECTOMY      DIAGNOSTIC LAPAROSCOPY N/A 3/18/2019    Procedure: LAPAROSCOPY, DIAGNOSTIC;  Surgeon: Peggy Carrero MD;  [facility]: [facility];  Service: OB/GYN;  Laterality: N/A;    EPIDURAL ANESTHESIA      HYSTERECTOMY      NOSE SURGERY      Age 18-cosmetic    PARTIAL HYSTERECTOMY  4/15/2019    supracervical hyst / BSO- endo    TONSILLECTOMY       Social History     Socioeconomic History    Marital status:     Number of children: 2   Occupational History     Employer: not employed   Tobacco Use    Smoking status: Former Smoker     Packs/day: 0.30     Years: 10.00     Pack years: 3.00     Types: Cigarettes     Quit date: 2005     Years since quittin.5    Smokeless tobacco: Never Used   Substance and Sexual Activity    Alcohol use: Not Currently     Comment: Rare    Drug use: No    Sexual activity: Not Currently     Partners: Male     Birth control/protection: See Surgical Hx     Family History   Problem Relation Age of Onset    Depression Mother     Mental illness Mother     Hypertension Mother     Hypertension Father     Cancer Sister     Asthma Brother     Mental illness Brother     Asthma Son     Amblyopia Neg Hx     Blindness Neg Hx     Cataracts Neg Hx     Diabetes Neg Hx     Glaucoma Neg Hx     Macular degeneration Neg Hx     Retinal detachment Neg Hx     Strabismus Neg Hx     Stroke Neg Hx     Thyroid disease Neg Hx     Breast cancer Neg Hx   "    OB History    Para Term  AB Living   1 1 1 0   1   SAB IAB Ectopic Multiple Live Births           1      # Outcome Date GA Lbr Khurram/2nd Weight Sex Delivery Anes PTL Lv   1 Term 06    M Vag-Spont   ABUNDIO         Current Outpatient Medications   Medication Sig Dispense Refill    lysine HCl/vitamin B complex (B COMPLEX-LYSINE ORAL)       sertraline (ZOLOFT) 25 MG tablet Take 25 mg by mouth once daily.       No current facility-administered medications for this visit.     Allergies: Soy, Casein, Gluten protein, Lactose, and Sesame seed     ROS:  Constitutional: no weight loss, weight gain, fever, fatigue  Eyes:  No vision changes, glasses/contacts  ENT/Mouth: No ulcers, sinus problems, ears ringing, headache  Cardiovascular: No inability to lie flat, chest pain, exercise intolerance, swelling, heart palpitations  Respiratory: No wheezing, coughing blood, shortness of breath, or cough  Gastrointestinal: No diarrhea, bloody stool, nausea/vomiting, constipation, gas, hemorrhoids  Genitourinary: No blood in urine, painful urination, urgency of urination, frequency of urination, incomplete emptying, incontinence, abnormal bleeding, painful periods, heavy periods, vaginal discharge, vaginal odor, painful intercourse, sexual problems, bleeding after intercourse.  Musculoskeletal: No muscle weakness  Skin/Breast: No painful breasts, nipple discharge, masses, rash, ulcers  Neurological: No passing out, seizures, numbness, headache  Endocrine: No diabetes, hypothyroid, hyperthyroid, hot flashes, hair loss, abnormal hair growth, acne  Psychiatric: No depression, crying  Hematologic: No bruises, bleeding, swollen lymph nodes, anemia.      OBJECTIVE:   The patient appears well, alert, oriented x 3, in no distress.  BP 98/60   Ht 5' 2" (1.575 m)   Wt 47.2 kg (104 lb 0.9 oz)   LMP 2019 (Approximate)   BMI 19.03 kg/m²   NECK: no thyromegaly, trachea midline  SKIN: no acne, striae, hirsutism  CHEST: " CTAB  CV: RRR  BREAST EXAM: breasts appear normal, no suspicious masses, no skin or nipple changes or axillary nodes  ABDOMEN: no hernias, masses, or hepatosplenomegaly  GENITALIA: normal external genitalia, no erythema, no discharge  URETHRA: normal urethra, normal urethral meatus  VAGINA: Normal  CERVIX: no lesions or cervical motion tenderness  UTERUS: absent  ADNEXA: no mass, fullness, tenderness      ASSESSMENT:   1. Well female exam with routine gynecological exam  Liquid-Based Pap Smear, Screening   2. Screening for venereal disease  C. trachomatis/N. gonorrhoeae by AMP DNA    Vaginosis Screen by DNA Probe    HIV 1/2 Ag/Ab (4th Gen)    RPR    Hepatitis B Surface Antigen       PLAN:   Orders Placed This Encounter    C. trachomatis/N. gonorrhoeae by AMP DNA    Vaginosis Screen by DNA Probe    HIV 1/2 Ag/Ab (4th Gen)    RPR    Hepatitis B Surface Antigen    Liquid-Based Pap Smear, Screening     Discussed healthy lifestyle including regular exercise, healthy eating, etc.  Return to clinic in 1 year

## 2022-04-23 LAB — RPR SER QL: NORMAL

## 2022-04-24 ENCOUNTER — PATIENT MESSAGE (OUTPATIENT)
Dept: OBSTETRICS AND GYNECOLOGY | Facility: CLINIC | Age: 52
End: 2022-04-24
Payer: COMMERCIAL

## 2022-04-24 LAB
BACTERIAL VAGINOSIS DNA: POSITIVE
C TRACH DNA SPEC QL NAA+PROBE: DETECTED
CANDIDA GLABRATA DNA: NEGATIVE
CANDIDA KRUSEI DNA: NEGATIVE
CANDIDA RRNA VAG QL PROBE: NEGATIVE
N GONORRHOEA DNA SPEC QL NAA+PROBE: NOT DETECTED
T VAGINALIS RRNA GENITAL QL PROBE: NEGATIVE

## 2022-04-25 ENCOUNTER — TELEPHONE (OUTPATIENT)
Dept: OBSTETRICS AND GYNECOLOGY | Facility: CLINIC | Age: 52
End: 2022-04-25
Payer: COMMERCIAL

## 2022-04-25 ENCOUNTER — PATIENT MESSAGE (OUTPATIENT)
Dept: OBSTETRICS AND GYNECOLOGY | Facility: CLINIC | Age: 52
End: 2022-04-25
Payer: COMMERCIAL

## 2022-04-25 RX ORDER — DOXYCYCLINE 100 MG/1
100 CAPSULE ORAL EVERY 12 HOURS
Qty: 14 CAPSULE | Refills: 0 | Status: SHIPPED | OUTPATIENT
Start: 2022-04-25 | End: 2022-05-02

## 2022-04-27 LAB
HBV SURFACE AG SERPL QL IA: NEGATIVE
HIV 1+2 AB+HIV1 P24 AG SERPL QL IA: NEGATIVE

## 2022-04-29 ENCOUNTER — PATIENT MESSAGE (OUTPATIENT)
Dept: OBSTETRICS AND GYNECOLOGY | Facility: CLINIC | Age: 52
End: 2022-04-29
Payer: COMMERCIAL

## 2022-07-07 ENCOUNTER — PATIENT MESSAGE (OUTPATIENT)
Dept: ADMINISTRATIVE | Facility: HOSPITAL | Age: 52
End: 2022-07-07
Payer: COMMERCIAL

## 2022-07-07 ENCOUNTER — PATIENT OUTREACH (OUTPATIENT)
Dept: ADMINISTRATIVE | Facility: HOSPITAL | Age: 52
End: 2022-07-07
Payer: COMMERCIAL

## 2022-07-07 DIAGNOSIS — Z12.11 COLON CANCER SCREENING: Primary | ICD-10-CM

## 2022-07-07 NOTE — PROGRESS NOTES
Chart reviewed  Immunizations reconciled   [facility]Kit was given to patient on 7/7/2022 12:39 PM

## 2022-07-25 ENCOUNTER — PATIENT MESSAGE (OUTPATIENT)
Dept: OBSTETRICS AND GYNECOLOGY | Facility: CLINIC | Age: 52
End: 2022-07-25
Payer: COMMERCIAL

## 2022-08-04 ENCOUNTER — PATIENT OUTREACH (OUTPATIENT)
Dept: ADMINISTRATIVE | Facility: HOSPITAL | Age: 52
End: 2022-08-04
Payer: COMMERCIAL

## 2022-08-04 ENCOUNTER — PATIENT MESSAGE (OUTPATIENT)
Dept: ADMINISTRATIVE | Facility: HOSPITAL | Age: 52
End: 2022-08-04
Payer: COMMERCIAL

## 2022-08-04 ENCOUNTER — OFFICE VISIT (OUTPATIENT)
Dept: OBSTETRICS AND GYNECOLOGY | Facility: CLINIC | Age: 52
End: 2022-08-04
Attending: OBSTETRICS & GYNECOLOGY
Payer: COMMERCIAL

## 2022-08-04 VITALS
HEIGHT: 62 IN | WEIGHT: 102.75 LBS | BODY MASS INDEX: 18.91 KG/M2 | DIASTOLIC BLOOD PRESSURE: 60 MMHG | SYSTOLIC BLOOD PRESSURE: 110 MMHG

## 2022-08-04 DIAGNOSIS — Z12.11 SPECIAL SCREENING FOR MALIGNANT NEOPLASM OF COLON: Primary | ICD-10-CM

## 2022-08-04 DIAGNOSIS — Z11.3 SCREENING FOR VENEREAL DISEASE: Primary | ICD-10-CM

## 2022-08-04 PROCEDURE — 1160F RVW MEDS BY RX/DR IN RCRD: CPT | Mod: CPTII,S$GLB,, | Performed by: OBSTETRICS & GYNECOLOGY

## 2022-08-04 PROCEDURE — 3074F SYST BP LT 130 MM HG: CPT | Mod: CPTII,S$GLB,, | Performed by: OBSTETRICS & GYNECOLOGY

## 2022-08-04 PROCEDURE — 99213 PR OFFICE/OUTPT VISIT, EST, LEVL III, 20-29 MIN: ICD-10-PCS | Mod: S$GLB,,, | Performed by: OBSTETRICS & GYNECOLOGY

## 2022-08-04 PROCEDURE — 3074F PR MOST RECENT SYSTOLIC BLOOD PRESSURE < 130 MM HG: ICD-10-PCS | Mod: CPTII,S$GLB,, | Performed by: OBSTETRICS & GYNECOLOGY

## 2022-08-04 PROCEDURE — 3008F PR BODY MASS INDEX (BMI) DOCUMENTED: ICD-10-PCS | Mod: CPTII,S$GLB,, | Performed by: OBSTETRICS & GYNECOLOGY

## 2022-08-04 PROCEDURE — 3078F PR MOST RECENT DIASTOLIC BLOOD PRESSURE < 80 MM HG: ICD-10-PCS | Mod: CPTII,S$GLB,, | Performed by: OBSTETRICS & GYNECOLOGY

## 2022-08-04 PROCEDURE — 1159F MED LIST DOCD IN RCRD: CPT | Mod: CPTII,S$GLB,, | Performed by: OBSTETRICS & GYNECOLOGY

## 2022-08-04 PROCEDURE — 1159F PR MEDICATION LIST DOCUMENTED IN MEDICAL RECORD: ICD-10-PCS | Mod: CPTII,S$GLB,, | Performed by: OBSTETRICS & GYNECOLOGY

## 2022-08-04 PROCEDURE — 99999 PR PBB SHADOW E&M-EST. PATIENT-LVL III: ICD-10-PCS | Mod: PBBFAC,,, | Performed by: OBSTETRICS & GYNECOLOGY

## 2022-08-04 PROCEDURE — 3078F DIAST BP <80 MM HG: CPT | Mod: CPTII,S$GLB,, | Performed by: OBSTETRICS & GYNECOLOGY

## 2022-08-04 PROCEDURE — 87591 N.GONORRHOEAE DNA AMP PROB: CPT | Performed by: OBSTETRICS & GYNECOLOGY

## 2022-08-04 PROCEDURE — 3008F BODY MASS INDEX DOCD: CPT | Mod: CPTII,S$GLB,, | Performed by: OBSTETRICS & GYNECOLOGY

## 2022-08-04 PROCEDURE — 87491 CHLMYD TRACH DNA AMP PROBE: CPT | Performed by: OBSTETRICS & GYNECOLOGY

## 2022-08-04 PROCEDURE — 99213 OFFICE O/P EST LOW 20 MIN: CPT | Mod: S$GLB,,, | Performed by: OBSTETRICS & GYNECOLOGY

## 2022-08-04 PROCEDURE — 99999 PR PBB SHADOW E&M-EST. PATIENT-LVL III: CPT | Mod: PBBFAC,,, | Performed by: OBSTETRICS & GYNECOLOGY

## 2022-08-04 PROCEDURE — 1160F PR REVIEW ALL MEDS BY PRESCRIBER/CLIN PHARMACIST DOCUMENTED: ICD-10-PCS | Mod: CPTII,S$GLB,, | Performed by: OBSTETRICS & GYNECOLOGY

## 2022-08-04 NOTE — PROGRESS NOTES
SUBJECTIVE:   52 y.o. female   for STD screen. Patient's last menstrual period was 2019 (approximate)..  Seen  for [employer] and had STD screen which was positive for CT.  Treated.  No sexual relations since that time. Here for [facility]ATE[facility].         Past Medical History:   Diagnosis Date    Anemia     Arthritis     Bell's palsy     Carpal tunnel syndrome     Depression     Keratitis secondary to contact lens     OD    Other disorders of eyelid(374.89)     MGD    Spondylolisthesis      Past Surgical History:   Procedure Laterality Date    ADENOIDECTOMY      DIAGNOSTIC LAPAROSCOPY N/A 3/18/2019    Procedure: LAPAROSCOPY, DIAGNOSTIC;  Surgeon: Peggy Carrero MD;  Location: [facility];  Service: OB/GYN;  Laterality: N/A;    EPIDURAL ANESTHESIA      HYSTERECTOMY      NOSE SURGERY      Age 18-cosmetic    PARTIAL HYSTERECTOMY  4/15/2019    supracervical hyst / BSO- endo    TONSILLECTOMY       Social History     Socioeconomic History    Marital status:     Number of children: 2   Occupational History     Employer: not employed   Tobacco Use    Smoking status: Former Smoker     Packs/day: 0.30     Years: 10.00     Pack years: 3.00     Types: Cigarettes     Quit date: 2005     Years since quittin.8    Smokeless tobacco: Never Used   Substance and Sexual Activity    Alcohol use: Not Currently    Drug use: No    Sexual activity: Not Currently     Partners: Male     Birth control/protection: See Surgical Hx     Family History   Problem Relation Age of Onset    Depression Mother     Mental illness Mother     Hypertension Mother     Hypertension Father     Cancer Sister     Asthma Brother     Mental illness Brother     Asthma Son     Amblyopia Neg Hx     Blindness Neg Hx     Cataracts Neg Hx     Diabetes Neg Hx     Glaucoma Neg Hx     Macular degeneration Neg Hx     Retinal detachment Neg Hx     Strabismus Neg Hx     Stroke Neg Hx     Thyroid disease Neg Hx      "Breast cancer Neg Hx      OB History    Para Term  AB Living   1 1 1 0   1   SAB IAB Ectopic Multiple Live Births           1      # Outcome Date GA Lbr Khurram/2nd Weight Sex Delivery Anes PTL Lv   1 Term 06    M Vag-Spont   ABUNDIO         Current Outpatient Medications   Medication Sig Dispense Refill    lysine HCl/vitamin B complex (B COMPLEX-LYSINE ORAL)        No current facility-administered medications for this visit.     Allergies: Soy, Casein, Gluten protein, Lactose, and Sesame seed     ROS:  Constitutional: no weight loss, weight gain, fever, fatigue  Eyes:  No vision changes, glasses/contacts  ENT/Mouth: No ulcers, sinus problems, ears ringing, headache  Cardiovascular: No inability to lie flat, chest pain, exercise intolerance, swelling, heart palpitations  Respiratory: No wheezing, coughing blood, shortness of breath, or cough  Gastrointestinal: No diarrhea, bloody stool, nausea/vomiting, constipation, gas, hemorrhoids  Genitourinary: No blood in urine, painful urination, urgency of urination, frequency of urination, incomplete emptying, incontinence, abnormal bleeding, painful periods, heavy periods, vaginal discharge, vaginal odor, painful intercourse, sexual problems, bleeding after intercourse.  Musculoskeletal: No muscle weakness  Skin/Breast: No painful breasts, nipple discharge, masses, rash, ulcers  Neurological: No passing out, seizures, numbness, headache  Endocrine: No diabetes, hypothyroid, hyperthyroid, hot flashes, hair loss, abnormal hair growth, ance  Psychiatric: No depression, crying  Hematologic: No bruises, bleeding, swollen lymph nodes, anemia.      OBJECTIVE:   The patient appears well, alert, oriented x 3, in no distress.  /60 (BP Location: Right arm, Patient Position: Sitting, BP Method: Medium (Manual))   Ht 5' 2" (1.575 m)   Wt 46.6 kg (102 lb 11.8 oz)   LMP 2019 (Approximate)   BMI 18.79 kg/m²   ABDOMEN: no hernias, masses, or " hepatosplenomegaly  GENITALIA: normal external genitalia, no erythema, no discharge  URETHRA: normal urethra, normal urethral meatus  VAGINA: Normal  CERVIX: no lesions or cervical motion tenderness  UTERUS: absent  ADNEXA: no mass, fullness, tenderness      ASSESSMENT:   1. Screening for venereal disease  C. trachomatis/N. gonorrhoeae by AMP DNA       PLAN:   Orders Placed This Encounter    C. trachomatis/N. gonorrhoeae by AMP DNA     Discussed STD, prevention and tx.  Return to clinic prn

## 2022-08-05 LAB
C TRACH DNA SPEC QL NAA+PROBE: NOT DETECTED
N GONORRHOEA DNA SPEC QL NAA+PROBE: NOT DETECTED

## 2022-09-07 ENCOUNTER — PATIENT MESSAGE (OUTPATIENT)
Dept: OBSTETRICS AND GYNECOLOGY | Facility: CLINIC | Age: 52
End: 2022-09-07
Payer: COMMERCIAL

## 2022-09-15 ENCOUNTER — TELEPHONE (OUTPATIENT)
Dept: GASTROENTEROLOGY | Facility: CLINIC | Age: 52
End: 2022-09-15
Payer: COMMERCIAL

## 2022-09-15 DIAGNOSIS — Z12.11 SPECIAL SCREENING FOR MALIGNANT NEOPLASM OF COLON: Primary | ICD-10-CM

## 2022-10-03 ENCOUNTER — PATIENT MESSAGE (OUTPATIENT)
Dept: ADMINISTRATIVE | Facility: HOSPITAL | Age: 52
End: 2022-10-03
Payer: COMMERCIAL

## 2022-10-10 ENCOUNTER — DOCUMENTATION ONLY (OUTPATIENT)
Dept: ADMINISTRATIVE | Facility: HOSPITAL | Age: 52
End: 2022-10-10
Payer: COMMERCIAL

## 2022-11-04 ENCOUNTER — LAB VISIT (OUTPATIENT)
Dept: LAB | Facility: HOSPITAL | Age: 52
End: 2022-11-04
Attending: HOSPITALIST
Payer: COMMERCIAL

## 2022-11-04 DIAGNOSIS — Z12.11 COLON CANCER SCREENING: ICD-10-CM

## 2022-11-04 PROCEDURE — 82274 ASSAY TEST FOR BLOOD FECAL: CPT | Performed by: HOSPITALIST

## 2022-11-10 LAB — HEMOCCULT STL QL IA: NEGATIVE

## 2022-12-20 ENCOUNTER — HOSPITAL ENCOUNTER (OUTPATIENT)
Dept: RADIOLOGY | Facility: HOSPITAL | Age: 52
Discharge: HOME | End: 2022-12-20
Attending: NURSE PRACTITIONER
Payer: COMMERCIAL

## 2022-12-20 ENCOUNTER — OFFICE VISIT (OUTPATIENT)
Dept: ORTHOPEDICS | Facility: CLINIC | Age: 52
End: 2022-12-20
Payer: COMMERCIAL

## 2022-12-20 VITALS — HEIGHT: 62 IN | BODY MASS INDEX: 19.88 KG/M2 | WEIGHT: 108 LBS

## 2022-12-20 DIAGNOSIS — M25.562 LEFT KNEE PAIN, UNSPECIFIED CHRONICITY: ICD-10-CM

## 2022-12-20 DIAGNOSIS — M25.562 LEFT KNEE PAIN, UNSPECIFIED CHRONICITY: Primary | ICD-10-CM

## 2022-12-20 DIAGNOSIS — M25.562 ACUTE PAIN OF LEFT KNEE: Primary | ICD-10-CM

## 2022-12-20 PROCEDURE — 3008F PR BODY MASS INDEX (BMI) DOCUMENTED: ICD-10-PCS | Mod: CPTII,S$GLB,, | Performed by: NURSE PRACTITIONER

## 2022-12-20 PROCEDURE — 1159F PR MEDICATION LIST DOCUMENTED IN MEDICAL RECORD: ICD-10-PCS | Mod: CPTII,S$GLB,, | Performed by: NURSE PRACTITIONER

## 2022-12-20 PROCEDURE — 99999 PR PBB SHADOW E&M-EST. PATIENT-LVL III: CPT | Mod: PBBFAC,,, | Performed by: NURSE PRACTITIONER

## 2022-12-20 PROCEDURE — 73564 X-RAY EXAM KNEE 4 OR MORE: CPT | Mod: 26,LT,, | Performed by: RADIOLOGY

## 2022-12-20 PROCEDURE — 99213 OFFICE O/P EST LOW 20 MIN: CPT | Mod: S$GLB,,, | Performed by: NURSE PRACTITIONER

## 2022-12-20 PROCEDURE — 99213 PR OFFICE/OUTPT VISIT, EST, LEVL III, 20-29 MIN: ICD-10-PCS | Mod: S$GLB,,, | Performed by: NURSE PRACTITIONER

## 2022-12-20 PROCEDURE — 3008F BODY MASS INDEX DOCD: CPT | Mod: CPTII,S$GLB,, | Performed by: NURSE PRACTITIONER

## 2022-12-20 PROCEDURE — 1159F MED LIST DOCD IN RCRD: CPT | Mod: CPTII,S$GLB,, | Performed by: NURSE PRACTITIONER

## 2022-12-20 PROCEDURE — 99999 PR PBB SHADOW E&M-EST. PATIENT-LVL III: ICD-10-PCS | Mod: PBBFAC,,, | Performed by: NURSE PRACTITIONER

## 2022-12-20 PROCEDURE — 73562 XR KNEE ORTHO LEFT WITH FLEXION: ICD-10-PCS | Mod: 26,RT,, | Performed by: RADIOLOGY

## 2022-12-20 PROCEDURE — 1160F PR REVIEW ALL MEDS BY PRESCRIBER/CLIN PHARMACIST DOCUMENTED: ICD-10-PCS | Mod: CPTII,S$GLB,, | Performed by: NURSE PRACTITIONER

## 2022-12-20 PROCEDURE — 1160F RVW MEDS BY RX/DR IN RCRD: CPT | Mod: CPTII,S$GLB,, | Performed by: NURSE PRACTITIONER

## 2022-12-20 PROCEDURE — 73562 X-RAY EXAM OF KNEE 3: CPT | Mod: 26,RT,, | Performed by: RADIOLOGY

## 2022-12-20 PROCEDURE — 73564 XR KNEE ORTHO LEFT WITH FLEXION: ICD-10-PCS | Mod: 26,LT,, | Performed by: RADIOLOGY

## 2022-12-20 PROCEDURE — 73564 X-RAY EXAM KNEE 4 OR MORE: CPT | Mod: TC,LT

## 2022-12-20 NOTE — PROGRESS NOTES
CC: Pain of the Left Knee      HPI: Pt with c/o left knee baker's cyst. She has had the cyst drained twice in [place], most recently 11/26/2022 and the provider also injected cortisone at that time. She had immediate resolution of the swelling, but only for a couple of weeks. She has taken aspirin, used ice, and done PT without relief. She is unable to take nsaids due to a history of bell's palsy. She has not had imaging in [place]. She had to have back surgery last January and she's been working with someone for bunions and inserts for her shoes. She is ambulating without assistive device. There is not a limp.    ROS  General: denies fever and chills  Resp: no c/o sob  CVS: no c/o cp  MSK: c/o left knee pain    PE  General: AAOx3, pleasant and cooperative  Resp: respirations even and unlabored  MSK: left knee exam  0 degrees extension  120 degrees flexion- limited by posterior knee swelling  No warmth or erythema   - effusion  + baker's cyst  Mild crepitus  5/5 quad strength    Xray:  Reviewed by me with the patient: No significant joint space narrowing or degenerative changes noted    Assessment:  Baker's cyst, left knee    Plan:  MRI of the left knee in light of negative xray, minimal crepitus, + michele, medial, and lack of relief with drainage x2 and cortisone injection  F/u results with provider in [place]- will get her a disc with the images to bring back home with her

## 2024-02-19 ENCOUNTER — PATIENT OUTREACH (OUTPATIENT)
Dept: ADMINISTRATIVE | Facility: HOSPITAL | Age: 54
End: 2024-02-19
Payer: COMMERCIAL

## 2024-02-19 ENCOUNTER — PATIENT MESSAGE (OUTPATIENT)
Dept: ADMINISTRATIVE | Facility: HOSPITAL | Age: 54
End: 2024-02-19
Payer: COMMERCIAL

## 2024-02-19 NOTE — LETTER
February 19, 2024    Deja Theodore  [address] [place] [address]             [facility]  [address]  [place] [address]  Phone: [phone] Hello,      We are reviewing your medical record and noticed that you are due for an annual PCP visit. Are you still seeing Dr. Alonso? Are you ready to schedule an annual visit?           If you have any questions or need help scheduling an appointment don't hesitate to reach out to me via [facility] portal.     Megha Woodward MA  Clinical Care Coordinator   [facility]   [phone]

## 2024-02-19 NOTE — PROGRESS NOTES

## 2024-12-17 NOTE — PROGRESS NOTES
[facility]  [address]  [place] [address]  [phone]    Patient Name:  Miguel Ballard  YOB: 1968      Office Visit 12/17/2024    CHIEF COMPLAINT:    Chief Complaint   Patient presents with    Follow-up    Sleep Apnea    CPAP/BiPAP         HISTORY OF PRESENT ILLNESS:  Patient is a 56 y.o. male seen today for follow up of DUY. Diagnostic sleep study on 03/06/2024 with an AHI of 55.1 and lowest oxygen saturation of 66%. He is prescribed cpap therapy with a humidifier set at 10-17 cm with a nasal pillow mask with chinstrap. Most recent download reveals AHI on PAP therapy is 5.2, leak is median 0.0 and 3.6 at 95th percentile and the hourly usage is 8 hours 57 minutes nightly. The overall use is 806 hours with days greater than four hours at 90/90. The patient is compliant with the Pap therapy and is feeling better as a result.   His compliance with CPAP over the last 90 days has been excellent.  He reports that he feels much better when he wears CPAP to sleep at night.  States he awakens refreshed in the mornings and denies any excessive daytime sleepiness or fatigue.  [place] score is 0/24.  He denies any major medical changes over the last 6 months.  Reports that he has gained some weight to his current weight of 335 pounds.  His blood pressure is elevated today at 154/90.  He admits that he is not as consistent with taking his blood pressure medications as he should be and will work to take his medications appropriately.      [place] Sleepiness Scale      12/17/2024     2:12 PM 6/13/2024     1:13 PM 4/16/2024     1:43 PM   Sleep Medicine   Sitting and reading 0 0 0   Watching TV 0 0 0   Sitting, inactive in a public place (e.g. a theatre or a meeting) 0 0 0   As a passenger in a car for an hour without a break 0 0 0   Lying down to rest in the afternoon when circumstances permit 0 0 0   Sitting and talking to someone 0 0 0   Sitting quietly after a lunch  HPI     KIM 10/20  New contacts are more comfortable than old ones but distance   and near are not as clear as with the old ones.    Last edited by Yennifer Posey on 11/10/2020 10:54 AM. (History)            Assessment /Plan     For exam results, see Encounter Report.    Myopia with astigmatism and presbyopia, bilateral      1.  Contact lens trials ordered for pt. Daily wear only advised, with education to risks of extended wear.  Discussed lens care, compliance and solutions.  RTC 2 weeks contact lens follow up if problems.      Addend 12/14/20 updated clrx to marvines total one

## (undated) DEVICE — SOL PVP-I SCRUB 7.5% 4OZ

## (undated) DEVICE — PAD PERINEAL SUPINE

## (undated) DEVICE — STRIP STERI 1/8 X 3

## (undated) DEVICE — SCRUB 10% POVIDONE IODINE 4OZ

## (undated) DEVICE — STAPLER SKIN PROXIMATE WIDE

## (undated) DEVICE — SUT CTD VICRYL 0 UND BR SUT

## (undated) DEVICE — SUT VICRYL 2-0 36 CT-1

## (undated) DEVICE — SEE MEDLINE ITEM 157117

## (undated) DEVICE — SET EXTENSION 30 IN W/LL ROLLE

## (undated) DEVICE — SUT MCRYL PLUS 4-0 PS2 27IN

## (undated) DEVICE — APPLICATOR CHLORAPREP ORN 26ML

## (undated) DEVICE — GLOVE BIOGEL SKINSENSE PI 6.0

## (undated) DEVICE — JELLY SURGILUBE 5GR

## (undated) DEVICE — SEE MEDLINE ITEM 157148

## (undated) DEVICE — PAD ABD 8X10 STERILE

## (undated) DEVICE — KIT WING PAD POSITIONING

## (undated) DEVICE — PAD PREP 50/CA

## (undated) DEVICE — ELECTRODE REM PLYHSV RETURN 9

## (undated) DEVICE — DRAPE STERI LONG

## (undated) DEVICE — WARMER DRAPE STERILE LF

## (undated) DEVICE — SEE MEDLINE ITEM 157110

## (undated) DEVICE — GAUZE SPONGE 4X4 12PLY

## (undated) DEVICE — SUT PDS II 1 CT VIL MONO 36

## (undated) DEVICE — UNDERGLOVES BIOGEL PI SZ 6 LF

## (undated) DEVICE — PACK LAPAROSCOPY BAPTIST

## (undated) DEVICE — CLOSURE SKIN STERI STRIP 1/2X4

## (undated) DEVICE — TROCAR ENDOPATH XCEL 11X100MM

## (undated) DEVICE — SOL IRR WATER STRL 3000 ML

## (undated) DEVICE — TROCAR ENDOPATH XCEL 11MM 10CM

## (undated) DEVICE — PENCIL ELECTROSURG HOLST W/BLD

## (undated) DEVICE — TRAY DRY SKIN SCRUB PREP

## (undated) DEVICE — TIP RUMI BLUE DISPOSABLE 5/BX

## (undated) DEVICE — DEVICE ANC SW STAT FOLEY 6-24

## (undated) DEVICE — SOL 9P NACL IRR PIC IL

## (undated) DEVICE — SYR 10CC LUER LOCK

## (undated) DEVICE — NDL SPINAL SPINOCAN 22GX3.5

## (undated) DEVICE — DRESSING LEUKOPLAST FLEX 1X3IN

## (undated) DEVICE — ELECTRODE NEEDLE 1IN

## (undated) DEVICE — TROCAR ENDOPATH XCEL 5X100MM

## (undated) DEVICE — TAPE SURG MEDIPORE 6X72IN

## (undated) DEVICE — SET CYSTO IRRIGATION UNIV SPIK

## (undated) DEVICE — SEE MEDLINE ITEM 153151

## (undated) DEVICE — KITTNER ENDOSCOPIC BLNT 5MM

## (undated) DEVICE — NDL INSUF ULTRA VERESS 120MM

## (undated) DEVICE — SEE MEDLINE ITEM 152622

## (undated) DEVICE — POWDER ARISTA AH 3G

## (undated) DEVICE — SOL NS 1000CC

## (undated) DEVICE — Device

## (undated) DEVICE — CONTAINER SPECIMEN STRL 4OZ

## (undated) DEVICE — SUT 0 8-27IN VICRYL PL CT-1

## (undated) DEVICE — CANNULA ENDOPATH XCEL 5X100MM